# Patient Record
Sex: FEMALE | Race: WHITE | NOT HISPANIC OR LATINO | Employment: OTHER | URBAN - METROPOLITAN AREA
[De-identification: names, ages, dates, MRNs, and addresses within clinical notes are randomized per-mention and may not be internally consistent; named-entity substitution may affect disease eponyms.]

---

## 2017-01-05 ENCOUNTER — HOSPITAL ENCOUNTER (OUTPATIENT)
Dept: RADIOLOGY | Facility: MEDICAL CENTER | Age: 72
End: 2017-01-05
Attending: INTERNAL MEDICINE
Payer: MEDICARE

## 2017-01-05 ENCOUNTER — HOSPITAL ENCOUNTER (OUTPATIENT)
Dept: CARDIOLOGY | Facility: MEDICAL CENTER | Age: 72
End: 2017-01-05
Attending: INTERNAL MEDICINE | Admitting: INTERNAL MEDICINE
Payer: MEDICARE

## 2017-01-05 DIAGNOSIS — R00.2 PALPITATIONS: Chronic | ICD-10-CM

## 2017-01-05 DIAGNOSIS — R55 NEAR SYNCOPE: ICD-10-CM

## 2017-01-05 DIAGNOSIS — I25.10 CORONARY ARTERY DISEASE, NON-OCCLUSIVE: ICD-10-CM

## 2017-01-05 LAB
LV EJECT FRACT  99904: 70
LV EJECT FRACT MOD 2C 99903: 73.16
LV EJECT FRACT MOD 4C 99902: 66.24
LV EJECT FRACT MOD BP 99901: 61.92

## 2017-01-05 PROCEDURE — 93306 TTE W/DOPPLER COMPLETE: CPT

## 2017-01-05 PROCEDURE — A9502 TC99M TETROFOSMIN: HCPCS

## 2017-01-05 PROCEDURE — 93306 TTE W/DOPPLER COMPLETE: CPT | Mod: 26 | Performed by: INTERNAL MEDICINE

## 2017-01-09 ENCOUNTER — TELEPHONE (OUTPATIENT)
Dept: CARDIOLOGY | Facility: MEDICAL CENTER | Age: 72
End: 2017-01-09

## 2017-01-09 NOTE — TELEPHONE ENCOUNTER
----- Message from Grady Oneill M.D. sent at 1/9/2017  7:28 AM PST -----  Regarding: test results  Please tell the patient that both her echo and MPI look very good  Keep appt with EP

## 2017-02-15 ENCOUNTER — OFFICE VISIT (OUTPATIENT)
Dept: CARDIOLOGY | Facility: MEDICAL CENTER | Age: 72
End: 2017-02-15
Payer: MEDICARE

## 2017-02-15 VITALS
SYSTOLIC BLOOD PRESSURE: 132 MMHG | BODY MASS INDEX: 23.41 KG/M2 | HEIGHT: 61 IN | HEART RATE: 66 BPM | OXYGEN SATURATION: 96 % | WEIGHT: 124 LBS | DIASTOLIC BLOOD PRESSURE: 72 MMHG

## 2017-02-15 DIAGNOSIS — I47.10 PAROXYSMAL SUPRAVENTRICULAR TACHYCARDIA: Chronic | ICD-10-CM

## 2017-02-15 DIAGNOSIS — E78.00 HYPERCHOLESTEREMIA: Chronic | ICD-10-CM

## 2017-02-15 DIAGNOSIS — I25.10 CORONARY ARTERY DISEASE, NON-OCCLUSIVE: ICD-10-CM

## 2017-02-15 DIAGNOSIS — I47.19 PAT (PAROXYSMAL ATRIAL TACHYCARDIA): ICD-10-CM

## 2017-02-15 DIAGNOSIS — R00.2 PALPITATIONS: Chronic | ICD-10-CM

## 2017-02-15 PROCEDURE — 1101F PT FALLS ASSESS-DOCD LE1/YR: CPT | Mod: 8P | Performed by: INTERNAL MEDICINE

## 2017-02-15 PROCEDURE — G8420 CALC BMI NORM PARAMETERS: HCPCS | Performed by: INTERNAL MEDICINE

## 2017-02-15 PROCEDURE — G8432 DEP SCR NOT DOC, RNG: HCPCS | Performed by: INTERNAL MEDICINE

## 2017-02-15 PROCEDURE — 3014F SCREEN MAMMO DOC REV: CPT | Performed by: INTERNAL MEDICINE

## 2017-02-15 PROCEDURE — 3017F COLORECTAL CA SCREEN DOC REV: CPT | Mod: 8P | Performed by: INTERNAL MEDICINE

## 2017-02-15 PROCEDURE — G8484 FLU IMMUNIZE NO ADMIN: HCPCS | Performed by: INTERNAL MEDICINE

## 2017-02-15 PROCEDURE — G8598 ASA/ANTIPLAT THER USED: HCPCS | Performed by: INTERNAL MEDICINE

## 2017-02-15 PROCEDURE — 4040F PNEUMOC VAC/ADMIN/RCVD: CPT | Performed by: INTERNAL MEDICINE

## 2017-02-15 PROCEDURE — 1036F TOBACCO NON-USER: CPT | Performed by: INTERNAL MEDICINE

## 2017-02-15 PROCEDURE — 99214 OFFICE O/P EST MOD 30 MIN: CPT | Performed by: INTERNAL MEDICINE

## 2017-02-15 ASSESSMENT — ENCOUNTER SYMPTOMS
LOSS OF CONSCIOUSNESS: 0
MYALGIAS: 0
PND: 0
ORTHOPNEA: 0
DIZZINESS: 0

## 2017-02-15 NOTE — MR AVS SNAPSHOT
"        Shaniqua Solis   2/15/2017 4:00 PM   Office Visit   MRN: [id number]    Department:  [facility]   Dept Phone:  [phone]    Description:  Female : 1945   Provider:  Grady Oneill M.D.           Reason for Visit     Follow-Up           Allergies as of 2/15/2017     No Known Allergies      You were diagnosed with     Palpitations   [785.1.ICD-9-CM]       PAT (paroxysmal atrial tachycardia) (CMS-HCC)   [200004]       Paroxysmal supraventricular tachycardia (CMS-HCC)   [427.0.ICD-9-CM]       Coronary artery disease, non-occlusive   [919303]       Hypercholesteremia   [558081]         Vital Signs     Blood Pressure Pulse Height Weight Body Mass Index Oxygen Saturation    132/72 mmHg 66 1.549 m (5' 0.98\") 56.246 kg (124 lb) 23.44 kg/m2 96%    Smoking Status                   Never Smoker            Basic Information     Date Of Birth Sex Race Ethnicity Preferred Language    1945 Female White Non- English      Your appointments     Apr 10, 2017 12:40 PM   FOLLOW UP with Eugene Vela M.D.   [facility] (--)    [address], [facility] [address]  [facility] [address] [phone]              Problem List              ICD-10-CM Priority Class Noted - Resolved    CAD (coronary artery disease) (Chronic) I25.10 Medium  11/10/2010 - Present    Dizziness (Chronic) R42   2010 - Present    Hypercholesteremia (Chronic) E78.00 Medium  11/10/2010 - Present    Murmur (Chronic) R01.1 Medium  11/10/2010 - Present    Palpitations (Chronic) R00.2 Medium  11/10/2010 - Present    Paroxysmal supraventricular tachycardia (CMS-HCC) (Chronic) I47.1 Medium  11/10/2010 - Present    CHEST PAIN  High  2012 - Present    Cataract, right eye H26.9   2015 - Present    Coronary artery disease, non-occlusive I25.10   2016 - Present    Near syncope R55   2016 - Present    PAT (paroxysmal atrial tachycardia) (CMS-HCC) I47.1   2/15/2017 - Present      Health " Maintenance        Date Due Completion Dates    IMM DTaP/Tdap/Td Vaccine (1 - Tdap) 7/9/1964 ---    PAP SMEAR 7/9/1966 ---    COLONOSCOPY 7/9/1995 ---    IMM ZOSTER VACCINE 7/9/2005 ---    IMM INFLUENZA (1) 9/1/2016 ---    MAMMOGRAM 12/15/2016 12/15/2015, 12/10/2014, 11/25/2013, 11/25/2013, 11/25/2013, 10/13/2011, 8/20/2004    IMM PNEUMOCOCCAL 65+ (ADULT) LOW/MEDIUM RISK SERIES (2 of 2 - PPSV23) 9/6/2017 9/6/2016, 9/6/2016    BONE DENSITY 3/17/2021 3/17/2016            Current Immunizations     13-VALENT PCV PREVNAR 9/6/2016  1:19 PM    Pneumococcal polysaccharide vaccine (PPSV-23) 9/6/2016  1:17 PM      Below and/or attached are the medications your provider expects you to take. Review all of your home medications and newly ordered medications with your provider and/or pharmacist. Follow medication instructions as directed by your provider and/or pharmacist. Please keep your medication list with you and share with your provider. Update the information when medications are discontinued, doses are changed, or new medications (including over-the-counter products) are added; and carry medication information at all times in the event of emergency situations     Allergies:  No Known Allergies          Medications  Valid as of: February 15, 2017 -  4:37 PM    Generic Name Brand Name Tablet Size Instructions for use    Aspirin (Tab) aspirin 81 MG Take 81 mg by mouth every day.        Atorvastatin Calcium (Tab) LIPITOR 40 MG Take 1 Tab by mouth every day.        Calcium Carb-Cholecalciferol (Tab) oyster shell calcium/vitamin D 250-125 MG-UNIT Take 1 Tab by mouth every day.        Conj Estrog-Medroxyprogest Ace (Tab) PREMPRO 0.3-1.5 MG Take 1 Tab by mouth every day.        Metoprolol Succinate (TABLET SR 24 HR) TOPROL XL 25 MG Take 2 Tabs by mouth every day.        Simvastatin   Take  by mouth. UNKNOWN DOSE        .                 Medicines prescribed today were sent to:     [id number] [place] - [address]    [address]  [place] [address]    Phone: [phone] Fax: [phone]    Open 24 Hours?: No      Medication refill instructions:       If your prescription bottle indicates you have medication refills left, it is not necessary to call your provider’s office. Please contact your pharmacy and they will refill your medication.    If your prescription bottle indicates you do not have any refills left, you may request refills at any time through one of the following ways: The online Spacebar system (except Urgent Care), by calling your provider’s office, or by asking your pharmacy to contact your provider’s office with a refill request. Medication refills are processed only during regular business hours and may not be available until the next business day. Your provider may request additional information or to have a follow-up visit with you prior to refilling your medication.   *Please Note: Medication refills are assigned a new Rx number when refilled electronically. Your pharmacy may indicate that no refills were authorized even though a new prescription for the same medication is available at the pharmacy. Please request the medicine by name with the pharmacy before contacting your provider for a refill.        Referral     A referral request has been sent to our patient care coordination department. Please allow 3-5 business days for us to process this request and contact you either by phone or mail. If you do not hear from us by the 5th business day, please call us at [phone].           Spacebar Status: Patient Declined

## 2017-02-16 NOTE — PROGRESS NOTES
Subjective:   Shaniqua Solis is a 67 y.o. female who presents today CAD, palpitations nonsustained VT, PAT and PSVT.    Last seen 12/05/2016 by ERIC Brannon.    Was seen by Dr. Vela for symptomatic palpitations related to what he felt was PAT.  Metoprolol dose was doubled.  The patient also cut back on caffeine.  Nonetheless the patient continues to have daily sometimes frequent palpitations which are bothersome.  No syncope.  Noninvasive evaluation was normal.    Past medical history  Having also supple daily palpitations with some lightheadedness since September.  Holter monitor showed supraventricular tachycardia and one episode of nonsustained ventricular tachycardia.  No angina pectoris.  Has continued on metoprolol and simvastatin and aspirin.    Past Medical History   Diagnosis Date   • CAD (coronary artery disease) 11/10/2010   • Dizziness 2/2/2010   • Hypercholesteremia 11/10/2010   • Murmur 11/10/2010   • Palpitations 11/10/2010   • Paroxysmal supraventricular tachycardia (CMS-HCC) 11/10/2010   • Unspecified cataract    • Heart burn    • High cholesterol      Past Surgical History   Procedure Laterality Date   • Primary c section     • Pr anesth,repair lo abd hernia nos     • [facility] facelift:neck/midface     • Cataract phaco with iol Right 6/22/2015     Procedure: CATARACT PHACO WITH IOL;  Surgeon: David Llanos M.D.;  Location: SURGERY SUR[facility];  Service:      History   Smoking status   • Never Smoker    Smokeless tobacco   • Never Used     No Known Allergies  Outpatient Encounter Prescriptions as of 2/15/2017   Medication Sig Dispense Refill   • SIMVASTATIN PO Take  by mouth. UNKNOWN DOSE     • metoprolol SR (TOPROL XL) 25 MG TABLET SR 24 HR Take 2 Tabs by mouth every day. 180 Tab 2   • atorvastatin (LIPITOR) 40 MG Tab Take 1 Tab by mouth every day. 90 Tab 3   • oyster shell calcium/vitamin D 250-125 MG-UNIT TABS tablet Take 1 Tab by mouth every day.     • aspirin 81 MG tablet Take 81 mg  "by mouth every day.     • estrogen, conjugated,-medroxyprogesterone (PREMPRO) 0.3-1.5 MG per tablet Take 1 Tab by mouth every day.       No facility-administered encounter medications on file as of 2/15/2017.     Review of Systems   Cardiovascular: Negative for orthopnea, leg swelling and PND.   Musculoskeletal: Negative for myalgias.   Neurological: Negative for dizziness and loss of consciousness.        Objective:   /72 mmHg  Pulse 66  Ht 1.549 m (5' 0.98\")  Wt 56.246 kg (124 lb)  BMI 23.44 kg/m2  SpO2 96%    Physical Exam   Constitutional: She is oriented to person, place, and time. She appears well-nourished. No distress.   HENT:   Head: Normocephalic and atraumatic.   Mouth/Throat: Mucous membranes are normal.   Eyes: Conjunctivae and EOM are normal.   Neck: No JVD present. Carotid bruit is not present.   Normal jugular venous pressure.   Cardiovascular: Normal rate, regular rhythm, S1 normal and S2 normal.  Exam reveals no gallop and no friction rub.    Murmur heard.   Systolic murmur is present with a grade of 1/6   Pulses:       Carotid pulses are 2+ on the right side, and 2+ on the left side.       Radial pulses are 2+ on the right side, and 2+ on the left side.        Femoral pulses are 2+ on the right side, and 2+ on the left side.       Posterior tibial pulses are 2+ on the right side, and 2+ on the left side.   No femoral bruits.   Pulmonary/Chest: Effort normal and breath sounds normal. She has no wheezes. She has no rhonchi. She has no rales.   Abdominal: Soft. Bowel sounds are normal. She exhibits no abdominal bruit, no pulsatile midline mass and no mass. There is no hepatosplenomegaly. There is no tenderness.   Musculoskeletal: She exhibits no edema.   Neurological: She is alert and oriented to person, place, and time. She has normal strength. Gait normal.   Skin: Skin is warm and dry. No cyanosis. Nails show no clubbing.   Psychiatric: She has a normal mood and affect. Her behavior is " normal.       Assessment:     1. Palpitations     2. Paroxysmal supraventricular tachycardia (CMS-HCC)     3. Coronary artery disease, non-occlusive     4. Hypercholesteremia       2/17/2012 MPI  1)   Normal exercise Cardiolite stress test with no evidence of  ischemia or infarction.   2)  Normal left ventricular systolic function with a calculated  ejection fraction of 86%.   3)  No chest pain reported, and no ischemic EKG changes seen.   4)  Average exercise tolerance.   5)  No prior nuclear stress test available for comparison.      04/04/2012 Lab: Cholesterol 178. Triglycerides 146. HDL 49. .    11/29/2010 Echo: EF 84%. Mild LVH. 1-2+ AR. 1+ MR. PSP 22-27 mmHg.    01/23/2003 CARDIAC CATHETERIZATION  EF 67%.  50-70% diagonal stenosis.  Treated medically.    11/02/2016 HOLTER MONITOR  Normal sinus rhythm.  Multiple episodes of SVT.  Nonsustained VT.    Medical Decision Making:  Today's Assessment / Status / Plan:     Palpitations. Recurrent.    PAT. Recurrent despite increased metoprolol dose.    Nonsustained ventricular tachycardia.    CAD: Nonobstructive. Asymptomatic. Continue current therapy.    Hypercholesterolemia: On atorvastatin.    Recommendations  Continue current therapy.  Refer back to EP.    Follow-up 6 months.

## 2017-06-22 DIAGNOSIS — Z01.810 PRE-OPERATIVE CARDIOVASCULAR EXAMINATION: ICD-10-CM

## 2017-06-22 LAB — EKG IMPRESSION: NORMAL

## 2017-06-22 PROCEDURE — 93005 ELECTROCARDIOGRAM TRACING: CPT

## 2017-06-22 PROCEDURE — 93010 ELECTROCARDIOGRAM REPORT: CPT | Performed by: INTERNAL MEDICINE

## 2017-07-06 ENCOUNTER — HOSPITAL ENCOUNTER (OUTPATIENT)
Facility: MEDICAL CENTER | Age: 72
End: 2017-07-06
Attending: ORTHOPAEDIC SURGERY | Admitting: ORTHOPAEDIC SURGERY
Payer: MEDICARE

## 2017-07-06 VITALS
HEART RATE: 62 BPM | DIASTOLIC BLOOD PRESSURE: 59 MMHG | RESPIRATION RATE: 16 BRPM | TEMPERATURE: 96.8 F | BODY MASS INDEX: 24.41 KG/M2 | HEIGHT: 60 IN | OXYGEN SATURATION: 90 % | SYSTOLIC BLOOD PRESSURE: 114 MMHG | WEIGHT: 124.34 LBS

## 2017-07-06 PROBLEM — S83.232A COMPLEX TEAR OF MEDIAL MENISCUS OF LEFT KNEE AS CURRENT INJURY: Status: ACTIVE | Noted: 2017-07-06

## 2017-07-06 PROCEDURE — 501654 HCHG TUBING, ARTHREX PATIENT REDEUCE: Performed by: ORTHOPAEDIC SURGERY

## 2017-07-06 PROCEDURE — 160048 HCHG OR STATISTICAL LEVEL 1-5: Performed by: ORTHOPAEDIC SURGERY

## 2017-07-06 PROCEDURE — 160047 HCHG PACU  - EA ADDL 30 MINS PHASE II: Performed by: ORTHOPAEDIC SURGERY

## 2017-07-06 PROCEDURE — 700105 HCHG RX REV CODE 258: Performed by: ORTHOPAEDIC SURGERY

## 2017-07-06 PROCEDURE — 502580 HCHG PACK, KNEE ARTHROSCOPY: Performed by: ORTHOPAEDIC SURGERY

## 2017-07-06 PROCEDURE — 700101 HCHG RX REV CODE 250

## 2017-07-06 PROCEDURE — 160046 HCHG PACU - 1ST 60 MINS PHASE II: Performed by: ORTHOPAEDIC SURGERY

## 2017-07-06 PROCEDURE — 160035 HCHG PACU - 1ST 60 MINS PHASE I: Performed by: ORTHOPAEDIC SURGERY

## 2017-07-06 PROCEDURE — 501838 HCHG SUTURE GENERAL: Performed by: ORTHOPAEDIC SURGERY

## 2017-07-06 PROCEDURE — 700111 HCHG RX REV CODE 636 W/ 250 OVERRIDE (IP)

## 2017-07-06 PROCEDURE — 160029 HCHG SURGERY MINUTES - 1ST 30 MINS LEVEL 4: Performed by: ORTHOPAEDIC SURGERY

## 2017-07-06 PROCEDURE — 160002 HCHG RECOVERY MINUTES (STAT): Performed by: ORTHOPAEDIC SURGERY

## 2017-07-06 PROCEDURE — 160041 HCHG SURGERY MINUTES - EA ADDL 1 MIN LEVEL 4: Performed by: ORTHOPAEDIC SURGERY

## 2017-07-06 PROCEDURE — 160009 HCHG ANES TIME/MIN: Performed by: ORTHOPAEDIC SURGERY

## 2017-07-06 PROCEDURE — 501336 HCHG SHAVER: Performed by: ORTHOPAEDIC SURGERY

## 2017-07-06 PROCEDURE — 160036 HCHG PACU - EA ADDL 30 MINS PHASE I: Performed by: ORTHOPAEDIC SURGERY

## 2017-07-06 PROCEDURE — 160025 RECOVERY II MINUTES (STATS): Performed by: ORTHOPAEDIC SURGERY

## 2017-07-06 PROCEDURE — 501655 HCHG TUBING, ARTHREX PUMP REDEUCE: Performed by: ORTHOPAEDIC SURGERY

## 2017-07-06 RX ORDER — HYDROCODONE BITARTRATE AND ACETAMINOPHEN 5; 325 MG/1; MG/1
1-2 TABLET ORAL EVERY 4 HOURS PRN
Qty: 40 TAB | Refills: 0 | Status: SHIPPED | OUTPATIENT
Start: 2017-07-06 | End: 2017-11-28

## 2017-07-06 RX ORDER — BUPIVACAINE HYDROCHLORIDE AND EPINEPHRINE 5; 5 MG/ML; UG/ML
INJECTION, SOLUTION EPIDURAL; INTRACAUDAL; PERINEURAL
Status: DISCONTINUED | OUTPATIENT
Start: 2017-07-06 | End: 2017-07-06 | Stop reason: HOSPADM

## 2017-07-06 RX ORDER — SODIUM CHLORIDE, SODIUM LACTATE, POTASSIUM CHLORIDE, CALCIUM CHLORIDE 600; 310; 30; 20 MG/100ML; MG/100ML; MG/100ML; MG/100ML
INJECTION, SOLUTION INTRAVENOUS
Status: DISCONTINUED | OUTPATIENT
Start: 2017-07-06 | End: 2017-07-06 | Stop reason: HOSPADM

## 2017-07-06 RX ADMIN — EPHEDRINE SULFATE 10 MG: 50 INJECTION INTRAMUSCULAR; INTRAVENOUS; SUBCUTANEOUS at 12:49

## 2017-07-06 RX ADMIN — SODIUM CHLORIDE, POTASSIUM CHLORIDE, SODIUM LACTATE AND CALCIUM CHLORIDE 1000 ML: 600; 310; 30; 20 INJECTION, SOLUTION INTRAVENOUS at 11:20

## 2017-07-06 ASSESSMENT — PAIN SCALES - GENERAL
PAINLEVEL_OUTOF10: 0
PAINLEVEL_OUTOF10: ASSUMED PAIN PRESENT
PAINLEVEL_OUTOF10: ASSUMED PAIN PRESENT
PAINLEVEL_OUTOF10: 7
PAINLEVEL_OUTOF10: ASSUMED PAIN PRESENT
PAINLEVEL_OUTOF10: 3
PAINLEVEL_OUTOF10: 3
PAINLEVEL_OUTOF10: ASSUMED PAIN PRESENT
PAINLEVEL_OUTOF10: 0

## 2017-07-06 NOTE — IP AVS SNAPSHOT
Home Care Instructions                                                                                                                Name:Shaniqua Solis  Medical Record Number:[id number]  CSN: [id number]    YOB: 1945   Age: 71 y.o.  Sex: female  HT:1.524 m (5') WT: 56.4 kg (124 lb 5.4 oz)          Admit Date: 7/6/2017     Discharge Date:   Today's Date: 7/6/2017  Attending Doctor:  Al Heredia M.D.                  Allergies:  Review of patient's allergies indicates no known allergies.                Discharge Instructions         ACTIVITY: Rest and take it easy for the first 24 hours.  A responsible adult is recommended to remain with you during that time.  It is normal to feel sleepy.  We encourage you to not do anything that requires balance, judgment or coordination.    MILD FLU-LIKE SYMPTOMS ARE NORMAL. YOU MAY EXPERIENCE GENERALIZED MUSCLE ACHES, THROAT IRRITATION, HEADACHE AND/OR SOME NAUSEA.    FOR 24 HOURS DO NOT:  Drive, operate machinery or run household appliances.  Drink beer or alcoholic beverages.   Make important decisions or sign legal documents.    SPECIAL INSTRUCTIONS: Ice 20 minutes on/20 minutes off.  Elevate surgical extremity above heart level. Weight bearing as tolerated to left lower extremity. Follow up appt as scheduled.  Keep dressings clean, on and dry for five days then may apply band-aids.    DIET: To avoid nausea, slowly advance diet as tolerated, avoiding spicy or greasy foods for the first day.  Add more substantial food to your diet according to your physician's instructions. INCREASE FLUIDS AND FIBER TO AVOID CONSTIPATION.    FOLLOW-UP APPOINTMENT:  A follow-up appointment should be arranged with your doctor in office as instructed; call to schedule.    You should CALL YOUR PHYSICIAN if you develop:  Fever greater than 101 degrees F.  Pain not relieved by medication, or persistent nausea or vomiting.  Excessive bleeding (blood soaking through dressing) or  unexpected drainage from the wound.  Extreme redness or swelling around the incision site, drainage of pus or foul smelling drainage.  Inability to urinate or empty your bladder within 8 hours.  Problems with breathing or chest pain.    You should call 911 if you develop problems with breathing or chest pain.  If you are unable to contact your doctor or surgical center, you should go to the nearest emergency room or urgent care center.  Dr Heredia's telephone #: [phone]    If any questions arise, call your doctor.  If your doctor is not available, please feel free to call the Surgical Center at [phone].  The Center is open Monday through Friday from 7AM to 7PM.  You can also call the HEALTH HOTLINE open 24 hours/day, 7 days/week and speak to a nurse at [phone], or toll free at [phone].    A registered nurse may call you a few days after your surgery to see how you are doing after your procedure.    MEDICATIONS: Resume taking daily medication.  Take prescribed pain medication with food.  If no medication is prescribed, you may take non-aspirin pain medication if needed.  PAIN MEDICATION CAN BE VERY CONSTIPATING.  Take a stool softener or laxative such as senokot, pericolace, or milk of magnesia if needed.    Prescription given for Norco.  Last pain medication given at _____.    If your physician has prescribed pain medication that includes Acetaminophen (Tylenol), do not take additional Acetaminophen (Tylenol) while taking the prescribed medication.    Depression / Suicide Risk    As you are discharged from this [facility] facility, it is important to learn how to keep safe from harming yourself.    Recognize the warning signs:  · Abrupt changes in personality, positive or negative- including increase in energy   · Giving away possessions  · Change in eating patterns- significant weight changes-  positive or negative  · Change in sleeping patterns- unable to sleep or sleeping all the  time   · Unwillingness or inability to communicate  · Depression  · Unusual sadness, discouragement and loneliness  · Talk of wanting to die  · Neglect of personal appearance   · Rebelliousness- reckless behavior  · Withdrawal from people/activities they love  · Confusion- inability to concentrate     If you or a loved one observes any of these behaviors or has concerns about self-harm, here's what you can do:  · Talk about it- your feelings and reasons for harming yourself  · Remove any means that you might use to hurt yourself (examples: pills, rope, extension cords, firearm)  · Get professional help from the community (Mental Health, Substance Abuse, psychological counseling)  · Do not be alone:Call your Safe Contact- someone whom you trust who will be there for you.  · Call your local CRISIS HOTLINE 905-3114 or 549-753-7554  · Call your local Children's Mobile Crisis Response Team [place] (215) 090-7385 or www.[employer] [facility] [employer]  · Call the toll free National Suicide Prevention Hotlines   · National Suicide Prevention Lifeline 564-204-XXCQ (9155)  · National Hope Line Network 800-SUICIDE (715-1331)       Medication List      START taking these medications        Instructions    Morning Afternoon Evening Bedtime    hydrocodone-acetaminophen 5-325 MG Tabs per tablet   Commonly known as:  NORCO        Take 1-2 Tabs by mouth every four hours as needed.   Dose:  1-2 Tab                          CONTINUE taking these medications        Instructions    Morning Afternoon Evening Bedtime    aspirin 81 MG tablet        Take 81 mg by mouth every day.   Dose:  81 mg                        metoprolol SR 25 MG Tb24   Commonly known as:  TOPROL XL        Take 2 Tabs by mouth every day.   Dose:  50 mg                        SIMVASTATIN PO        Take 40 mg by mouth every bedtime. UNKNOWN DOSE   Dose:  40 mg                             Where to Get Your Medications      You can get these medications from any pharmacy      Bring a paper prescription for each of these medications    - hydrocodone-acetaminophen 5-325 MG Tabs per tablet            Medication Information     Above and/or attached are the medications your physician expects you to take upon discharge. Review all of your home medications and newly ordered medications with your doctor and/or pharmacist. Follow medication instructions as directed by your doctor and/or pharmacist. Please keep your medication list with you and share with your physician. Update the information when medications are discontinued, doses are changed, or new medications (including over-the-counter products) are added; and carry medication information at all times in the event of emergency situations.        Resources     Quit Smoking / Tobacco Use:    I understand the use of any tobacco products increases my chance of suffering from future heart disease or stroke and could cause other illnesses which may shorten my life. Quitting the use of tobacco products is the single most important thing I can do to improve my health. For further information on smoking / tobacco cessation call a Toll Free Quit Line at 1-558.449.8924 (*National Cancer [place]) or 1-220.862.4885 (American Lung Association) or you can access the web based program at www.lungusa.org.    [place] Tobacco Users Help Line:  (105) 826-1438       Toll Free: 1-155.204.8529  Quit Tobacco Program [facility] (708)167-3344    Crisis Hotline:    [place] Crisis Hotline:  4-229-CRFFWJN or 1-931.250.2407    [place] Crisis Hotline:    1-255.824.6976 or 724-699-7600    Discharge Survey:   Thank you for choosing [facility]. We hope we did everything we could to make your hospital stay a pleasant one. You may be receiving a survey and we would appreciate your time and participation in answering the questions. Your input is very valuable to us in our efforts to improve our service to our patients and their families.             Signatures     My signature on this form indicates that:    1. I acknowledge receipt and understanding of these Home Care Instruction.  2. My questions regarding this information have been answered to my satisfaction.  3. I have formulated a plan with my discharge nurse to obtain my prescribed medications for home.    __________________________________      __________________________________                   Patient Signature                                 Guardian/Responsible Adult Signature      __________________________________                 __________       ________                       Nurse Signature                                               Date                 Time

## 2017-07-06 NOTE — IP AVS SNAPSHOT
7/6/2017    Shaniqua Solis  [address] [place] [address]    Dear Shaniqua:    [facility] wants to ensure your discharge home is safe and you or your loved ones have had all of your questions answered regarding your care after you leave the hospital.    Below is a list of resources and contact information should you have any questions regarding your hospital stay, follow-up instructions, or active medical symptoms.    Questions or Concerns Regarding… Contact   Medical Questions Related to Your Discharge  (7 days a week, 8am-5pm) Contact a Nurse Care Coordinator   [phone]   Medical Questions Not Related to Your Discharge  (24 hours a day / 7 days a week)  Contact the Nurse Health Line   [phone]    Medications or Discharge Instructions Refer to your discharge packet   or contact your [facility] Primary Care Provider   [phone]   Follow-up Appointment(s) Schedule your appointment via rumr: turn off the lights   or contact Scheduling [phone]   Billing Review your statement via rumr: turn off the lights  or contact Billing [phone]   Medical Records Review your records via rumr: turn off the lights   or contact Medical Records [phone]     You may receive a telephone call within two days of discharge. This call is to make certain you understand your discharge instructions and have the opportunity to have any questions answered. You can also easily access your medical information, test results and upcoming appointments via the rumr: turn off the lights free online health management tool. You can learn more and sign up at [employer]/rumr: turn off the lights. For assistance setting up your rumr: turn off the lights account, please call [phone].    Once again, we want to ensure your discharge home is safe and that you have a clear understanding of any next steps in your care. If you have any questions or concerns, please do not hesitate to contact us, we are here for you. Thank you for choosing [facility] for your healthcare needs.    Sincerely,    Your [facility] Healthcare Team

## 2017-07-06 NOTE — DISCHARGE INSTRUCTIONS
ACTIVITY: Rest and take it easy for the first 24 hours.  A responsible adult is recommended to remain with you during that time.  It is normal to feel sleepy.  We encourage you to not do anything that requires balance, judgment or coordination.    MILD FLU-LIKE SYMPTOMS ARE NORMAL. YOU MAY EXPERIENCE GENERALIZED MUSCLE ACHES, THROAT IRRITATION, HEADACHE AND/OR SOME NAUSEA.    FOR 24 HOURS DO NOT:  Drive, operate machinery or run household appliances.  Drink beer or alcoholic beverages.   Make important decisions or sign legal documents.    SPECIAL INSTRUCTIONS: Ice 20 minutes on/20 minutes off.  Elevate surgical extremity above heart level. Weight bearing as tolerated to left lower extremity. Follow up appt as scheduled.  Keep dressings clean, on and dry for five days then may apply band-aids.    DIET: To avoid nausea, slowly advance diet as tolerated, avoiding spicy or greasy foods for the first day.  Add more substantial food to your diet according to your physician's instructions. INCREASE FLUIDS AND FIBER TO AVOID CONSTIPATION.    FOLLOW-UP APPOINTMENT:  A follow-up appointment should be arranged with your doctor in office as instructed; call to schedule.    You should CALL YOUR PHYSICIAN if you develop:  Fever greater than 101 degrees F.  Pain not relieved by medication, or persistent nausea or vomiting.  Excessive bleeding (blood soaking through dressing) or unexpected drainage from the wound.  Extreme redness or swelling around the incision site, drainage of pus or foul smelling drainage.  Inability to urinate or empty your bladder within 8 hours.  Problems with breathing or chest pain.    You should call 911 if you develop problems with breathing or chest pain.  If you are unable to contact your doctor or surgical center, you should go to the nearest emergency room or urgent care center.  Dr Heredia's telephone #: [phone]    If any questions arise, call your doctor.  If your doctor is not available,  please feel free to call the Surgical Center at [phone].  The Center is open Monday through Friday from 7AM to 7PM.  You can also call the HEALTH HOTLINE open 24 hours/day, 7 days/week and speak to a nurse at [phone], or toll free at [phone].    A registered nurse may call you a few days after your surgery to see how you are doing after your procedure.    MEDICATIONS: Resume taking daily medication.  Take prescribed pain medication with food.  If no medication is prescribed, you may take non-aspirin pain medication if needed.  PAIN MEDICATION CAN BE VERY CONSTIPATING.  Take a stool softener or laxative such as senokot, pericolace, or milk of magnesia if needed.    Prescription given for Norco.  Last pain medication given at _____.    If your physician has prescribed pain medication that includes Acetaminophen (Tylenol), do not take additional Acetaminophen (Tylenol) while taking the prescribed medication.    Depression / Suicide Risk    As you are discharged from this [facility] facility, it is important to learn how to keep safe from harming yourself.    Recognize the warning signs:  · Abrupt changes in personality, positive or negative- including increase in energy   · Giving away possessions  · Change in eating patterns- significant weight changes-  positive or negative  · Change in sleeping patterns- unable to sleep or sleeping all the time   · Unwillingness or inability to communicate  · Depression  · Unusual sadness, discouragement and loneliness  · Talk of wanting to die  · Neglect of personal appearance   · Rebelliousness- reckless behavior  · Withdrawal from people/activities they love  · Confusion- inability to concentrate     If you or a loved one observes any of these behaviors or has concerns about self-harm, here's what you can do:  · Talk about it- your feelings and reasons for harming yourself  · Remove any means that you might use to hurt yourself (examples: pills, rope,  extension cords, firearm)  · Get professional help from the community (Mental Health, Substance Abuse, psychological counseling)  · Do not be alone:Call your Safe Contact- someone whom you trust who will be there for you.  · Call your local CRISIS HOTLINE 200-9691 or 975-193-7632  · Call your local Children's Mobile Crisis Response Team [place] (185) 402-6568 or www.[facility]  · Call the toll free National Suicide Prevention Hotlines   · National Suicide Prevention Lifeline 429-537-MIGP (9603)  · National Hope Line Network 800-SUICIDE (121-3505)

## 2017-07-06 NOTE — OR NURSING
"1238 To PACU from OR via gurney, side rails up x 2 for safety, lungs clear bilaterally, scds on patient and machine operational, dressing CDI to L knee with +2 L pedal pulse and pink warm toes. Pt hypotensive, RN placed in trendelenberg with IVF to gravity. Skin pink, warm and dry, +2 peripheral pulses x 4 with cap refill <3 seconds x 4. Pt does not arouse to voice or touch; breathing easy and unlabored with LMA in place.   1245 BP remains hypotensive. BLE elevated with gurney in trendelenbert and IVF remain to gravity.   1250 Ephedrine given IV as ordered after no improvement of BP with positioning and IVF. Will monitor for improvement. No changes with previously charted pulses and cap refill.   1255 BP improving; stable. Pt does not respond to voice or touch. Breathing remains easy and unlabored.   1310 Pt arousable and denies pain or nausea. Denies dizziness. HOB elevated to 15 degrees with BLE remain elevated above heart level. Eyes close quickly without stimulation.  1325 Pt arouses easily to voice. Denies pain or nausea; tolerating sips of water. Denies dizziness. Pt reports baseline BP as \"low but I don't know the numbers\". IVF to TKO and will continue to monitor BP.   1330 BLE lowered and HOB elevated to 30 degrees; will monitor patient and BP. Pt reports \"a little bit of pain\" to L knee but tolerable. Denies nausea.   1345 BP stable; pain rated as tolerable. Denies nausea. Remains awake without stimulation.   1400 Meets criteria for transfer to stage II.   1403 patient up to BR in stage 2  Patient settled in recliner chair post short ambulation from BR - pt dressed with assist by RN. No changes from previous assessment.   1440 D/Reinier to care of family post uneventful stay in PACU 2.    "

## 2017-07-07 NOTE — OP REPORT
DATE OF SERVICE: 7/6/17    PREOPERATIVE DIAGNOSIS: left knee medial meniscus tear.     POSTOPERATIVE DIAGNOSIS:left   knee medial meniscus tear.     PROCEDURE PERFORMED:  1. left knee arthroscopic partial medial meniscectomy.     SURGEON: Al Heredia MD    ASSISTANT: NINO Dykes    ANESTHESIA: LMA    ANESTHESIOLOGIST: Nghia    ANTIBIOTICS: Ancef    COMPLICATIONS: None.         INDICATIONS: The patient presents with left knee pain recalcitrant to conservative treatment. The patient has evidence of a medial meniscus tear. The patient was having a lot of mechanical type symptoms.  After further discussion, the patient elects to undergo a left knee scope, partial medial meniscectomy and repair as indicated.  Risks of surgery were discussed at length. All questions were answered. No guarantees were given.     PROCEDURE IN DETAIL: The patient was properly identified in the preoperative holding area, and the left knee was marked as the correct surgical site. The patient was then taken back to the operative room, and placed supine on the operating room table and underwent general anesthesia. The left lower extremity was sterilely prepped and draped in standard fashion. The limb was exsanguinated and the tourniquet was inflated. A standard anterolateral portal was created. The scope was placed up the into the suprapatellar pouch. The patella showed mild to moderate wear. The trochlear groove showed moderate wear. The medial and lateral gutters were visualized, and no loose bodies were noted. The medial compartment was entered. There was evidence of a medial meniscus tear. An anterior medial portal was created. The probe was used to identify the extent of the tear. This was a complex tear involving the posterior horn, so a combination of small straight basket and a 4.0 Aggressive shaver was used to contour this back to a stable smooth edge. There was evidence of mild chondromalacia to the medial compartment. The ACL  looked good. The lateral compartment was visualized. There were no lateral compartment chondromalacia changes and no lateral meniscus tear.The scope was placed back in the suprapatellar pouch and loose fragments were removed. Excess fluid was drained. The incision was closed with 3-0 nylon. A total of 30ml of marcaine was injected. Soft dressings were applied. The patient was extubated and transferred to the recovery room in stable condition.

## 2017-09-05 ENCOUNTER — APPOINTMENT (OUTPATIENT)
Dept: SOCIAL WORK | Facility: CLINIC | Age: 72
End: 2017-09-05
Payer: MEDICARE

## 2017-09-05 PROCEDURE — G0008 ADMIN INFLUENZA VIRUS VAC: HCPCS | Performed by: REGISTERED NURSE

## 2017-09-05 PROCEDURE — 90662 IIV NO PRSV INCREASED AG IM: CPT | Performed by: REGISTERED NURSE

## 2017-09-15 ENCOUNTER — TELEPHONE (OUTPATIENT)
Dept: CARDIOLOGY | Facility: MEDICAL CENTER | Age: 72
End: 2017-09-15

## 2017-09-15 NOTE — TELEPHONE ENCOUNTER
" Pt explains she has had an increase in palpitations, with occasional dizziness only lasting a few seconds during the palpitations episodes which last a few minutes. She as become very anxious about the episodes and worries she has a \"heart blockage\". Reassured pt that her stress test was normal 1/2017 when she was having the same symptoms and explained symptoms are from the electrical pathways in her heart. Per Dr. Oneill's last ov note from 2/2017, pt should go back to see EP for her symptoms.She agrees to go back to see Dr. Vela. Appt scheduled for this Tuesday 9/19. Asked about caffeine intake and she says she drinks about 5 cups a day with about half decaf half regular coffee. Advised pt to decrease caffeine intake as much as possible will help. Pt appreciated.   "

## 2017-09-15 NOTE — TELEPHONE ENCOUNTER
----- Message from Annette Arnold sent at 9/15/2017  8:44 AM PDT -----  Regarding: heart palpitations several times a day  Contact: [phone]  NICK/lanre    Pt calling to report experiencing heart palpitations every day sometimes several times every day.  Pt states she is very worried.  Please call pt at

## 2017-09-19 ENCOUNTER — OFFICE VISIT (OUTPATIENT)
Dept: CARDIOLOGY | Facility: MEDICAL CENTER | Age: 72
End: 2017-09-19
Payer: MEDICARE

## 2017-09-19 VITALS
OXYGEN SATURATION: 95 % | DIASTOLIC BLOOD PRESSURE: 86 MMHG | SYSTOLIC BLOOD PRESSURE: 142 MMHG | WEIGHT: 127 LBS | BODY MASS INDEX: 24.94 KG/M2 | HEART RATE: 64 BPM | HEIGHT: 60 IN

## 2017-09-19 DIAGNOSIS — I47.19 ATRIAL TACHYCARDIA: ICD-10-CM

## 2017-09-19 DIAGNOSIS — R00.2 PALPITATIONS: Primary | ICD-10-CM

## 2017-09-19 DIAGNOSIS — I25.10 CORONARY ARTERY DISEASE INVOLVING NATIVE CORONARY ARTERY OF NATIVE HEART WITHOUT ANGINA PECTORIS: Chronic | ICD-10-CM

## 2017-09-19 LAB — EKG IMPRESSION: NORMAL

## 2017-09-19 PROCEDURE — 99214 OFFICE O/P EST MOD 30 MIN: CPT | Performed by: INTERNAL MEDICINE

## 2017-09-19 PROCEDURE — 93000 ELECTROCARDIOGRAM COMPLETE: CPT | Performed by: INTERNAL MEDICINE

## 2017-09-19 RX ORDER — METOPROLOL SUCCINATE 25 MG/1
50 TABLET, EXTENDED RELEASE ORAL DAILY
Qty: 180 TAB | Refills: 2 | Status: SHIPPED | OUTPATIENT
Start: 2017-09-19 | End: 2018-06-21 | Stop reason: SDUPTHER

## 2017-09-19 RX ORDER — SIMVASTATIN 40 MG
40 TABLET ORAL
Qty: 30 TAB | Refills: 11 | Status: SHIPPED | OUTPATIENT
Start: 2017-09-19 | End: 2018-09-25 | Stop reason: SDUPTHER

## 2017-09-19 NOTE — PROGRESS NOTES
"Arrhythmia Clinic Note (Established patient)    DOS: 9/19/2017    Chief complaint/Reason for consult: F/u PACs    Interval History:  Patient is a 73 yo F with history of palpitations, found to have nonsustained runs of AT, small burden. She has been on 50 of Toprol. She says her symptoms are getting more frequent, perhaps 3-4 x a day she will have a 4-5 second episode. She is here for follow-up.    ROS (+ highlighted in red):  Constitutional: Fevers/chills/fatigue/weightloss  Respiratory: Shortness of breath/cough  Cardiovascular: Chest pain/palpitations/edema/orthopnea/syncope    Past Medical History:   Diagnosis Date   • CAD (coronary artery disease) 11/10/2010   • Hypercholesteremia 11/10/2010   • Murmur 11/10/2010   • Palpitations 11/10/2010   • Paroxysmal supraventricular tachycardia (CMS-HCC) 11/10/2010   • Dizziness 2/2/2010   • Anesthesia     \"couldn't open eye or talk\"   • Arrhythmia     PSVT   • Heart burn    • High cholesterol    • Indigestion    • Unspecified cataract     IOL OD       No Known Allergies    Current Outpatient Prescriptions   Medication Sig Dispense Refill   • Calcium-Magnesium-Vitamin D (CALCIUM 500 PO) Take  by mouth.     • simvastatin (ZOCOR) 40 MG Tab Take 1 Tab by mouth every bedtime. UNKNOWN DOSE 30 Tab 11   • metoprolol SR (TOPROL XL) 25 MG TABLET SR 24 HR Take 2 Tabs by mouth every day. 180 Tab 2   • aspirin 81 MG tablet Take 81 mg by mouth every day.     • hydrocodone-acetaminophen (NORCO) 5-325 MG Tab per tablet Take 1-2 Tabs by mouth every four hours as needed. 40 Tab 0     No current facility-administered medications for this visit.        Physical Exam:  Vitals:    09/19/17 0742   BP: 142/86   Pulse: 64   SpO2: 95%   Weight: 57.6 kg (127 lb)   Height: 1.524 m (5')     General appearance: NAD, conversant   Eyes: anicteric sclerae, moist conjunctivae; no lid-lag; PERRLA  HENT: Atraumatic; oropharynx clear with moist mucous membranes and no mucosal ulcerations; normal hard and " soft palate  Neck: Trachea midline; FROM, supple, no thyromegaly or lymphadenopathy  Lungs: CTA, with normal respiratory effort and no intercostal retractions  CV: RRR, no MRGs, no JVD  Abdomen: Soft, non-tender; no masses or HSM  Extremities: No peripheral edema or extremity lymphadenopathy  Skin: Normal temperature, turgor and texture; no rash, ulcers or subcutaneous nodules  Psych: Appropriate affect, alert and oriented to person, place and time    Data:  Labs reviewed    Prior echo/stress reviewed:  Normal echo, normal nuc    EKG interpreted by me:  Sinus    Impression/Plan:  1. Palpitations  EKG    metoprolol SR (TOPROL XL) 25 MG TABLET SR 24 HR   2. Coronary artery disease involving native coronary artery of native heart without angina pectoris     3. Atrial tachycardia       -I offered her reassurance that her PACs and runs of non-sustained AT were not dangerous  -I explained options to her including increasing BB or addition of AAD albeit with risk of pro-arrhythmia  -She says she feels reassured and will do a trial of increasing BB  -I encouraged her to cut back on caffeine  -I will refill her simvastatin  -Will RTC in 6 mo    Eugene Vlea MD

## 2017-10-17 ENCOUNTER — HOSPITAL ENCOUNTER (OUTPATIENT)
Dept: LAB | Facility: MEDICAL CENTER | Age: 72
End: 2017-10-17
Attending: FAMILY MEDICINE
Payer: MEDICARE

## 2017-10-17 LAB
ALBUMIN SERPL BCP-MCNC: 3.7 G/DL (ref 3.2–4.9)
ALBUMIN/GLOB SERPL: 1.2 G/DL
ALP SERPL-CCNC: 70 U/L (ref 30–99)
ALT SERPL-CCNC: 13 U/L (ref 2–50)
ANION GAP SERPL CALC-SCNC: 8 MMOL/L (ref 0–11.9)
APPEARANCE UR: ABNORMAL
AST SERPL-CCNC: 14 U/L (ref 12–45)
BACTERIA #/AREA URNS HPF: ABNORMAL /HPF
BASOPHILS # BLD AUTO: 1 % (ref 0–1.8)
BASOPHILS # BLD: 0.07 K/UL (ref 0–0.12)
BILIRUB SERPL-MCNC: 0.4 MG/DL (ref 0.1–1.5)
BILIRUB UR QL STRIP.AUTO: NEGATIVE
BUN SERPL-MCNC: 16 MG/DL (ref 8–22)
CALCIUM SERPL-MCNC: 9.3 MG/DL (ref 8.5–10.5)
CHLORIDE SERPL-SCNC: 106 MMOL/L (ref 96–112)
CHOLEST SERPL-MCNC: 191 MG/DL (ref 100–199)
CO2 SERPL-SCNC: 27 MMOL/L (ref 20–33)
COLOR UR: YELLOW
CREAT SERPL-MCNC: 0.64 MG/DL (ref 0.5–1.4)
CRP SERPL HS-MCNC: 0.16 MG/DL (ref 0–0.75)
EOSINOPHIL # BLD AUTO: 0.17 K/UL (ref 0–0.51)
EOSINOPHIL NFR BLD: 2.4 % (ref 0–6.9)
EPI CELLS #/AREA URNS HPF: ABNORMAL /HPF
ERYTHROCYTE [DISTWIDTH] IN BLOOD BY AUTOMATED COUNT: 46.3 FL (ref 35.9–50)
FSH SERPL-ACNC: 57.5 MIU/ML
GFR SERPL CREATININE-BSD FRML MDRD: >60 ML/MIN/1.73 M 2
GLOBULIN SER CALC-MCNC: 3 G/DL (ref 1.9–3.5)
GLUCOSE SERPL-MCNC: 90 MG/DL (ref 65–99)
GLUCOSE UR STRIP.AUTO-MCNC: NEGATIVE MG/DL
HCT VFR BLD AUTO: 41.3 % (ref 37–47)
HDLC SERPL-MCNC: 47 MG/DL
HGB BLD-MCNC: 13.4 G/DL (ref 12–16)
HYALINE CASTS #/AREA URNS LPF: ABNORMAL /LPF
IMM GRANULOCYTES # BLD AUTO: 0.01 K/UL (ref 0–0.11)
IMM GRANULOCYTES NFR BLD AUTO: 0.1 % (ref 0–0.9)
KETONES UR STRIP.AUTO-MCNC: NEGATIVE MG/DL
LDLC SERPL CALC-MCNC: 110 MG/DL
LEUKOCYTE ESTERASE UR QL STRIP.AUTO: ABNORMAL
LH SERPL-ACNC: 23 IU/L
LYMPHOCYTES # BLD AUTO: 2.52 K/UL (ref 1–4.8)
LYMPHOCYTES NFR BLD: 36.3 % (ref 22–41)
MCH RBC QN AUTO: 30.2 PG (ref 27–33)
MCHC RBC AUTO-ENTMCNC: 32.4 G/DL (ref 33.6–35)
MCV RBC AUTO: 93 FL (ref 81.4–97.8)
MICRO URNS: ABNORMAL
MONOCYTES # BLD AUTO: 0.51 K/UL (ref 0–0.85)
MONOCYTES NFR BLD AUTO: 7.3 % (ref 0–13.4)
NEUTROPHILS # BLD AUTO: 3.66 K/UL (ref 2–7.15)
NEUTROPHILS NFR BLD: 52.9 % (ref 44–72)
NITRITE UR QL STRIP.AUTO: NEGATIVE
NRBC # BLD AUTO: 0 K/UL
NRBC BLD AUTO-RTO: 0 /100 WBC
PH UR STRIP.AUTO: 6 [PH]
PLATELET # BLD AUTO: 295 K/UL (ref 164–446)
PMV BLD AUTO: 10.1 FL (ref 9–12.9)
POTASSIUM SERPL-SCNC: 3.8 MMOL/L (ref 3.6–5.5)
PROT SERPL-MCNC: 6.7 G/DL (ref 6–8.2)
PROT UR QL STRIP: NEGATIVE MG/DL
RBC # BLD AUTO: 4.44 M/UL (ref 4.2–5.4)
RBC # URNS HPF: ABNORMAL /HPF
RBC UR QL AUTO: NEGATIVE
RHEUMATOID FACT SER IA-ACNC: <10 IU/ML (ref 0–14)
SODIUM SERPL-SCNC: 141 MMOL/L (ref 135–145)
SP GR UR STRIP.AUTO: 1.02
T3FREE SERPL-MCNC: 3.56 PG/ML (ref 2.4–4.2)
T4 FREE SERPL-MCNC: 0.93 NG/DL (ref 0.53–1.43)
TRIGL SERPL-MCNC: 170 MG/DL (ref 0–149)
TSH SERPL DL<=0.005 MIU/L-ACNC: 1.42 UIU/ML (ref 0.3–3.7)
URATE SERPL-MCNC: 6.2 MG/DL (ref 1.9–8.2)
UROBILINOGEN UR STRIP.AUTO-MCNC: 0.2 MG/DL
WBC # BLD AUTO: 6.9 K/UL (ref 4.8–10.8)
WBC #/AREA URNS HPF: ABNORMAL /HPF

## 2017-10-17 PROCEDURE — 83002 ASSAY OF GONADOTROPIN (LH): CPT

## 2017-10-17 PROCEDURE — 86140 C-REACTIVE PROTEIN: CPT

## 2017-10-17 PROCEDURE — 84481 FREE ASSAY (FT-3): CPT

## 2017-10-17 PROCEDURE — 81001 URINALYSIS AUTO W/SCOPE: CPT

## 2017-10-17 PROCEDURE — 36415 COLL VENOUS BLD VENIPUNCTURE: CPT

## 2017-10-17 PROCEDURE — 86200 CCP ANTIBODY: CPT

## 2017-10-17 PROCEDURE — 84443 ASSAY THYROID STIM HORMONE: CPT

## 2017-10-17 PROCEDURE — 85025 COMPLETE CBC W/AUTO DIFF WBC: CPT

## 2017-10-17 PROCEDURE — 86225 DNA ANTIBODY NATIVE: CPT

## 2017-10-17 PROCEDURE — 84439 ASSAY OF FREE THYROXINE: CPT

## 2017-10-17 PROCEDURE — 83001 ASSAY OF GONADOTROPIN (FSH): CPT

## 2017-10-17 PROCEDURE — 86038 ANTINUCLEAR ANTIBODIES: CPT

## 2017-10-17 PROCEDURE — 86235 NUCLEAR ANTIGEN ANTIBODY: CPT

## 2017-10-17 PROCEDURE — 87086 URINE CULTURE/COLONY COUNT: CPT

## 2017-10-17 PROCEDURE — 86431 RHEUMATOID FACTOR QUANT: CPT

## 2017-10-17 PROCEDURE — 84550 ASSAY OF BLOOD/URIC ACID: CPT

## 2017-10-17 PROCEDURE — 80061 LIPID PANEL: CPT

## 2017-10-17 PROCEDURE — 80053 COMPREHEN METABOLIC PANEL: CPT

## 2017-10-19 LAB
CCP IGG SERPL-ACNC: 22 UNITS (ref 0–19)
NUCLEAR IGG SER QL IA: NORMAL

## 2017-10-20 LAB
BACTERIA UR CULT: NORMAL
SIGNIFICANT IND 70042: NORMAL
SOURCE SOURCE: NORMAL

## 2017-11-28 ENCOUNTER — HOSPITAL ENCOUNTER (EMERGENCY)
Facility: MEDICAL CENTER | Age: 72
End: 2017-11-28
Attending: EMERGENCY MEDICINE
Payer: MEDICARE

## 2017-11-28 ENCOUNTER — APPOINTMENT (OUTPATIENT)
Dept: RADIOLOGY | Facility: MEDICAL CENTER | Age: 72
End: 2017-11-28
Attending: EMERGENCY MEDICINE
Payer: MEDICARE

## 2017-11-28 VITALS
TEMPERATURE: 98.5 F | WEIGHT: 126.98 LBS | BODY MASS INDEX: 23.98 KG/M2 | OXYGEN SATURATION: 98 % | HEIGHT: 61 IN | SYSTOLIC BLOOD PRESSURE: 139 MMHG | DIASTOLIC BLOOD PRESSURE: 78 MMHG | HEART RATE: 85 BPM | RESPIRATION RATE: 16 BRPM

## 2017-11-28 DIAGNOSIS — K12.2 CELLULITIS OF MOUTH: ICD-10-CM

## 2017-11-28 DIAGNOSIS — R22.0 RIGHT FACIAL SWELLING: ICD-10-CM

## 2017-11-28 LAB
ALBUMIN SERPL BCP-MCNC: 3.8 G/DL (ref 3.2–4.9)
ALBUMIN/GLOB SERPL: 1.2 G/DL
ALP SERPL-CCNC: 70 U/L (ref 30–99)
ALT SERPL-CCNC: 19 U/L (ref 2–50)
ANION GAP SERPL CALC-SCNC: 7 MMOL/L (ref 0–11.9)
AST SERPL-CCNC: 20 U/L (ref 12–45)
BASOPHILS # BLD AUTO: 0.5 % (ref 0–1.8)
BASOPHILS # BLD: 0.05 K/UL (ref 0–0.12)
BILIRUB SERPL-MCNC: 1.6 MG/DL (ref 0.1–1.5)
BUN SERPL-MCNC: 9 MG/DL (ref 8–22)
CALCIUM SERPL-MCNC: 9.1 MG/DL (ref 8.4–10.2)
CHLORIDE SERPL-SCNC: 102 MMOL/L (ref 96–112)
CO2 SERPL-SCNC: 26 MMOL/L (ref 20–33)
CREAT SERPL-MCNC: 0.65 MG/DL (ref 0.5–1.4)
EOSINOPHIL # BLD AUTO: 0.05 K/UL (ref 0–0.51)
EOSINOPHIL NFR BLD: 0.5 % (ref 0–6.9)
ERYTHROCYTE [DISTWIDTH] IN BLOOD BY AUTOMATED COUNT: 42.5 FL (ref 35.9–50)
GFR SERPL CREATININE-BSD FRML MDRD: >60 ML/MIN/1.73 M 2
GLOBULIN SER CALC-MCNC: 3.1 G/DL (ref 1.9–3.5)
GLUCOSE SERPL-MCNC: 131 MG/DL (ref 65–99)
HCT VFR BLD AUTO: 40 % (ref 37–47)
HGB BLD-MCNC: 13.7 G/DL (ref 12–16)
IMM GRANULOCYTES # BLD AUTO: 0.03 K/UL (ref 0–0.11)
IMM GRANULOCYTES NFR BLD AUTO: 0.3 % (ref 0–0.9)
LACTATE BLD-SCNC: 1.06 MMOL/L (ref 0.5–2)
LYMPHOCYTES # BLD AUTO: 1.04 K/UL (ref 1–4.8)
LYMPHOCYTES NFR BLD: 10.5 % (ref 22–41)
MCH RBC QN AUTO: 31.4 PG (ref 27–33)
MCHC RBC AUTO-ENTMCNC: 34.3 G/DL (ref 33.6–35)
MCV RBC AUTO: 91.7 FL (ref 81.4–97.8)
MONOCYTES # BLD AUTO: 0.41 K/UL (ref 0–0.85)
MONOCYTES NFR BLD AUTO: 4.1 % (ref 0–13.4)
NEUTROPHILS # BLD AUTO: 8.3 K/UL (ref 2–7.15)
NEUTROPHILS NFR BLD: 84.1 % (ref 44–72)
NRBC # BLD AUTO: 0 K/UL
NRBC BLD AUTO-RTO: 0 /100 WBC
PLATELET # BLD AUTO: 221 K/UL (ref 164–446)
PMV BLD AUTO: 9.6 FL (ref 9–12.9)
POTASSIUM SERPL-SCNC: 4.1 MMOL/L (ref 3.6–5.5)
PROT SERPL-MCNC: 6.9 G/DL (ref 6–8.2)
RBC # BLD AUTO: 4.36 M/UL (ref 4.2–5.4)
SODIUM SERPL-SCNC: 135 MMOL/L (ref 135–145)
SPECIMEN DRAWN FROM PATIENT: NORMAL
WBC # BLD AUTO: 9.9 K/UL (ref 4.8–10.8)

## 2017-11-28 PROCEDURE — 80053 COMPREHEN METABOLIC PANEL: CPT

## 2017-11-28 PROCEDURE — 85025 COMPLETE CBC W/AUTO DIFF WBC: CPT

## 2017-11-28 PROCEDURE — 700117 HCHG RX CONTRAST REV CODE 255: Performed by: EMERGENCY MEDICINE

## 2017-11-28 PROCEDURE — 99283 EMERGENCY DEPT VISIT LOW MDM: CPT

## 2017-11-28 PROCEDURE — 83605 ASSAY OF LACTIC ACID: CPT

## 2017-11-28 PROCEDURE — 70487 CT MAXILLOFACIAL W/DYE: CPT

## 2017-11-28 RX ORDER — NAPROXEN SODIUM 220 MG
220 TABLET ORAL PRN
Status: SHIPPED | COMMUNITY
End: 2018-11-24 | Stop reason: CLARIF

## 2017-11-28 RX ORDER — CLINDAMYCIN HYDROCHLORIDE 150 MG/1
300-600 CAPSULE ORAL SEE ADMIN INSTRUCTIONS
Status: SHIPPED | COMMUNITY
Start: 2017-11-27 | End: 2018-11-24

## 2017-11-28 RX ORDER — IBUPROFEN 200 MG
600 TABLET ORAL EVERY 8 HOURS PRN
Status: SHIPPED | COMMUNITY
End: 2018-12-28 | Stop reason: CLARIF

## 2017-11-28 RX ADMIN — IOHEXOL 100 ML: 350 INJECTION, SOLUTION INTRAVENOUS at 14:00

## 2017-11-28 ASSESSMENT — PAIN SCALES - GENERAL: PAINLEVEL_OUTOF10: 5

## 2017-11-28 NOTE — ED PROVIDER NOTES
ED Provider Note    CHIEF COMPLAINT  Chief Complaint   Patient presents with   • Oral Swelling   • Tooth Abscess       HPI  Shaniqua Solis is a 72 y.o. female who presents complaining of Right-sided facial swelling and dental pain.    Patient states she developed pain in right upper tooth on Sunday [facility] [place]. She states the pain increased overnight and she saw her dentist yesterday who prescribed clindamycin. She states her facial swelling was significant this morning she came to the ER for evaluation. Patient denies fever, chills, difficulty breathing or swallowing, trauma. Patient has no history of diabetes.      ALLERGIES  No Known Allergies    CURRENT MEDICATIONS  Home Medications     Reviewed by Tony Lyon (Pharmacy Tech) on 11/28/17 at 1259  Med List Status: Complete   Medication Last Dose Status   aspirin 81 MG tablet 11/26/2017 Active   Calcium-Magnesium-Vitamin D (CALCIUM 500 PO) > 2 days Active   clindamycin (CLEOCIN) 150 MG Cap 11/28/2017 Active   ibuprofen (MOTRIN) 200 MG Tab 11/27/2017 Active   metoprolol SR (TOPROL XL) 25 MG TABLET SR 24 HR 11/26/2017 Active   naproxen (ALEVE) 220 MG tablet 11/28/2017 Active   simvastatin (ZOCOR) 40 MG Tab 11/26/2017 Active                PAST MEDICAL HISTORY   has a past medical history of Anesthesia; Arrhythmia; CAD (coronary artery disease) (11/10/2010); Dizziness (2/2/2010); Heart burn; High cholesterol; Hypercholesteremia (11/10/2010); Indigestion; Murmur (11/10/2010); Palpitations (11/10/2010); Paroxysmal supraventricular tachycardia (CMS-HCC) (11/10/2010); and Unspecified cataract.    SURGICAL HISTORY   has a past surgical history that includes primary c section (1977, 1985); inguinal hernia repair (Right, 2014); abdominoplasty (2015); cataract phaco with iol (Right, 6/22/2015); carpal tunnel release (2012); salpingostomy (Bilateral, 1975); knee arthroscopy (Left, 7/6/2017); and medial meniscectomy (Left,  "7/6/2017).    SOCIAL HISTORY  Social History     Social History Main Topics   • Smoking status: Never Smoker   • Smokeless tobacco: Never Used   • Alcohol use Yes      Comment: 2 per week   • Drug use: No   • Sexual activity: Not on file     Lives here in [place]    REVIEW OF SYSTEMS  All other systems are negative  See HPI for further details.       PHYSICAL EXAM  VITAL SIGNS: /74   Pulse 81   Temp 36.9 °C (98.5 °F)   Resp 18   Ht 1.549 m (5' 1\")   Wt 57.6 kg (126 lb 15.8 oz)   SpO2 98%   BMI 23.99 kg/m²     General:  WDWN, nontoxic appearing in NAD; A+Ox3; V/S as above; afebrile  Skin: warm and dry; good color; no rash  HEENT: NC; moderate facial swelling without erythema to the right side; No obvious abscess noted intraorally; no trismus, no drooling, no subungual lesions; EOMs intact; PERRL; no scleral icterus   Neck: FROM; no meningismus, no LAD  Extremities: ENCISO x 4; no e/o trauma; no pedal edema  Neurologic: CNs III-XII grossly intact; speech clear; distal sensation intact; strength 5/5 UE/LEs; gait steady  Psychiatric: Appropriate affect, normal mood    LABS  Results for orders placed or performed during the hospital encounter of 11/28/17   CBC WITH DIFFERENTIAL   Result Value Ref Range    WBC 9.9 4.8 - 10.8 K/uL    RBC 4.36 4.20 - 5.40 M/uL    Hemoglobin 13.7 12.0 - 16.0 g/dL    Hematocrit 40.0 37.0 - 47.0 %    MCV 91.7 81.4 - 97.8 fL    MCH 31.4 27.0 - 33.0 pg    MCHC 34.3 33.6 - 35.0 g/dL    RDW 42.5 35.9 - 50.0 fL    Platelet Count 221 164 - 446 K/uL    MPV 9.6 9.0 - 12.9 fL    Neutrophils-Polys 84.10 (H) 44.00 - 72.00 %    Lymphocytes 10.50 (L) 22.00 - 41.00 %    Monocytes 4.10 0.00 - 13.40 %    Eosinophils 0.50 0.00 - 6.90 %    Basophils 0.50 0.00 - 1.80 %    Immature Granulocytes 0.30 0.00 - 0.90 %    Nucleated RBC 0.00 /100 WBC    Neutrophils (Absolute) 8.30 (H) 2.00 - 7.15 K/uL    Lymphs (Absolute) 1.04 1.00 - 4.80 K/uL    Monos (Absolute) 0.41 0.00 - 0.85 K/uL    Eos (Absolute) 0.05 0.00 " - 0.51 K/uL    Baso (Absolute) 0.05 0.00 - 0.12 K/uL    Immature Granulocytes (abs) 0.03 0.00 - 0.11 K/uL    NRBC (Absolute) 0.00 K/uL   COMP METABOLIC PANEL   Result Value Ref Range    Sodium 135 135 - 145 mmol/L    Potassium 4.1 3.6 - 5.5 mmol/L    Chloride 102 96 - 112 mmol/L    Co2 26 20 - 33 mmol/L    Anion Gap 7.0 0.0 - 11.9    Glucose 131 (H) 65 - 99 mg/dL    Bun 9 8 - 22 mg/dL    Creatinine 0.65 0.50 - 1.40 mg/dL    Calcium 9.1 8.4 - 10.2 mg/dL    AST(SGOT) 20 12 - 45 U/L    ALT(SGPT) 19 2 - 50 U/L    Alkaline Phosphatase 70 30 - 99 U/L    Total Bilirubin 1.6 (H) 0.1 - 1.5 mg/dL    Albumin 3.8 3.2 - 4.9 g/dL    Total Protein 6.9 6.0 - 8.2 g/dL    Globulin 3.1 1.9 - 3.5 g/dL    A-G Ratio 1.2 g/dL   LACTIC ACID   Result Value Ref Range    Lactic Acid 1.06 0.50 - 2.00 mmol/L    Specimen Venous    ESTIMATED GFR   Result Value Ref Range    GFR If African American >60 >60 mL/min/1.73 m 2    GFR If Non African American >60 >60 mL/min/1.73 m 2         IMAGING  CT-MAXILLOFACIAL WITH PLUS RECONS   Final Result      1.  Cellulitis involving the right premaxillary and premandibular regions. No evidence of focal abscess.      2.  No evidence of bony destructive change. There is extensive metallic streak artifact from dental hardware which does obscure the teeth and adjacent osseous structures.          MEDICAL RECORD  I have reviewed the patient's medical record and pertinent results as listed.      COURSE & MEDICAL DECISION MAKING  I have reviewed any medical record information, laboratory studies and radiographic results as noted.    Shaniqua Solis is a 72 y.o. female who presents complaining of Right facial swelling and dental pain.    Patient has been on 12-18 hours of antibiotics. She had increased swelling and was told to come in for evaluation. Patient is afebrile without trismus, drooling, or airway compromise.    CT max face was obtained to evaluate for abscess which is not present. Patient has a normal white  blood cell count. I have advised her to continue with clindamycin at 150 mg every 6 hours and to go to [facility] should her symptoms worsen where there is an oral surgeon on call. Patient declines pain medications.      Pt's blood pressure was noted to be above 120/80 here in the ER.  Pt was informed and advised to follow up as an outpatient for recheck for possible dx/management of hypertension.      FINAL IMPRESSION  1. Right facial swelling    2. Cellulitis of mouth        Electronically signed by: Shanda Grace, 11/28/2017 12:45 PM

## 2017-11-28 NOTE — ED NOTES
Assumed care of pt in lugo bed. Orders noted. Pt in no apparent distress, reading in chair. Awaits ct

## 2017-11-28 NOTE — DISCHARGE INSTRUCTIONS
Continue with clindamycin--take 150 mg every 6 hours  Follow-up with your dentist or primary care provider for recheck tomorrow  Go to [facility] for increased facial swelling, increased pain, difficulty opening her mouth or swallowing, and fever        Cellulitis  Cellulitis is an infection of the skin and the tissue beneath it. The infected area is usually red and tender. Cellulitis occurs most often in the arms and lower legs.   CAUSES   Cellulitis is caused by bacteria that enter the skin through cracks or cuts in the skin. The most common types of bacteria that cause cellulitis are staphylococci and streptococci.  SIGNS AND SYMPTOMS   · Redness and warmth.  · Swelling.  · Tenderness or pain.  · Fever.  DIAGNOSIS   Your health care provider can usually determine what is wrong based on a physical exam. Blood tests may also be done.  TREATMENT   Treatment usually involves taking an antibiotic medicine.  HOME CARE INSTRUCTIONS   · Take your antibiotic medicine as directed by your health care provider. Finish the antibiotic even if you start to feel better.  · Keep the infected arm or leg elevated to reduce swelling.  · Apply a warm cloth to the affected area up to 4 times per day to relieve pain.  · Take medicines only as directed by your health care provider.  · Keep all follow-up visits as directed by your health care provider.  SEEK MEDICAL CARE IF:   · You notice red streaks coming from the infected area.  · Your red area gets larger or turns dark in color.  · Your bone or joint underneath the infected area becomes painful after the skin has healed.  · Your infection returns in the same area or another area.  · You notice a swollen bump in the infected area.  · You develop new symptoms.  · You have a fever.  SEEK IMMEDIATE MEDICAL CARE IF:   · You feel very sleepy.  · You develop vomiting or diarrhea.  · You have a general ill feeling (malaise) with muscle aches and pains.  MAKE SURE YOU:   · Understand  these instructions.  · Will watch your condition.  · Will get help right away if you are not doing well or get worse.     This information is not intended to replace advice given to you by your health care provider. Make sure you discuss any questions you have with your health care provider.     Document Released: 09/27/2006 Document Revised: 01/08/2016 Document Reviewed: 03/04/2013  [employer] Patient Education ©2016 [employer]

## 2017-11-28 NOTE — ED NOTES
Dc instructions reviewed with pt. To f/u with pcp/deniest in am, return to [facility] for worsening s/s and continue with current antibx

## 2017-11-28 NOTE — ED NOTES
Right upper dental abscess - started clindamycin yesterday per dentist. Ibuprofen is helping decrease the pain.   Swelling is worse today. Was told to come into ER if eye is swollen - feeling tension on edge of right eye.

## 2018-04-09 DIAGNOSIS — R00.2 PALPITATIONS: ICD-10-CM

## 2018-04-09 DIAGNOSIS — E78.49 OTHER HYPERLIPIDEMIA: ICD-10-CM

## 2018-04-09 RX ORDER — METOPROLOL SUCCINATE 25 MG/1
50 TABLET, EXTENDED RELEASE ORAL DAILY
Qty: 180 TAB | Refills: 1 | OUTPATIENT
Start: 2018-04-09

## 2018-04-09 RX ORDER — SIMVASTATIN 40 MG
40 TABLET ORAL
Qty: 90 TAB | Refills: 1 | OUTPATIENT
Start: 2018-04-09

## 2018-05-11 ENCOUNTER — HOSPITAL ENCOUNTER (OUTPATIENT)
Dept: LAB | Facility: MEDICAL CENTER | Age: 73
End: 2018-05-11
Attending: NURSE PRACTITIONER
Payer: MEDICARE

## 2018-05-11 ENCOUNTER — HOSPITAL ENCOUNTER (OUTPATIENT)
Dept: LAB | Facility: MEDICAL CENTER | Age: 73
End: 2018-05-11
Attending: INTERNAL MEDICINE
Payer: MEDICARE

## 2018-05-11 LAB
ALBUMIN SERPL BCP-MCNC: 4 G/DL (ref 3.2–4.9)
ALBUMIN/GLOB SERPL: 1.5 G/DL
ALP SERPL-CCNC: 64 U/L (ref 30–99)
ALT SERPL-CCNC: 14 U/L (ref 2–50)
ANION GAP SERPL CALC-SCNC: 6 MMOL/L (ref 0–11.9)
AST SERPL-CCNC: 17 U/L (ref 12–45)
BASOPHILS # BLD AUTO: 1.1 % (ref 0–1.8)
BASOPHILS # BLD: 0.06 K/UL (ref 0–0.12)
BILIRUB SERPL-MCNC: 0.6 MG/DL (ref 0.1–1.5)
BUN SERPL-MCNC: 14 MG/DL (ref 8–22)
CALCIUM SERPL-MCNC: 9.7 MG/DL (ref 8.5–10.5)
CHLORIDE SERPL-SCNC: 105 MMOL/L (ref 96–112)
CHOLEST SERPL-MCNC: 183 MG/DL (ref 100–199)
CO2 SERPL-SCNC: 28 MMOL/L (ref 20–33)
CREAT SERPL-MCNC: 0.63 MG/DL (ref 0.5–1.4)
CRP SERPL HS-MCNC: 0.22 MG/DL (ref 0–0.75)
EOSINOPHIL # BLD AUTO: 0.12 K/UL (ref 0–0.51)
EOSINOPHIL NFR BLD: 2.2 % (ref 0–6.9)
ERYTHROCYTE [DISTWIDTH] IN BLOOD BY AUTOMATED COUNT: 43.8 FL (ref 35.9–50)
ERYTHROCYTE [SEDIMENTATION RATE] IN BLOOD BY WESTERGREN METHOD: 13 MM/HOUR (ref 0–30)
EST. AVERAGE GLUCOSE BLD GHB EST-MCNC: 114 MG/DL
GLOBULIN SER CALC-MCNC: 2.6 G/DL (ref 1.9–3.5)
GLUCOSE SERPL-MCNC: 92 MG/DL (ref 65–99)
HBA1C MFR BLD: 5.6 % (ref 0–5.6)
HCT VFR BLD AUTO: 39.3 % (ref 37–47)
HCV AB SER QL: NEGATIVE
HDLC SERPL-MCNC: 47 MG/DL
HGB BLD-MCNC: 13.4 G/DL (ref 12–16)
IMM GRANULOCYTES # BLD AUTO: 0.01 K/UL (ref 0–0.11)
IMM GRANULOCYTES NFR BLD AUTO: 0.2 % (ref 0–0.9)
LDLC SERPL CALC-MCNC: 109 MG/DL
LYMPHOCYTES # BLD AUTO: 1.64 K/UL (ref 1–4.8)
LYMPHOCYTES NFR BLD: 30.1 % (ref 22–41)
MCH RBC QN AUTO: 31.3 PG (ref 27–33)
MCHC RBC AUTO-ENTMCNC: 34.1 G/DL (ref 33.6–35)
MCV RBC AUTO: 91.8 FL (ref 81.4–97.8)
MONOCYTES # BLD AUTO: 0.38 K/UL (ref 0–0.85)
MONOCYTES NFR BLD AUTO: 7 % (ref 0–13.4)
NEUTROPHILS # BLD AUTO: 3.23 K/UL (ref 2–7.15)
NEUTROPHILS NFR BLD: 59.4 % (ref 44–72)
NRBC # BLD AUTO: 0 K/UL
NRBC BLD-RTO: 0 /100 WBC
PLATELET # BLD AUTO: 263 K/UL (ref 164–446)
PMV BLD AUTO: 10.1 FL (ref 9–12.9)
POTASSIUM SERPL-SCNC: 3.9 MMOL/L (ref 3.6–5.5)
PROT SERPL-MCNC: 6.6 G/DL (ref 6–8.2)
RBC # BLD AUTO: 4.28 M/UL (ref 4.2–5.4)
RHEUMATOID FACT SER IA-ACNC: <10 IU/ML (ref 0–14)
SODIUM SERPL-SCNC: 139 MMOL/L (ref 135–145)
T4 FREE SERPL-MCNC: 0.81 NG/DL (ref 0.53–1.43)
TRIGL SERPL-MCNC: 134 MG/DL (ref 0–149)
TSH SERPL DL<=0.005 MIU/L-ACNC: 0.77 UIU/ML (ref 0.38–5.33)
WBC # BLD AUTO: 5.4 K/UL (ref 4.8–10.8)

## 2018-05-11 PROCEDURE — 85652 RBC SED RATE AUTOMATED: CPT

## 2018-05-11 PROCEDURE — 80061 LIPID PANEL: CPT

## 2018-05-11 PROCEDURE — 86200 CCP ANTIBODY: CPT

## 2018-05-11 PROCEDURE — 80053 COMPREHEN METABOLIC PANEL: CPT

## 2018-05-11 PROCEDURE — 84443 ASSAY THYROID STIM HORMONE: CPT

## 2018-05-11 PROCEDURE — 86140 C-REACTIVE PROTEIN: CPT

## 2018-05-11 PROCEDURE — 36415 COLL VENOUS BLD VENIPUNCTURE: CPT

## 2018-05-11 PROCEDURE — 83036 HEMOGLOBIN GLYCOSYLATED A1C: CPT

## 2018-05-11 PROCEDURE — 86431 RHEUMATOID FACTOR QUANT: CPT

## 2018-05-11 PROCEDURE — 85025 COMPLETE CBC W/AUTO DIFF WBC: CPT

## 2018-05-11 PROCEDURE — 84439 ASSAY OF FREE THYROXINE: CPT

## 2018-05-11 PROCEDURE — 86803 HEPATITIS C AB TEST: CPT

## 2018-05-12 LAB — CCP IGG SERPL-ACNC: 29 UNITS (ref 0–19)

## 2018-05-14 ENCOUNTER — HOSPITAL ENCOUNTER (OUTPATIENT)
Facility: MEDICAL CENTER | Age: 73
End: 2018-05-14
Attending: NURSE PRACTITIONER
Payer: MEDICARE

## 2018-05-14 PROCEDURE — 82274 ASSAY TEST FOR BLOOD FECAL: CPT

## 2018-05-15 LAB — AMBIGUOUS DTTM AMBI4: NORMAL

## 2018-05-16 LAB — HEMOCCULT STL QL IA: NEGATIVE

## 2018-06-15 ENCOUNTER — HOSPITAL ENCOUNTER (OUTPATIENT)
Dept: RADIOLOGY | Facility: MEDICAL CENTER | Age: 73
End: 2018-06-15
Attending: NURSE PRACTITIONER
Payer: MEDICARE

## 2018-06-15 DIAGNOSIS — M85.80 OSTEOPENIA, UNSPECIFIED LOCATION: ICD-10-CM

## 2018-06-15 DIAGNOSIS — N95.1 POSTMENOPAUSAL DISORDER: ICD-10-CM

## 2018-06-15 DIAGNOSIS — Z12.31 VISIT FOR SCREENING MAMMOGRAM: ICD-10-CM

## 2018-06-15 PROCEDURE — 77080 DXA BONE DENSITY AXIAL: CPT

## 2018-06-15 PROCEDURE — 77067 SCR MAMMO BI INCL CAD: CPT

## 2018-06-20 ENCOUNTER — TELEPHONE (OUTPATIENT)
Dept: CARDIOLOGY | Facility: MEDICAL CENTER | Age: 73
End: 2018-06-20

## 2018-06-20 ENCOUNTER — OFFICE VISIT (OUTPATIENT)
Dept: CARDIOLOGY | Facility: MEDICAL CENTER | Age: 73
End: 2018-06-20
Payer: MEDICARE

## 2018-06-20 VITALS
HEIGHT: 60 IN | OXYGEN SATURATION: 92 % | SYSTOLIC BLOOD PRESSURE: 122 MMHG | WEIGHT: 124 LBS | BODY MASS INDEX: 24.35 KG/M2 | HEART RATE: 71 BPM | DIASTOLIC BLOOD PRESSURE: 70 MMHG

## 2018-06-20 DIAGNOSIS — I25.10 CORONARY ARTERY DISEASE INVOLVING NATIVE CORONARY ARTERY OF NATIVE HEART WITHOUT ANGINA PECTORIS: Chronic | ICD-10-CM

## 2018-06-20 DIAGNOSIS — E78.00 HYPERCHOLESTEREMIA: Chronic | ICD-10-CM

## 2018-06-20 DIAGNOSIS — I47.10 PSVT (PAROXYSMAL SUPRAVENTRICULAR TACHYCARDIA): ICD-10-CM

## 2018-06-20 PROCEDURE — 93000 ELECTROCARDIOGRAM COMPLETE: CPT | Performed by: INTERNAL MEDICINE

## 2018-06-20 PROCEDURE — 99214 OFFICE O/P EST MOD 30 MIN: CPT | Performed by: INTERNAL MEDICINE

## 2018-06-20 NOTE — PROGRESS NOTES
"Arrhythmia Clinic Note (Established patient)    DOS: 6/20/2018    Chief complaint/Reason for consult: F/u CAD    Interval History:  Patient is a 71 yo with history of non-obstructive CAD, atrial arrhythmias (no AF), who is here for follow-up. She is doing well. No complaints today. Cut way back on caffeine. Just got back from 2 weeks in [place].    ROS (+ highlighted in red):  Constitutional: Fevers/chills/fatigue/weightloss  Respiratory: Shortness of breath/cough  Cardiovascular: Chest pain/palpitations/edema/orthopnea/syncope    Past Medical History:   Diagnosis Date   • Anesthesia     \"couldn't open eye or talk\"   • Arrhythmia     PSVT   • CAD (coronary artery disease) 11/10/2010   • Dizziness 2/2/2010   • Heart burn    • High cholesterol    • Hypercholesteremia 11/10/2010   • Indigestion    • Murmur 11/10/2010   • Palpitations 11/10/2010   • Paroxysmal supraventricular tachycardia (HCC) 11/10/2010   • Unspecified cataract     IOL OD       No Known Allergies    Current Outpatient Prescriptions   Medication Sig Dispense Refill   • Calcium-Magnesium-Vitamin D (CALCIUM 500 PO) Take 2 Tabs by mouth every day.     • simvastatin (ZOCOR) 40 MG Tab Take 1 Tab by mouth every bedtime. UNKNOWN DOSE 30 Tab 11   • metoprolol SR (TOPROL XL) 25 MG TABLET SR 24 HR Take 2 Tabs by mouth every day. 180 Tab 2   • aspirin 81 MG tablet Take 81 mg by mouth every evening.     • ibuprofen (MOTRIN) 200 MG Tab Take 400 mg by mouth every 6 hours as needed for Mild Pain.     • clindamycin (CLEOCIN) 150 MG Cap Take 300-600 mg by mouth See Admin Instructions. Pt started on 11/27/2017, RX bottle reads take 4 capsule on the first day and 2 capsules every 6 hours until gone.  For her tooth infection     • naproxen (ALEVE) 220 MG tablet Take 220 mg by mouth PRN.       No current facility-administered medications for this visit.        Physical Exam:  Vitals:    06/20/18 1500   BP: 122/70   Pulse: 71   SpO2: 92%   Weight: 56.2 kg (124 lb)   Height: " 1.524 m (5')     General appearance: NAD, conversant   Eyes: anicteric sclerae, moist conjunctivae; no lid-lag; PERRLA  HENT: Atraumatic; oropharynx clear with moist mucous membranes and no mucosal ulcerations; normal hard and soft palate  Neck: Trachea midline; FROM, supple, no thyromegaly or lymphadenopathy  Lungs: CTA, with normal respiratory effort and no intercostal retractions  CV: RRR, no MRGs, no JVD  Abdomen: Soft, non-tender; no masses or HSM  Extremities: No peripheral edema or extremity lymphadenopathy  Skin: Normal temperature, turgor and texture; no rash, ulcers or subcutaneous nodules  Psych: Appropriate affect, alert and oriented to person, place and time    Data:  Labs reviewed\    Prior echo reviewed    EKG interpreted by me:  NSR    Impression/Plan:  1. PSVT (paroxysmal supraventricular tachycardia) (HCC)  EKG   2. Coronary artery disease involving native coronary artery of native heart without angina pectoris     3. Hypercholesteremia       -She is doing well  -On BB for her symptomatic PACs and palpitations  -On zocor and ASA for non-obstructive CAD  -I encouraged her to exercise more regularly  -F/u in 12 mo    Eugene Vela MD

## 2018-06-21 DIAGNOSIS — R00.2 PALPITATIONS: ICD-10-CM

## 2018-06-21 LAB — EKG IMPRESSION: NORMAL

## 2018-06-22 RX ORDER — METOPROLOL SUCCINATE 25 MG/1
50 TABLET, EXTENDED RELEASE ORAL DAILY
Qty: 180 TAB | Refills: 3 | Status: ON HOLD | OUTPATIENT
Start: 2018-06-22 | End: 2018-12-29

## 2018-06-26 ENCOUNTER — HOSPITAL ENCOUNTER (OUTPATIENT)
Dept: RADIOLOGY | Facility: MEDICAL CENTER | Age: 73
End: 2018-06-26
Attending: NURSE PRACTITIONER
Payer: MEDICARE

## 2018-06-26 DIAGNOSIS — M25.561 CHRONIC PAIN OF RIGHT KNEE: ICD-10-CM

## 2018-06-26 DIAGNOSIS — M79.641 RIGHT HAND PAIN: ICD-10-CM

## 2018-06-26 DIAGNOSIS — G89.29 CHRONIC PAIN OF RIGHT KNEE: ICD-10-CM

## 2018-06-26 PROCEDURE — 73562 X-RAY EXAM OF KNEE 3: CPT | Mod: RT

## 2018-06-26 PROCEDURE — 73130 X-RAY EXAM OF HAND: CPT | Mod: RT

## 2018-08-21 ENCOUNTER — APPOINTMENT (OUTPATIENT)
Dept: PHYSICAL THERAPY | Facility: REHABILITATION | Age: 73
End: 2018-08-21
Attending: NURSE PRACTITIONER
Payer: MEDICARE

## 2018-08-22 ENCOUNTER — OUTPATIENT INFUSION SERVICES (OUTPATIENT)
Dept: ONCOLOGY | Facility: MEDICAL CENTER | Age: 73
End: 2018-08-22
Attending: FAMILY MEDICINE
Payer: MEDICARE

## 2018-08-22 VITALS
BODY MASS INDEX: 25.1 KG/M2 | OXYGEN SATURATION: 95 % | TEMPERATURE: 97.9 F | RESPIRATION RATE: 18 BRPM | DIASTOLIC BLOOD PRESSURE: 76 MMHG | HEART RATE: 65 BPM | HEIGHT: 60 IN | SYSTOLIC BLOOD PRESSURE: 154 MMHG | WEIGHT: 127.87 LBS

## 2018-08-22 LAB
CA-I BLD ISE-SCNC: 1.11 MMOL/L (ref 1.1–1.3)
CREAT BLD-MCNC: 0.6 MG/DL (ref 0.5–1.4)

## 2018-08-22 PROCEDURE — 82330 ASSAY OF CALCIUM: CPT

## 2018-08-22 PROCEDURE — 96401 CHEMO ANTI-NEOPL SQ/IM: CPT

## 2018-08-22 PROCEDURE — 82565 ASSAY OF CREATININE: CPT

## 2018-08-22 PROCEDURE — 700111 HCHG RX REV CODE 636 W/ 250 OVERRIDE (IP): Mod: JG | Performed by: FAMILY MEDICINE

## 2018-08-22 RX ADMIN — DENOSUMAB 60 MG: 60 INJECTION SUBCUTANEOUS at 11:58

## 2018-08-22 ASSESSMENT — PAIN SCALES - GENERAL: PAINLEVEL_OUTOF10: 0

## 2018-08-22 NOTE — PROGRESS NOTES
Pt arrived to [facility], ambulatory, for prolia injection. Pt voices no complaints. Pt denies any recent or upcoming dental surgeries. Labs drawn and reviewed, pt within parameters to treat today. Prolia injected into the R-upper arm with no s/sx of adverse reaction. Pt left IS with no s/sx of distress. Follow up appointment confirmed.

## 2018-08-22 NOTE — PROGRESS NOTES
Pharmacy note  Cr = 0.6, CrCl ~ 76 ml/min  Ionized Ca = 1.11  OK for denosumab (Prolia) 60 mg SQ today    Last tx = none      Marisa Shaver, PharmD

## 2018-08-28 ENCOUNTER — APPOINTMENT (OUTPATIENT)
Dept: PHYSICAL THERAPY | Facility: REHABILITATION | Age: 73
End: 2018-08-28
Attending: NURSE PRACTITIONER
Payer: MEDICARE

## 2018-08-30 ENCOUNTER — APPOINTMENT (OUTPATIENT)
Dept: PHYSICAL THERAPY | Facility: REHABILITATION | Age: 73
End: 2018-08-30
Attending: NURSE PRACTITIONER
Payer: MEDICARE

## 2018-09-04 ENCOUNTER — APPOINTMENT (OUTPATIENT)
Dept: PHYSICAL THERAPY | Facility: REHABILITATION | Age: 73
End: 2018-09-04
Attending: NURSE PRACTITIONER
Payer: MEDICARE

## 2018-09-25 DIAGNOSIS — E78.00 HYPERCHOLESTEREMIA: Primary | ICD-10-CM

## 2018-09-25 RX ORDER — SIMVASTATIN 40 MG
TABLET ORAL
Qty: 30 TAB | Refills: 11 | Status: SHIPPED | OUTPATIENT
Start: 2018-09-25 | End: 2019-04-03 | Stop reason: SDUPTHER

## 2018-11-24 ENCOUNTER — APPOINTMENT (OUTPATIENT)
Dept: RADIOLOGY | Facility: MEDICAL CENTER | Age: 73
End: 2018-11-24
Attending: EMERGENCY MEDICINE
Payer: MEDICARE

## 2018-11-24 ENCOUNTER — HOSPITAL ENCOUNTER (EMERGENCY)
Facility: MEDICAL CENTER | Age: 73
End: 2018-11-24
Attending: EMERGENCY MEDICINE
Payer: MEDICARE

## 2018-11-24 VITALS
BODY MASS INDEX: 24.24 KG/M2 | TEMPERATURE: 97.5 F | SYSTOLIC BLOOD PRESSURE: 161 MMHG | WEIGHT: 123.46 LBS | RESPIRATION RATE: 20 BRPM | HEIGHT: 60 IN | HEART RATE: 54 BPM | OXYGEN SATURATION: 100 % | DIASTOLIC BLOOD PRESSURE: 88 MMHG

## 2018-11-24 DIAGNOSIS — N39.0 ACUTE UTI: ICD-10-CM

## 2018-11-24 LAB
ALBUMIN SERPL BCP-MCNC: 3.7 G/DL (ref 3.2–4.9)
ALBUMIN/GLOB SERPL: 1.3 G/DL
ALP SERPL-CCNC: 46 U/L (ref 30–99)
ALT SERPL-CCNC: 20 U/L (ref 2–50)
ANION GAP SERPL CALC-SCNC: 6 MMOL/L (ref 0–11.9)
APPEARANCE UR: CLEAR
AST SERPL-CCNC: 20 U/L (ref 12–45)
BACTERIA #/AREA URNS HPF: ABNORMAL /HPF
BASOPHILS # BLD AUTO: 0.8 % (ref 0–1.8)
BASOPHILS # BLD: 0.05 K/UL (ref 0–0.12)
BILIRUB SERPL-MCNC: 0.7 MG/DL (ref 0.1–1.5)
BILIRUB UR QL STRIP.AUTO: ABNORMAL
BUN SERPL-MCNC: 17 MG/DL (ref 8–22)
CALCIUM SERPL-MCNC: 9.4 MG/DL (ref 8.4–10.2)
CHLORIDE SERPL-SCNC: 105 MMOL/L (ref 96–112)
CO2 SERPL-SCNC: 27 MMOL/L (ref 20–33)
COLOR UR: ABNORMAL
CREAT SERPL-MCNC: 0.74 MG/DL (ref 0.5–1.4)
EOSINOPHIL # BLD AUTO: 0.15 K/UL (ref 0–0.51)
EOSINOPHIL NFR BLD: 2.5 % (ref 0–6.9)
EPI CELLS #/AREA URNS HPF: ABNORMAL /HPF
ERYTHROCYTE [DISTWIDTH] IN BLOOD BY AUTOMATED COUNT: 43.9 FL (ref 35.9–50)
GLOBULIN SER CALC-MCNC: 2.9 G/DL (ref 1.9–3.5)
GLUCOSE SERPL-MCNC: 102 MG/DL (ref 65–99)
GLUCOSE UR STRIP.AUTO-MCNC: NEGATIVE MG/DL
HCT VFR BLD AUTO: 40.2 % (ref 37–47)
HGB BLD-MCNC: 13.3 G/DL (ref 12–16)
IMM GRANULOCYTES # BLD AUTO: 0.01 K/UL (ref 0–0.11)
IMM GRANULOCYTES NFR BLD AUTO: 0.2 % (ref 0–0.9)
KETONES UR STRIP.AUTO-MCNC: ABNORMAL MG/DL
LEUKOCYTE ESTERASE UR QL STRIP.AUTO: ABNORMAL
LIPASE SERPL-CCNC: 31 U/L (ref 7–58)
LYMPHOCYTES # BLD AUTO: 1.61 K/UL (ref 1–4.8)
LYMPHOCYTES NFR BLD: 26.6 % (ref 22–41)
MCH RBC QN AUTO: 30.7 PG (ref 27–33)
MCHC RBC AUTO-ENTMCNC: 33.1 G/DL (ref 33.6–35)
MCV RBC AUTO: 92.8 FL (ref 81.4–97.8)
MICRO URNS: ABNORMAL
MONOCYTES # BLD AUTO: 0.51 K/UL (ref 0–0.85)
MONOCYTES NFR BLD AUTO: 8.4 % (ref 0–13.4)
MUCOUS THREADS #/AREA URNS HPF: ABNORMAL /HPF
NEUTROPHILS # BLD AUTO: 3.73 K/UL (ref 2–7.15)
NEUTROPHILS NFR BLD: 61.5 % (ref 44–72)
NITRITE UR QL STRIP.AUTO: NEGATIVE
NRBC # BLD AUTO: 0 K/UL
NRBC BLD-RTO: 0 /100 WBC
PH UR STRIP.AUTO: 5.5 [PH]
PLATELET # BLD AUTO: 224 K/UL (ref 164–446)
PMV BLD AUTO: 9.9 FL (ref 9–12.9)
POTASSIUM SERPL-SCNC: 4.1 MMOL/L (ref 3.6–5.5)
PROT SERPL-MCNC: 6.6 G/DL (ref 6–8.2)
PROT UR QL STRIP: NEGATIVE MG/DL
RBC # BLD AUTO: 4.33 M/UL (ref 4.2–5.4)
RBC # URNS HPF: ABNORMAL /HPF
RBC UR QL AUTO: ABNORMAL
SODIUM SERPL-SCNC: 138 MMOL/L (ref 135–145)
SP GR UR REFRACTOMETRY: 1.03
WBC # BLD AUTO: 6.1 K/UL (ref 4.8–10.8)
WBC #/AREA URNS HPF: ABNORMAL /HPF

## 2018-11-24 PROCEDURE — 87086 URINE CULTURE/COLONY COUNT: CPT

## 2018-11-24 PROCEDURE — 700117 HCHG RX CONTRAST REV CODE 255: Performed by: EMERGENCY MEDICINE

## 2018-11-24 PROCEDURE — 81001 URINALYSIS AUTO W/SCOPE: CPT

## 2018-11-24 PROCEDURE — 80053 COMPREHEN METABOLIC PANEL: CPT

## 2018-11-24 PROCEDURE — 74177 CT ABD & PELVIS W/CONTRAST: CPT

## 2018-11-24 PROCEDURE — 99284 EMERGENCY DEPT VISIT MOD MDM: CPT

## 2018-11-24 PROCEDURE — 87077 CULTURE AEROBIC IDENTIFY: CPT

## 2018-11-24 PROCEDURE — 36415 COLL VENOUS BLD VENIPUNCTURE: CPT

## 2018-11-24 PROCEDURE — 85025 COMPLETE CBC W/AUTO DIFF WBC: CPT

## 2018-11-24 PROCEDURE — 83690 ASSAY OF LIPASE: CPT

## 2018-11-24 RX ORDER — PHENAZOPYRIDINE HYDROCHLORIDE 200 MG/1
200 TABLET, FILM COATED ORAL 3 TIMES DAILY PRN
Qty: 6 TAB | Refills: 0 | Status: SHIPPED | OUTPATIENT
Start: 2018-11-24 | End: 2018-12-28 | Stop reason: CLARIF

## 2018-11-24 RX ORDER — CEPHALEXIN 500 MG/1
500 CAPSULE ORAL 4 TIMES DAILY
Qty: 12 CAP | Refills: 0 | Status: SHIPPED | OUTPATIENT
Start: 2018-11-24 | End: 2018-11-27

## 2018-11-24 RX ORDER — PANTOPRAZOLE SODIUM 40 MG/1
40 TABLET, DELAYED RELEASE ORAL DAILY
Status: SHIPPED | COMMUNITY
End: 2022-10-27

## 2018-11-24 RX ADMIN — IOHEXOL 100 ML: 350 INJECTION, SOLUTION INTRAVENOUS at 11:50

## 2018-11-24 ASSESSMENT — PAIN SCALES - GENERAL: PAINLEVEL_OUTOF10: 4

## 2018-11-24 ASSESSMENT — PAIN DESCRIPTION - DESCRIPTORS: DESCRIPTORS: ACHING

## 2018-11-24 NOTE — ED NOTES
Iv placed , labs drawn and taken to lab. poc update given to pt, results pending at this time  Urine collected  And taken to lab

## 2018-11-24 NOTE — ED TRIAGE NOTES
Pt C/O acute onset of left flank pain since this AM without N/V.  She also states feeling lightheaded for the past 2 days.  Denies any similar past occurrences.

## 2018-11-24 NOTE — ED PROVIDER NOTES
"ED Provider Note    CHIEF COMPLAINT  Chief Complaint   Patient presents with   • Flank Pain       [facility]  Shaniqua Solis is a 73 y.o. female who presents for evaluation of abdominal pain.  The patient points to her left lower quadrant as her site of pain.  She denies any flank pain associated with this episode.  She has had no nausea vomiting or diarrhea.  She denies fevers or chills.  She said no urinary symptoms.  When she awoke this morning she felt absolutely fine.  She states that after being awake for a while she had acute onset of left lower quadrant abdominal pain.  She states the pain seems to be quite a bit better at this point.  She does not believe she ever had any tenderness with this.  She is status post C-sections but is uncertain if she had had her appendix out or not.  She is never had pain like this before.  She thinks that she may have a lump in the left lower quadrant region.    REVIEW OF SYSTEMS  See HPI for further details. All other systems negative.    PAST MEDICAL HISTORY  Past Medical History:   Diagnosis Date   • Anesthesia     \"couldn't open eye or talk\"   • Arrhythmia     PSVT   • CAD (coronary artery disease) 11/10/2010   • Dizziness 2010   • Heart burn    • High cholesterol    • Hypercholesteremia 11/10/2010   • Indigestion    • Murmur 11/10/2010   • Palpitations 11/10/2010   • Paroxysmal supraventricular tachycardia (HCC) 11/10/2010   • Unspecified cataract     IOL OD       FAMILY HISTORY  Family History   Problem Relation Age of Onset   • Cancer Mother          at age 60; breast cancer   • Heart Attack Father          at age 83 of heart attack.   • Heart Attack Brother         heart attack at age 41 and bypass surgery   • Heart Attack Brother         bypass surgery at age 62   • Cancer Maternal Aunt        SOCIAL HISTORY  Social History     Social History   • Marital status:      Spouse name: N/A   • Number of children: N/A   • Years of education: N/A "     Occupational History   •  Other     Social History Main Topics   • Smoking status: Never Smoker   • Smokeless tobacco: Never Used   • Alcohol use Yes      Comment: Occasionally   • Drug use: No   • Sexual activity: Not on file     Other Topics Concern   • Not on file     Social History Narrative   • No narrative on file       SURGICAL HISTORY  Past Surgical History:   Procedure Laterality Date   • KNEE ARTHROSCOPY Left 7/6/2017    Procedure: KNEE ARTHROSCOPY;  Surgeon: Al Heredia M.D.;  Location: SURGERY [facility];  Service:    • MEDIAL MENISCECTOMY Left 7/6/2017    Procedure: MEDIAL MENISCECTOMY - PARTIAL, MUIR;  Surgeon: Al Heredia M.D.;  Location: SURGERY [facility];  Service:    • CATARACT PHACO WITH IOL Right 6/22/2015    Procedure: CATARACT PHACO WITH IOL;  Surgeon: David Llanos M.D.;  Location: SURGERY [facility];  Service:    • ABDOMINOPLASTY  2015    and face lift   • INGUINAL HERNIA REPAIR Right 2014   • CARPAL TUNNEL RELEASE  2012   • SALPINGOSTOMY Bilateral 1975    repair of tubes   • PRIMARY C SECTION  1977, 1985       CURRENT MEDICATIONS  Home Medications    **Home medications have not yet been reviewed for this encounter**         ALLERGIES  No Known Allergies    PHYSICAL EXAM  VITAL SIGNS: BP (!) 161/88   Pulse 66   Temp 36.4 °C (97.5 °F) (Temporal)   Resp 20   Ht 1.524 m (5')   Wt 56 kg (123 lb 7.3 oz)   SpO2 99%   BMI 24.11 kg/m²   Constitutional: Well developed, Well nourished, No acute distress, Non-toxic appearance.   HENT: Normocephalic, Atraumatic.  Eyes:  EOMI, Conjunctiva normal, No discharge.   Cardiovascular: Normal heart rate, Normal rhythm, No murmurs, No rubs, No gallops.   Thorax & Lungs: Clear to auscultation without wheezes, rales, or rhonchi.    Abdomen: Soft, nontender, no masses.  No evidence of hernia.  Skin: Warm, Dry.  Musculoskeletal: Good range of motion in all major joints.  Neurologic: Awake and alert.  No focal  deficits.    RADIOLOGY/PROCEDURES  CT-ABDOMEN-PELVIS WITH   Final Result      1.  There is no acute inflammatory process within the abdomen or pelvis.   2.  There is colonic diverticulosis.   3.  There is a hiatal hernia.            COURSE & MEDICAL DECISION MAKING  Pertinent Labs & Imaging studies reviewed. (See chart for details)  This is a 73-year-old here for evaluation of left lower quadrant abdominal pain.  She is afebrile.  Her physical exam is completely benign.  Laboratories are obtained to include chemistries which are normal to include liver function studies and lipase.  CBC shows normal white count and differential.  Urinalysis is positive for leukocyte esterase as well as blood.  Microscopic shows  WBCs 10-20 RBCs and few bacteria.  CT scan of the abdomen and pelvis shows no acute inflammatory process.  There is no evidence of renal stones or ureteral stones.  She has diverticulosis without evidence of diverticulitis and a hiatal hernia.  I discussed the results of all the studies with the patient.  At this point she is asymptomatic.  I explained to her that the only abnormality I found was her urine and I will treat her for urinary tract infection.  She will be placed on Keflex and Pyridium.  Her urine will be cultured.  I will have her follow-up with Dr. Lacey.  She should return to the emergency department for any fevers, uncontrolled pain, vomiting, or if she is not well.  She was concerned about the possibility of ovarian cancer based on her left lower quadrant abdominal pain.  I explained to her that I have not proved that she does not have ovarian cancer at this point but I see nothing to suggest that on the CT scan.  If she is concerned about that she should follow-up with her primary care provider.  She is given a discharge instruction sheet on urinary tract infections.    FINAL IMPRESSION  1.  Left lower quadrant abdominal pain  2.  Diverticulosis without evidence of diverticulitis  3.   Acute UTI         Electronically signed by: Bryan Rm, 11/24/2018 10:44 AM

## 2018-11-24 NOTE — ED NOTES
D/c pt home, 2 rx given . Pt aware of f/u instructions , aware to return for any changes or concerns. No further questions upon d/c home from ed

## 2018-11-26 LAB
BACTERIA UR CULT: ABNORMAL
BACTERIA UR CULT: ABNORMAL
SIGNIFICANT IND 70042: ABNORMAL
SITE SITE: ABNORMAL
SOURCE SOURCE: ABNORMAL

## 2018-11-27 NOTE — ED NOTES
ED Positive Culture Follow-up/Notification Note:    Date: 11/26/18     Patient seen in the ED on 11/24/2018 for    1. Acute UTI       Discharge Medication List as of 11/24/2018 12:46 PM      START taking these medications    Details   cephALEXin (KEFLEX) 500 MG Cap Take 1 Cap by mouth 4 times a day for 3 days., Disp-12 Cap, R-0, Print Rx Paper      phenazopyridine (PYRIDIUM) 200 MG Tab Take 1 Tab by mouth 3 times a day as needed., Disp-6 Tab, R-0, Print Rx Paper             Allergies: Patient has no known allergies.     Vitals:    11/24/18 1001 11/24/18 1003 11/24/18 1246   BP:  (!) 161/88    Pulse:  66 (!) 54   Resp:  20    Temp:  36.4 °C (97.5 °F)    TempSrc:  Temporal    SpO2:  99% 100%   Weight: 56 kg (123 lb 7.3 oz)     Height: 1.524 m (5')         Final cultures:   Results     Procedure Component Value Units Date/Time    URINE CULTURE(NEW) [[id number]]  (Abnormal) Collected:  11/24/18 1105    Order Status:  Completed Specimen:  Urine Updated:  11/26/18 0937     Significant Indicator POS (POS)     Source UR     Site --     Urine Culture -- (A)      Streptococcus agalactiae (Group B)  10-50,000 cfu/mL   (A)    URINALYSIS CULTURE, IF INDICATED [[id number]]  (Abnormal) Collected:  11/24/18 1105    Order Status:  Completed Specimen:  Urine Updated:  11/24/18 1144     Micro Urine Req Microscopic     Color Dark Yellow     Character Clear     Ph 5.5     Glucose Negative mg/dL      Ketones Trace (A) mg/dL      Protein Negative mg/dL      Bilirubin Small (A)     Nitrite Negative     Leukocyte Esterase Trace (A)     Occult Blood Trace (A)    URINALYSIS,CULTURE IF INDICATED [[id number]] Collected:  11/24/18 0000    Order Status:  Canceled Specimen:  Urine           Plan:   Appropriate antibiotic therapy prescribed. No changes required based upon culture result.      Mary Lou Ruiz

## 2018-12-28 ENCOUNTER — HOSPITAL ENCOUNTER (OUTPATIENT)
Facility: MEDICAL CENTER | Age: 73
End: 2018-12-29
Attending: EMERGENCY MEDICINE | Admitting: INTERNAL MEDICINE
Payer: MEDICARE

## 2018-12-28 ENCOUNTER — APPOINTMENT (OUTPATIENT)
Dept: CARDIOLOGY | Facility: MEDICAL CENTER | Age: 73
End: 2018-12-28
Attending: INTERNAL MEDICINE
Payer: MEDICARE

## 2018-12-28 ENCOUNTER — APPOINTMENT (OUTPATIENT)
Dept: RADIOLOGY | Facility: MEDICAL CENTER | Age: 73
End: 2018-12-28
Attending: EMERGENCY MEDICINE
Payer: MEDICARE

## 2018-12-28 DIAGNOSIS — R00.2 PALPITATIONS: ICD-10-CM

## 2018-12-28 DIAGNOSIS — R94.31 ABNORMAL EKG: ICD-10-CM

## 2018-12-28 DIAGNOSIS — R55 NEAR SYNCOPE: ICD-10-CM

## 2018-12-28 PROBLEM — K21.9 GERD (GASTROESOPHAGEAL REFLUX DISEASE): Status: ACTIVE | Noted: 2018-12-28

## 2018-12-28 PROBLEM — E87.6 HYPOKALEMIA: Status: ACTIVE | Noted: 2018-12-28

## 2018-12-28 LAB
ALBUMIN SERPL BCP-MCNC: 3.8 G/DL (ref 3.2–4.9)
ALBUMIN/GLOB SERPL: 1.4 G/DL
ALP SERPL-CCNC: 48 U/L (ref 30–99)
ALT SERPL-CCNC: 19 U/L (ref 2–50)
ANION GAP SERPL CALC-SCNC: 5 MMOL/L (ref 0–11.9)
APTT PPP: 29 SEC (ref 24.7–36)
AST SERPL-CCNC: 19 U/L (ref 12–45)
BASOPHILS # BLD AUTO: 1.2 % (ref 0–1.8)
BASOPHILS # BLD: 0.07 K/UL (ref 0–0.12)
BILIRUB SERPL-MCNC: 0.6 MG/DL (ref 0.1–1.5)
BUN SERPL-MCNC: 18 MG/DL (ref 8–22)
CALCIUM SERPL-MCNC: 9 MG/DL (ref 8.4–10.2)
CHLORIDE SERPL-SCNC: 105 MMOL/L (ref 96–112)
CO2 SERPL-SCNC: 27 MMOL/L (ref 20–33)
CORTIS SERPL-MCNC: 7.8 UG/DL (ref 0–23)
CREAT SERPL-MCNC: 0.65 MG/DL (ref 0.5–1.4)
EKG IMPRESSION: NORMAL
EOSINOPHIL # BLD AUTO: 0.17 K/UL (ref 0–0.51)
EOSINOPHIL NFR BLD: 2.8 % (ref 0–6.9)
ERYTHROCYTE [DISTWIDTH] IN BLOOD BY AUTOMATED COUNT: 43.2 FL (ref 35.9–50)
GLOBULIN SER CALC-MCNC: 2.7 G/DL (ref 1.9–3.5)
GLUCOSE SERPL-MCNC: 113 MG/DL (ref 65–99)
HCT VFR BLD AUTO: 40.6 % (ref 37–47)
HGB BLD-MCNC: 13.5 G/DL (ref 12–16)
IMM GRANULOCYTES # BLD AUTO: 0.01 K/UL (ref 0–0.11)
IMM GRANULOCYTES NFR BLD AUTO: 0.2 % (ref 0–0.9)
INR PPP: 1.01 (ref 0.87–1.13)
LV EJECT FRACT  99904: 70
LV EJECT FRACT MOD 4C 99902: 69.1
LYMPHOCYTES # BLD AUTO: 1.54 K/UL (ref 1–4.8)
LYMPHOCYTES NFR BLD: 25.5 % (ref 22–41)
MAGNESIUM SERPL-MCNC: 2 MG/DL (ref 1.5–2.5)
MCH RBC QN AUTO: 30.4 PG (ref 27–33)
MCHC RBC AUTO-ENTMCNC: 33.3 G/DL (ref 33.6–35)
MCV RBC AUTO: 91.4 FL (ref 81.4–97.8)
MONOCYTES # BLD AUTO: 0.49 K/UL (ref 0–0.85)
MONOCYTES NFR BLD AUTO: 8.1 % (ref 0–13.4)
NEUTROPHILS # BLD AUTO: 3.77 K/UL (ref 2–7.15)
NEUTROPHILS NFR BLD: 62.2 % (ref 44–72)
NRBC # BLD AUTO: 0 K/UL
NRBC BLD-RTO: 0 /100 WBC
PHOSPHATE SERPL-MCNC: 3 MG/DL (ref 2.5–4.5)
PLATELET # BLD AUTO: 242 K/UL (ref 164–446)
PMV BLD AUTO: 9.8 FL (ref 9–12.9)
POTASSIUM SERPL-SCNC: 3.5 MMOL/L (ref 3.6–5.5)
PROT SERPL-MCNC: 6.5 G/DL (ref 6–8.2)
PROTHROMBIN TIME: 13.2 SEC (ref 12–14.6)
RBC # BLD AUTO: 4.44 M/UL (ref 4.2–5.4)
SODIUM SERPL-SCNC: 137 MMOL/L (ref 135–145)
TROPONIN I SERPL-MCNC: <0.02 NG/ML (ref 0–0.04)
TSH SERPL DL<=0.005 MIU/L-ACNC: 0.97 UIU/ML (ref 0.38–5.33)
WBC # BLD AUTO: 6.1 K/UL (ref 4.8–10.8)

## 2018-12-28 PROCEDURE — 82533 TOTAL CORTISOL: CPT

## 2018-12-28 PROCEDURE — 80053 COMPREHEN METABOLIC PANEL: CPT

## 2018-12-28 PROCEDURE — 93005 ELECTROCARDIOGRAM TRACING: CPT

## 2018-12-28 PROCEDURE — 93005 ELECTROCARDIOGRAM TRACING: CPT | Performed by: EMERGENCY MEDICINE

## 2018-12-28 PROCEDURE — 85025 COMPLETE CBC W/AUTO DIFF WBC: CPT

## 2018-12-28 PROCEDURE — 700111 HCHG RX REV CODE 636 W/ 250 OVERRIDE (IP): Performed by: INTERNAL MEDICINE

## 2018-12-28 PROCEDURE — 93306 TTE W/DOPPLER COMPLETE: CPT

## 2018-12-28 PROCEDURE — 84100 ASSAY OF PHOSPHORUS: CPT

## 2018-12-28 PROCEDURE — 84443 ASSAY THYROID STIM HORMONE: CPT

## 2018-12-28 PROCEDURE — 700101 HCHG RX REV CODE 250: Performed by: INTERNAL MEDICINE

## 2018-12-28 PROCEDURE — 93306 TTE W/DOPPLER COMPLETE: CPT | Mod: 26 | Performed by: INTERNAL MEDICINE

## 2018-12-28 PROCEDURE — 94760 N-INVAS EAR/PLS OXIMETRY 1: CPT

## 2018-12-28 PROCEDURE — G0378 HOSPITAL OBSERVATION PER HR: HCPCS

## 2018-12-28 PROCEDURE — 99285 EMERGENCY DEPT VISIT HI MDM: CPT

## 2018-12-28 PROCEDURE — 36415 COLL VENOUS BLD VENIPUNCTURE: CPT

## 2018-12-28 PROCEDURE — 96372 THER/PROPH/DIAG INJ SC/IM: CPT

## 2018-12-28 PROCEDURE — 83735 ASSAY OF MAGNESIUM: CPT

## 2018-12-28 PROCEDURE — 85730 THROMBOPLASTIN TIME PARTIAL: CPT

## 2018-12-28 PROCEDURE — 99220 PR INITIAL OBSERVATION CARE,LEVL III: CPT | Performed by: INTERNAL MEDICINE

## 2018-12-28 PROCEDURE — 700102 HCHG RX REV CODE 250 W/ 637 OVERRIDE(OP): Performed by: INTERNAL MEDICINE

## 2018-12-28 PROCEDURE — 84484 ASSAY OF TROPONIN QUANT: CPT

## 2018-12-28 PROCEDURE — 71045 X-RAY EXAM CHEST 1 VIEW: CPT

## 2018-12-28 PROCEDURE — A9270 NON-COVERED ITEM OR SERVICE: HCPCS | Performed by: INTERNAL MEDICINE

## 2018-12-28 PROCEDURE — 85610 PROTHROMBIN TIME: CPT

## 2018-12-28 RX ORDER — POLYETHYLENE GLYCOL 3350 17 G/17G
1 POWDER, FOR SOLUTION ORAL
Status: DISCONTINUED | OUTPATIENT
Start: 2018-12-28 | End: 2018-12-29 | Stop reason: HOSPADM

## 2018-12-28 RX ORDER — ASPIRIN 81 MG/1
81 TABLET, CHEWABLE ORAL EVERY EVENING
Status: DISCONTINUED | OUTPATIENT
Start: 2018-12-28 | End: 2018-12-29 | Stop reason: HOSPADM

## 2018-12-28 RX ORDER — ENALAPRILAT 1.25 MG/ML
1.25 INJECTION INTRAVENOUS EVERY 6 HOURS PRN
Status: DISCONTINUED | OUTPATIENT
Start: 2018-12-28 | End: 2018-12-29 | Stop reason: HOSPADM

## 2018-12-28 RX ORDER — ACETAMINOPHEN 325 MG/1
650 TABLET ORAL EVERY 6 HOURS PRN
Status: DISCONTINUED | OUTPATIENT
Start: 2018-12-28 | End: 2018-12-29 | Stop reason: HOSPADM

## 2018-12-28 RX ORDER — METOPROLOL SUCCINATE 25 MG/1
50 TABLET, EXTENDED RELEASE ORAL EVERY EVENING
Status: DISCONTINUED | OUTPATIENT
Start: 2018-12-28 | End: 2018-12-29 | Stop reason: HOSPADM

## 2018-12-28 RX ORDER — OMEPRAZOLE 20 MG/1
20 CAPSULE, DELAYED RELEASE ORAL DAILY
Status: DISCONTINUED | OUTPATIENT
Start: 2018-12-29 | End: 2018-12-28

## 2018-12-28 RX ORDER — POTASSIUM CHLORIDE 20 MEQ/1
40 TABLET, EXTENDED RELEASE ORAL 2 TIMES DAILY
Status: COMPLETED | OUTPATIENT
Start: 2018-12-28 | End: 2018-12-29

## 2018-12-28 RX ORDER — BISACODYL 10 MG
10 SUPPOSITORY, RECTAL RECTAL
Status: DISCONTINUED | OUTPATIENT
Start: 2018-12-28 | End: 2018-12-29 | Stop reason: HOSPADM

## 2018-12-28 RX ORDER — AMOXICILLIN 250 MG
2 CAPSULE ORAL 2 TIMES DAILY
Status: DISCONTINUED | OUTPATIENT
Start: 2018-12-28 | End: 2018-12-29 | Stop reason: HOSPADM

## 2018-12-28 RX ORDER — PANTOPRAZOLE SODIUM 40 MG/1
40 TABLET, DELAYED RELEASE ORAL DAILY
Status: DISCONTINUED | OUTPATIENT
Start: 2018-12-28 | End: 2018-12-28

## 2018-12-28 RX ORDER — ONDANSETRON 4 MG/1
4 TABLET, ORALLY DISINTEGRATING ORAL EVERY 4 HOURS PRN
Status: DISCONTINUED | OUTPATIENT
Start: 2018-12-28 | End: 2018-12-29 | Stop reason: HOSPADM

## 2018-12-28 RX ORDER — SIMVASTATIN 20 MG
40 TABLET ORAL EVERY EVENING
Status: DISCONTINUED | OUTPATIENT
Start: 2018-12-28 | End: 2018-12-29 | Stop reason: HOSPADM

## 2018-12-28 RX ORDER — OMEPRAZOLE 20 MG/1
20 CAPSULE, DELAYED RELEASE ORAL DAILY
Status: DISCONTINUED | OUTPATIENT
Start: 2018-12-28 | End: 2018-12-29 | Stop reason: HOSPADM

## 2018-12-28 RX ORDER — SODIUM CHLORIDE AND POTASSIUM CHLORIDE 150; 900 MG/100ML; MG/100ML
INJECTION, SOLUTION INTRAVENOUS CONTINUOUS
Status: DISCONTINUED | OUTPATIENT
Start: 2018-12-28 | End: 2018-12-29 | Stop reason: HOSPADM

## 2018-12-28 RX ORDER — ONDANSETRON 2 MG/ML
4 INJECTION INTRAMUSCULAR; INTRAVENOUS EVERY 4 HOURS PRN
Status: DISCONTINUED | OUTPATIENT
Start: 2018-12-28 | End: 2018-12-29 | Stop reason: HOSPADM

## 2018-12-28 RX ADMIN — POTASSIUM CHLORIDE AND SODIUM CHLORIDE: 900; 150 INJECTION, SOLUTION INTRAVENOUS at 13:59

## 2018-12-28 RX ADMIN — POTASSIUM CHLORIDE 40 MEQ: 1500 TABLET, EXTENDED RELEASE ORAL at 18:26

## 2018-12-28 RX ADMIN — OMEPRAZOLE 20 MG: 20 CAPSULE, DELAYED RELEASE ORAL at 15:02

## 2018-12-28 RX ADMIN — ENOXAPARIN SODIUM 40 MG: 100 INJECTION SUBCUTANEOUS at 15:07

## 2018-12-28 RX ADMIN — METOPROLOL SUCCINATE 50 MG: 25 TABLET, EXTENDED RELEASE ORAL at 18:25

## 2018-12-28 RX ADMIN — SIMVASTATIN 40 MG: 20 TABLET, FILM COATED ORAL at 18:24

## 2018-12-28 ASSESSMENT — COGNITIVE AND FUNCTIONAL STATUS - GENERAL
SUGGESTED CMS G CODE MODIFIER MOBILITY: CH
DAILY ACTIVITIY SCORE: 24
MOBILITY SCORE: 24
SUGGESTED CMS G CODE MODIFIER DAILY ACTIVITY: CH

## 2018-12-28 ASSESSMENT — PATIENT HEALTH QUESTIONNAIRE - PHQ9
SUM OF ALL RESPONSES TO PHQ9 QUESTIONS 1 AND 2: 0
2. FEELING DOWN, DEPRESSED, IRRITABLE, OR HOPELESS: NOT AT ALL
1. LITTLE INTEREST OR PLEASURE IN DOING THINGS: NOT AT ALL

## 2018-12-28 ASSESSMENT — ENCOUNTER SYMPTOMS
SPUTUM PRODUCTION: 0
FALLS: 0
COUGH: 0
NAUSEA: 0
ABDOMINAL PAIN: 0
HEADACHES: 0
DEPRESSION: 0
CHILLS: 0
PALPITATIONS: 1
TINGLING: 0
FEVER: 0
MYALGIAS: 0
DIARRHEA: 0
WEAKNESS: 0
SHORTNESS OF BREATH: 0
LOSS OF CONSCIOUSNESS: 0
STRIDOR: 0
DIZZINESS: 1
CONSTIPATION: 0
VOMITING: 0

## 2018-12-28 ASSESSMENT — LIFESTYLE VARIABLES
ON A TYPICAL DAY WHEN YOU DRINK ALCOHOL HOW MANY DRINKS DO YOU HAVE: 1
HAVE YOU EVER FELT YOU SHOULD CUT DOWN ON YOUR DRINKING: NO
CONSUMPTION TOTAL: NEGATIVE
TOTAL SCORE: 0
EVER HAD A DRINK FIRST THING IN THE MORNING TO STEADY YOUR NERVES TO GET RID OF A HANGOVER: NO
ALCOHOL_USE: YES
HOW MANY TIMES IN THE PAST YEAR HAVE YOU HAD 5 OR MORE DRINKS IN A DAY: 0
EVER_SMOKED: NEVER
TOTAL SCORE: 0
EVER FELT BAD OR GUILTY ABOUT YOUR DRINKING: NO
HAVE PEOPLE ANNOYED YOU BY CRITICIZING YOUR DRINKING: NO
AVERAGE NUMBER OF DAYS PER WEEK YOU HAVE A DRINK CONTAINING ALCOHOL: 1
TOTAL SCORE: 0

## 2018-12-28 ASSESSMENT — PAIN SCALES - GENERAL
PAINLEVEL_OUTOF10: 0

## 2018-12-28 ASSESSMENT — PAIN SCALES - WONG BAKER: WONGBAKER_NUMERICALRESPONSE: DOESN'T HURT AT ALL

## 2018-12-28 NOTE — ED PROVIDER NOTES
"ED Provider Note    CHIEF COMPLAINT  Chief Complaint   Patient presents with   • Dizziness   • Palpitations       [facility]  Shaniqua Solis is a 73 y.o. female who ambulates to the emergency department through triage with a family member referred from primary care for abnormal EKG.  Patient describes approximately 10 days of recurrent palpitations.  Patient states symptoms started 10 days ago after attending a 49 or football game, when she returned to the hotel she had palpitations, racing heart, lasting for approximately 3 hours despite deep breathing exercises.  Patient states the palpitations have recurred intermittently since that time.  Patient also describes some dizziness, lightheaded, never spinning, lasting only seconds but some requiring her to hold onto something so as not to fall.  No true syncope.  No chest pain or discomfort.  No shortness of breath.  No extremity edema or swelling.  No recent illness, fever, cough or congestion.  No headache, visual changes, slurred speech, focal weakness or paresthesias.  No history for similar symptoms previously.    Patient states she is followed by [facility] for \"arrhythmia\" for which she takes metoprolol.  Normal stress testing 1-2 years ago.    REVIEW OF SYSTEMS  See HPI for further details. All other systems are negative.     PAST MEDICAL HISTORY   has a past medical history of Anesthesia; Arrhythmia; CAD (coronary artery disease) (11/10/2010); Dizziness (2/2/2010); Heart burn; High cholesterol; Hypercholesteremia (11/10/2010); Indigestion; Murmur (11/10/2010); Palpitations (11/10/2010); Paroxysmal supraventricular tachycardia (HCC) (11/10/2010); and Unspecified cataract.    SOCIAL HISTORY  Social History     Social History Main Topics   • Smoking status: Never Smoker   • Smokeless tobacco: Never Used   • Alcohol use Yes      Comment: Occasionally   • Drug use: No   • Sexual activity: Not on file       SURGICAL HISTORY   has a past surgical history " "that includes primary c section (1977, 1985); inguinal hernia repair (Right, 2014); abdominoplasty (2015); cataract phaco with iol (Right, 6/22/2015); carpal tunnel release (2012); salpingostomy (Bilateral, 1975); knee arthroscopy (Left, 7/6/2017); and medial meniscectomy (Left, 7/6/2017).    CURRENT MEDICATIONS  Home Medications     Reviewed by Tony Vernon (Pharmacy Tech) on 12/28/18 at 0934  Med List Status: Complete   Medication Last Dose Status   aspirin 81 MG tablet 12/27/2018 Active   Calcium-Magnesium-Vitamin D (CALCIUM 500 PO) 12/26/2018 Active   metoprolol SR (TOPROL XL) 25 MG TABLET SR 24 HR 12/26/2018 Active   pantoprazole (PROTONIX) 40 MG Tablet Delayed Response 12/27/2018 Active   simvastatin (ZOCOR) 40 MG Tab 12/26/2018 Active                ALLERGIES  No Known Allergies    PHYSICAL EXAM  VITAL SIGNS: BP (!) 169/84   Pulse 61   Temp 36.4 °C (97.6 °F) (Temporal)   Resp 18   Ht 1.549 m (5' 1\")   Wt 55.2 kg (121 lb 11.1 oz)   SpO2 96%   BMI 22.99 kg/m²   Pulse ox interpretation: I interpret this pulse ox as normal.  Constitutional: Alert in no apparent distress.  HENT: Normocephalic, atraumatic. Bilateral external ears normal, Nose normal. Moist mucous membranes.    Eyes: Pupils are equal and reactive, Conjunctiva normal.   Neck: Normal range of motion, Supple.  No JVD.  Lymphatic: No lymphadenopathy noted.   Cardiovascular: Regular rate and rhythm with frequent ectopy, no murmurs. Distal pulses intact.  No peripheral edema.  Thorax & Lungs: Normal breath sounds.  No wheezing/rales/ronchi. No increased work of breathing, clipped speech or retractions.   Abdomen: Soft, non-distended, non-tender to palpation. No palpable or pulsatile masses.   Skin: Warm, Dry, No erythema, No rash.   Musculoskeletal: Good range of motion in all major joints.   Neurologic: Alert , no gross focal deficit noted.  Psychiatric: Affect normal, Judgment normal, Mood normal.       DIAGNOSTIC STUDIES / " PROCEDURES    LABS  Results for orders placed or performed during the hospital encounter of 12/28/18   CBC w/ Differential   Result Value Ref Range    WBC 6.1 4.8 - 10.8 K/uL    RBC 4.44 4.20 - 5.40 M/uL    Hemoglobin 13.5 12.0 - 16.0 g/dL    Hematocrit 40.6 37.0 - 47.0 %    MCV 91.4 81.4 - 97.8 fL    MCH 30.4 27.0 - 33.0 pg    MCHC 33.3 (L) 33.6 - 35.0 g/dL    RDW 43.2 35.9 - 50.0 fL    Platelet Count 242 164 - 446 K/uL    MPV 9.8 9.0 - 12.9 fL    Neutrophils-Polys 62.20 44.00 - 72.00 %    Lymphocytes 25.50 22.00 - 41.00 %    Monocytes 8.10 0.00 - 13.40 %    Eosinophils 2.80 0.00 - 6.90 %    Basophils 1.20 0.00 - 1.80 %    Immature Granulocytes 0.20 0.00 - 0.90 %    Nucleated RBC 0.00 /100 WBC    Neutrophils (Absolute) 3.77 2.00 - 7.15 K/uL    Lymphs (Absolute) 1.54 1.00 - 4.80 K/uL    Monos (Absolute) 0.49 0.00 - 0.85 K/uL    Eos (Absolute) 0.17 0.00 - 0.51 K/uL    Baso (Absolute) 0.07 0.00 - 0.12 K/uL    Immature Granulocytes (abs) 0.01 0.00 - 0.11 K/uL    NRBC (Absolute) 0.00 K/uL   Complete Metabolic Panel (CMP)   Result Value Ref Range    Sodium 137 135 - 145 mmol/L    Potassium 3.5 (L) 3.6 - 5.5 mmol/L    Chloride 105 96 - 112 mmol/L    Co2 27 20 - 33 mmol/L    Anion Gap 5.0 0.0 - 11.9    Glucose 113 (H) 65 - 99 mg/dL    Bun 18 8 - 22 mg/dL    Creatinine 0.65 0.50 - 1.40 mg/dL    Calcium 9.0 8.4 - 10.2 mg/dL    AST(SGOT) 19 12 - 45 U/L    ALT(SGPT) 19 2 - 50 U/L    Alkaline Phosphatase 48 30 - 99 U/L    Total Bilirubin 0.6 0.1 - 1.5 mg/dL    Albumin 3.8 3.2 - 4.9 g/dL    Total Protein 6.5 6.0 - 8.2 g/dL    Globulin 2.7 1.9 - 3.5 g/dL    A-G Ratio 1.4 g/dL   Troponin STAT   Result Value Ref Range    Troponin I <0.02 0.00 - 0.04 ng/mL   Magnesium   Result Value Ref Range    Magnesium 2.0 1.5 - 2.5 mg/dL   Phosphorus   Result Value Ref Range    Phosphorus 3.0 2.5 - 4.5 mg/dL   APTT   Result Value Ref Range    APTT 29.0 24.7 - 36.0 sec   PT/INR   Result Value Ref Range    PT 13.2 12.0 - 14.6 sec    INR 1.01 0.87  - 1.13   TSH (for screening thyroid dysfunction)   Result Value Ref Range    TSH 0.970 0.380 - 5.330 uIU/mL   ESTIMATED GFR   Result Value Ref Range    GFR If African American >60 >60 mL/min/1.73 m 2    GFR If Non African American >60 >60 mL/min/1.73 m 2   EKG   Result Value Ref Range    Report       [facility] Emergency Dept.    Test Date:  2018  Pt Name:    NADEGE SMITH               Department: [facility]  MRN:        [id number]                      [facility]  Gender:     Female                       Technician: [id number]  :        1945                   Requested By:ER TRIAGE PROTOCOL  Order #:    [id number]                    Reading MD: SUARJ STACY DO    Measurements  Intervals                                Axis  Rate:       67                           P:          42  ND:         152                          QRS:        -30  QRSD:       80                           T:          18  QT:         392  QTc:        414    Interpretive Statements  SINUS RHYTHM  No ST elevations.  T-wave inversions Lead III  Inferioir Q waves  Normal intervals.  LEFT AXIS DEVIATION  Compared to ECG 2018 15:03:30  New T-wave inversions and inferior Q waves.    Electronically Signed On 2018 9:32:08 PST by SURAJ STACY DO         RADIOLOGY  DX-CHEST-PORTABLE (1 VIEW)   Final Result      No acute cardiopulmonary findings.          COURSE & MEDICAL DECISION MAKING  Nursing notes and vital signs were reviewed. (See chart for details)  The patients records were reviewed, history was obtained from the patient;     EKG review with new inferior Q waves and T wave inversions in lead III.  No ST elevation or STEMI at this time.  Rate controlled with frequent ectopy/PVC.    ED evaluation for palpitations and near syncope is unrevealing, although patient does have EKG changes from previous, most notably with isolated T wave inversions in lead III and inferior Q waves.  Continuous  telemetry at this facility without arrhythmia, although patient does have frequent ectopy.  No evidence for STEMI or ongoing ischemia.  Hemodynamically stable, well controlled blood pressure.  Never tachycardic or hypoxic.  No clinical evidence for DVT, doubt PE.  Labs are unremarkable including normal electrolytes, troponin and TSH.  No clinical or radiographic evidence for pneumonia, pleural effusion, pneumothorax or thoracic aortic dissection.  Patient is asymptomatic in the emergency department, but given recurrent symptoms of recent onset and EKG changes she will be admitted for further cardiac evaluation.  Patient and her daughter aware the findings and agreeable to the disposition and plan.    11:31 AM Dr. Holloway is aware the patient agreeable to admission.    FINAL IMPRESSION  (R55) Near syncope  (R00.2) Palpitations  (R94.31) Abnormal EKG      Electronically signed by: Nancy Luna, 12/28/2018 10:07 AM      This dictation was created using voice recognition software. The accuracy of the dictation is limited to the abilities of the software. I expect there may be some errors of grammar and possibly content. The nursing notes were reviewed and certain aspects of this information were incorporated into this note.

## 2018-12-28 NOTE — ED NOTES
Orthostatic vital signs  1039 Supine: /84: HR 64   1045 Sitting: /92; HR 65  1050 Standing: /84; HR 65

## 2018-12-28 NOTE — ASSESSMENT & PLAN NOTE
-None currently, monitor on telemetry  -Patient did have some T wave versions as well as inferior Q waves  -Patient is not having chest pain or cardiac equivalent  -She did have a negative stress test in January 2017, no repeat needed at this time  -Await echocardiogram

## 2018-12-28 NOTE — ED NOTES
Med Rec completed per patient  Allergies reviewed  No ORAL antibiotics in last 30 days     from office gave patient an  mg at 0830

## 2018-12-28 NOTE — PROGRESS NOTES
Assessment and admission completed. IV fluids hung. POC discussed. Pt hooked up to tele monitor. Pt A&Ox4, denies and pain. Pt currently laying in bed. No other concerns at this time.

## 2018-12-28 NOTE — ED NOTES
"Pt amb to rm#1 with daughter; c/o intermittent dizziness and intermittent \"palpitations' x10d. Pt seen at pcp this am, and instr to go to er  "

## 2018-12-28 NOTE — H&P
Hospital Medicine History & Physical Note    Date of Service  12/28/2018    Primary Care Physician  Zach Villalobos M.D.    Consultants  None    Code Status  Full    Chief Complaint  Near syncope, palpitations    History of Presenting Illness  73 y.o. female who presented 12/28/2018 with near syncope and palpitations.  Patient has a history of an arrhythmia which she cannot classify further, is on metoprolol for this.  Has had fast heartbeat as well in the past.  She has seen cardiology about this, they instructed her to take an extra metoprolol whenever this happens.  She was doing that for a while but states it was not working.  She states she can feel her heart beating rapidly, this lasts for approximately 2-3 hours.  She states the first time it happened was 10 days ago, now it is happening most days.  She also complains of some lightheadedness, this is also chronic, it used to last approximately once a month, lasting for seconds.  Over the last several days she has been having several episodes a day.  Prior and now they last just seconds however there is severe enough that she thinks she may pass out and has to hold onto something or else she would fall.  At the same time she becomes flushed.  Patient has not fully passed out, no trauma.  She denies any chest pain, no abdominal pain, no nausea or vomiting, no cardiac equivalent.  She went to see her primary care today, noted a concerning EKG and patient was transferred here.  Upon arrival she was noted to have some nonspecific T wave changes.  Initially patient did not feel that near syncope and the palpitations were related however with further discussions she thinks they may be at the same time however I am still not sure about this.    Review of Systems  Review of Systems   Constitutional: Negative for chills, fever and malaise/fatigue.   HENT: Negative for congestion.    Respiratory: Negative for cough, sputum production, shortness of breath and  stridor.    Cardiovascular: Positive for palpitations. Negative for chest pain and leg swelling.   Gastrointestinal: Negative for abdominal pain, constipation, diarrhea, nausea and vomiting.   Genitourinary: Negative for dysuria and urgency.   Musculoskeletal: Negative for falls and myalgias.   Neurological: Positive for dizziness. Negative for tingling, loss of consciousness, weakness and headaches.   Psychiatric/Behavioral: Negative for depression and suicidal ideas.   All other systems reviewed and are negative.      Past Medical History   has a past medical history of Anesthesia; Arrhythmia; CAD (coronary artery disease) (11/10/2010); Dizziness (2/2/2010); Heart burn; High cholesterol; Hypercholesteremia (11/10/2010); Indigestion; Murmur (11/10/2010); Palpitations (11/10/2010); Paroxysmal supraventricular tachycardia (HCC) (11/10/2010); and Unspecified cataract.    Surgical History   has a past surgical history that includes primary c section (1977, 1985); inguinal hernia repair (Right, 2014); abdominoplasty (2015); cataract phaco with iol (Right, 6/22/2015); carpal tunnel release (2012); salpingostomy (Bilateral, 1975); knee arthroscopy (Left, 7/6/2017); and medial meniscectomy (Left, 7/6/2017).     Family History  family history includes Cancer in her maternal aunt and mother; Heart Attack in her brother, brother, and father.     Social History   reports that she has never smoked. She has never used smokeless tobacco. She reports that she drinks alcohol. She reports that she does not use drugs.    Allergies  No Known Allergies    Medications  Prior to Admission Medications   Prescriptions Last Dose Informant Patient Reported? Taking?   Calcium-Magnesium-Vitamin D (CALCIUM 500 PO) 12/26/2018 at AM Patient Yes No   Sig: Take 2 Tabs by mouth every day.   aspirin 81 MG tablet 12/27/2018 at PM Patient Yes No   Sig: Take 81 mg by mouth every evening.   metoprolol SR (TOPROL XL) 25 MG TABLET SR 24 HR 12/26/2018 at PM  Patient No No   Sig: Take 2 Tabs by mouth every day.   pantoprazole (PROTONIX) 40 MG Tablet Delayed Response 12/27/2018 at AM Patient Yes No   Sig: Take 40 mg by mouth every day.   simvastatin (ZOCOR) 40 MG Tab 12/26/2018 at PM Patient No No   Sig: TAKE ONE TABLET BY MOUTH AT BEDTIME      Facility-Administered Medications: None       Physical Exam  Temp:  [36.4 °C (97.6 °F)] 36.4 °C (97.6 °F)  Pulse:  [60-73] 61  Resp:  [11-20] 18  BP: (145-169)/(84-96) 169/84    Physical Exam   Constitutional: She is oriented to person, place, and time. She appears well-developed. She is cooperative. No distress.   HENT:   Head: Normocephalic and atraumatic. Not macrocephalic and not microcephalic. Head is without raccoon's eyes and without Mireles's sign.   Right Ear: External ear normal.   Left Ear: External ear normal.   Mouth/Throat: Oropharynx is clear and moist. No oropharyngeal exudate.   Eyes: Conjunctivae are normal. Right eye exhibits no discharge. Left eye exhibits no discharge. No scleral icterus.   Neck: Neck supple. No tracheal deviation present.   Cardiovascular: Normal rate, regular rhythm and intact distal pulses.  Exam reveals no gallop, no distant heart sounds and no friction rub.    Murmur heard.  Pulmonary/Chest: Effort normal. No accessory muscle usage or stridor. No tachypnea and no bradypnea. No respiratory distress. She has no decreased breath sounds. She has no wheezes. She has no rhonchi. She has no rales. She exhibits no tenderness.   Abdominal: Soft. Bowel sounds are normal. She exhibits no distension. There is no hepatosplenomegaly, splenomegaly or hepatomegaly. There is no tenderness. There is no rebound and no guarding.   Musculoskeletal: Normal range of motion. She exhibits no edema or tenderness.   Lymphadenopathy:     She has no cervical adenopathy.   Neurological: She is alert and oriented to person, place, and time. No cranial nerve deficit. She displays no seizure activity.   Skin: Skin is warm,  dry and intact. No rash noted. She is not diaphoretic. No erythema. No pallor.   Psychiatric: She has a normal mood and affect. Her speech is normal and behavior is normal. Judgment and thought content normal. Cognition and memory are normal.   Nursing note and vitals reviewed.      Laboratory:  Recent Labs      12/28/18   0930   WBC  6.1   RBC  4.44   HEMOGLOBIN  13.5   HEMATOCRIT  40.6   MCV  91.4   MCH  30.4   MCHC  33.3*   RDW  43.2   PLATELETCT  242   MPV  9.8     Recent Labs      12/28/18   0930   SODIUM  137   POTASSIUM  3.5*   CHLORIDE  105   CO2  27   GLUCOSE  113*   BUN  18   CREATININE  0.65   CALCIUM  9.0     Recent Labs      12/28/18   0930   ALTSGPT  19   ASTSGOT  19   ALKPHOSPHAT  48   TBILIRUBIN  0.6   GLUCOSE  113*     Recent Labs      12/28/18   0930   APTT  29.0   INR  1.01             Recent Labs      12/28/18   0930   TROPONINI  <0.02       Urinalysis:    No results found     Imaging:  DX-CHEST-PORTABLE (1 VIEW)   Final Result      No acute cardiopulmonary findings.      EC-ECHOCARDIOGRAM COMPLETE W/O CONT    (Results Pending)         Assessment/Plan:  I anticipate this patient is appropriate for observation status at this time.    Near syncope- (present on admission)   Assessment & Plan    -Unsure if this is associated with her palpitations  -Regardless treat this as a syncopal episode due to the severity  -Monitor patient on telemetry  -Obtain echocardiogram  -If no palpitations or arrhythmia during the hospital stay, she may benefit from a Holter monitor     Palpitations- (present on admission)   Assessment & Plan    -None currently, monitor on telemetry  -Patient did have some T wave versions as well as inferior Q waves  -Patient is not having chest pain or cardiac equivalent  -She did have a negative stress test in January 2017, no repeat needed at this time  -Await echocardiogram     GERD (gastroesophageal reflux disease)   Assessment & Plan    -Continue home PPI     Hypokalemia    Assessment & Plan    -Place IV potassium and with IV fluids  -Repeat BMP in the morning         VTE prophylaxis: Lovenox

## 2018-12-28 NOTE — ASSESSMENT & PLAN NOTE
-Unsure if this is associated with her palpitations  -Regardless treat this as a syncopal episode due to the severity  -Monitor patient on telemetry  -Obtain echocardiogram  -If no palpitations or arrhythmia during the hospital stay, she may benefit from a Holter monitor

## 2018-12-29 VITALS
OXYGEN SATURATION: 99 % | WEIGHT: 125.66 LBS | DIASTOLIC BLOOD PRESSURE: 69 MMHG | SYSTOLIC BLOOD PRESSURE: 139 MMHG | BODY MASS INDEX: 23.73 KG/M2 | RESPIRATION RATE: 18 BRPM | HEIGHT: 61 IN | HEART RATE: 64 BPM | TEMPERATURE: 97.1 F

## 2018-12-29 PROBLEM — E87.6 HYPOKALEMIA: Status: RESOLVED | Noted: 2018-12-28 | Resolved: 2018-12-29

## 2018-12-29 LAB
ANION GAP SERPL CALC-SCNC: 2 MMOL/L (ref 0–11.9)
BUN SERPL-MCNC: 13 MG/DL (ref 8–22)
CALCIUM SERPL-MCNC: 8.4 MG/DL (ref 8.4–10.2)
CHLORIDE SERPL-SCNC: 114 MMOL/L (ref 96–112)
CO2 SERPL-SCNC: 23 MMOL/L (ref 20–33)
CREAT SERPL-MCNC: 0.54 MG/DL (ref 0.5–1.4)
ERYTHROCYTE [DISTWIDTH] IN BLOOD BY AUTOMATED COUNT: 44.9 FL (ref 35.9–50)
GLUCOSE SERPL-MCNC: 123 MG/DL (ref 65–99)
HCT VFR BLD AUTO: 35.2 % (ref 37–47)
HGB BLD-MCNC: 11.7 G/DL (ref 12–16)
MCH RBC QN AUTO: 31.1 PG (ref 27–33)
MCHC RBC AUTO-ENTMCNC: 33.2 G/DL (ref 33.6–35)
MCV RBC AUTO: 93.6 FL (ref 81.4–97.8)
PLATELET # BLD AUTO: 197 K/UL (ref 164–446)
PMV BLD AUTO: 9.8 FL (ref 9–12.9)
POTASSIUM SERPL-SCNC: 4.3 MMOL/L (ref 3.6–5.5)
RBC # BLD AUTO: 3.76 M/UL (ref 4.2–5.4)
SODIUM SERPL-SCNC: 139 MMOL/L (ref 135–145)
WBC # BLD AUTO: 5.3 K/UL (ref 4.8–10.8)

## 2018-12-29 PROCEDURE — A9270 NON-COVERED ITEM OR SERVICE: HCPCS | Performed by: INTERNAL MEDICINE

## 2018-12-29 PROCEDURE — 700102 HCHG RX REV CODE 250 W/ 637 OVERRIDE(OP): Performed by: INTERNAL MEDICINE

## 2018-12-29 PROCEDURE — 85027 COMPLETE CBC AUTOMATED: CPT

## 2018-12-29 PROCEDURE — 700101 HCHG RX REV CODE 250: Performed by: INTERNAL MEDICINE

## 2018-12-29 PROCEDURE — 99217 PR OBSERVATION CARE DISCHARGE: CPT | Performed by: HOSPITALIST

## 2018-12-29 PROCEDURE — G0378 HOSPITAL OBSERVATION PER HR: HCPCS

## 2018-12-29 PROCEDURE — 80048 BASIC METABOLIC PNL TOTAL CA: CPT

## 2018-12-29 PROCEDURE — A9270 NON-COVERED ITEM OR SERVICE: HCPCS | Performed by: HOSPITALIST

## 2018-12-29 PROCEDURE — 99214 OFFICE O/P EST MOD 30 MIN: CPT | Performed by: INTERNAL MEDICINE

## 2018-12-29 PROCEDURE — 700102 HCHG RX REV CODE 250 W/ 637 OVERRIDE(OP): Performed by: HOSPITALIST

## 2018-12-29 RX ORDER — METOPROLOL SUCCINATE 25 MG/1
25 TABLET, EXTENDED RELEASE ORAL 2 TIMES DAILY
Qty: 60 TAB | Refills: 0
Start: 2018-12-29 | End: 2019-01-28

## 2018-12-29 RX ORDER — FLECAINIDE ACETATE 50 MG/1
50 TABLET ORAL 2 TIMES DAILY
Qty: 60 TAB | Refills: 0 | Status: SHIPPED | OUTPATIENT
Start: 2018-12-29 | End: 2019-01-28 | Stop reason: SDUPTHER

## 2018-12-29 RX ORDER — FLECAINIDE ACETATE 50 MG/1
50 TABLET ORAL TWICE DAILY
Status: DISCONTINUED | OUTPATIENT
Start: 2018-12-29 | End: 2018-12-29 | Stop reason: HOSPADM

## 2018-12-29 RX ADMIN — OMEPRAZOLE 20 MG: 20 CAPSULE, DELAYED RELEASE ORAL at 06:31

## 2018-12-29 RX ADMIN — POTASSIUM CHLORIDE 40 MEQ: 1500 TABLET, EXTENDED RELEASE ORAL at 06:31

## 2018-12-29 RX ADMIN — POTASSIUM CHLORIDE AND SODIUM CHLORIDE: 900; 150 INJECTION, SOLUTION INTRAVENOUS at 03:10

## 2018-12-29 RX ADMIN — FLECAINIDE ACETATE 50 MG: 50 TABLET ORAL at 09:43

## 2018-12-29 NOTE — PROGRESS NOTES
· 2 RN skin check complete.   · Skin check with Asmita   · Devices in place tele.  · Skin assessed under devices intact.  · Confirmed pressure ulcers found on none.  · New potential pressure ulcers noted on none. Wound consult placed and wound reported.  · The following interventions in place patient turns self*

## 2018-12-29 NOTE — PROGRESS NOTES
Telemetry Shift Summary    Rhythm SR/SB; 6 episodes of PSVT throughout shift lasting for or less than 10 seconds each  HR Range 50s-70s; up to 160s during PSVT runs  Ectopy occasional PAC  Measurements 0.12/0.08/0.36        Normal Values  Rhythm SR  HR Range    Measurements 0.12-0.20 / 0.06-0.10  / 0.30-0.52

## 2018-12-29 NOTE — CARE PLAN
Problem: Communication  Goal: The ability to communicate needs accurately and effectively will improve  Outcome: PROGRESSING AS EXPECTED  Pt educated on the use of the call light, pt uses appropriately. Pt educated on medications, test, and updated on POC.     Problem: Safety  Goal: Will remain free from falls  Outcome: PROGRESSING AS EXPECTED  Pt is a low risk for falls, yellow fall risk arm band in place. Call light and belongings in reach, bed in locked and low position, siderails up x2

## 2018-12-29 NOTE — PROGRESS NOTES
Received bedside report from Asmita ESCUDERO RN. Assumed care of pt who is resting in bed with eyes closed, RR even/unlabored. Fall protocol in place. CLIP. Will continue to monitor.    Neurovascularly intact without any focal sensory/motor deficits. Cranial nerves: II-XII intact, grossly non-focal.  Psychiatric:  Alert and oriented, thought content appropriate, normal insight  Capillary Refill: Brisk,< 3 seconds   Peripheral Pulses: +2 palpable, equal bilaterally       Labs:     Recent Labs      08/19/18   1526   WBC  6.4   HGB  12.6   HCT  36.8   PLT  199     Recent Labs      08/19/18   1526   NA  144   K  4.4   CL  107   CO2  25   BUN  31*   CREATININE  1.6*   CALCIUM  9.5     Recent Labs      08/19/18   1526   AST  19   ALT  12   BILITOT  0.3   ALKPHOS  81     No results for input(s): INR in the last 72 hours. Recent Labs      08/19/18   1526   TROPONINI  <0.01       Urinalysis:      Lab Results   Component Value Date    NITRU POSITIVE 08/19/2018    WBCUA 10-20 08/19/2018    BACTERIA 4+ 08/19/2018    RBCUA 0-2 08/19/2018    BLOODU Negative 08/19/2018    SPECGRAV 1.015 08/19/2018    GLUCOSEU Negative 08/19/2018       Radiology:     CXR: I have reviewed the CXR with the following interpretation: NAD  EKG:  I have reviewed the EKG with the following interpretation: 1st deg AV block, no afib at present    XR CHEST STANDARD (2 VW)   Final Result   No acute findings. CT Head WO Contrast   Final Result   No acute intracranial abnormality. ASSESSMENT:    Active Hospital Problems    Diagnosis     TIA (transient ischemic attack) [G45.9], L sided face and arm numbness, resolved prior to arrival in ED       UTI (urinary tract infection) [N39.0]       Dizziness [R42], chronic process       Hypertension [I10]       Atrial fibrillation (HCC) [I48.91]      PLAN:    Admit to Tele  AM labs, incl FLP    Obtain Carotid Salo, MRI brain w/o contrast, TTE     mg chewable x 1, then ASA EC 81 mg daily in am  Cont home Lipitor pending repeat FLP  Permissive HTN, prn Labetalol, holding home Metoprolol and Lisinopril    Await urine cx.   Empiric IV Rocephin    Cont home Eliquis, as suspicion for moderate to large CVA low, thus risk hemorrhagic conversion low    DVT Prophylaxis: Eliquis  Diet:   Reg  Code Status: Full    PT/OT Eval Status: Ordered    Dispo - Home in 2 days    Discussed with pt, RN, son at bedside       Holley Canales MD    Thank you Verna Gomez MD for the opportunity to be involved in this patient's care. If you have any questions or concerns please feel free to contact me at [phone].

## 2018-12-29 NOTE — PROGRESS NOTES
Tele Note    Rhythm sr, psvt 7.6 seconds rate 170's   Rate 60's  IN 0.12  QRS 0.08  QT 0.40  Ectopy none

## 2018-12-29 NOTE — PROGRESS NOTES
Spoke with Dr. Holloway about patient going into psvt for 7.6 seconds with rate of 170. Continuing with plan of care.

## 2018-12-29 NOTE — PROGRESS NOTES
Assessment completed, IV fluids infusing per mar. Patient denies any pain, has slight ear ache but does not wish for any intervention at this time. Comfortable in bed, wishes to ambulate in lguo shortly. Encouraged to mobilize with standby assist. Will CTM.

## 2018-12-29 NOTE — PROGRESS NOTES
"Family and patient become quite anxious about going home. \"I have family at my house, Im done I want to leave now. Get the doctor.\" Spoke with Dr Ford, cardiology will be by to see patient today. Educated patient and family about cardio visit. Educated on psvt and new medication again. Verbalized understanding.   "

## 2018-12-29 NOTE — PROGRESS NOTES
Education given on SVT and new medications from [facility]. Patient and family spent time going over information. Medicated per MAR. Pt anxious about going home due to family being in town. Discussed plan of care with patient and family.

## 2018-12-29 NOTE — CARE PLAN
Problem: Infection  Goal: Will remain free from infection  Outcome: PROGRESSING AS EXPECTED    Intervention: Implement standard precautions and perform hand washing before and after patient contact  Standard precautions and hand hygiene practices implemented before and after patient room entry. Gloves worn during times of patient contact.        Problem: Knowledge Deficit  Goal: Knowledge of disease process/condition, treatment plan, diagnostic tests, and medications will improve  Outcome: PROGRESSING AS EXPECTED    Intervention: Assess knowledge level of disease process/condition, treatment plan, diagnostic tests, and medications  Patient's knowledge of plan of care, diagnostics, and medications regularly assessed. RN answers patient questions appropriately, education provided. Patient verbalizes better understanding of their condition.

## 2018-12-29 NOTE — CARE PLAN
"Problem: Safety  Goal: Will remain free from falls    Intervention: Implement fall precautions   12/29/18 1114   OTHER   Environmental Precautions Treaded Slipper Socks on Patient;Report Given to Other Health Care Providers Regarding Fall Risk;Mobility Assessed & Appropriate Sign Placed;Personal Belongings, Wastebasket, Call Bell etc. in Easy Reach;Bed in Low Position;Communication Sign for Patients & Families;Transferred to Stronger Side   IV Pole on Same Side of Bed as Bathroom Yes   Bedrails Bedrails Closest to Bathroom Down   Chair/Bed Strip Alarm Patient Educated Regarding Fall Risk and Need for Bed Alarm, Understands and Continues to Refuse     Pt aaox4, up self at this time with steady gait. Discussed walking with patient. \"I will go for a walk later.\" goal to walk 20 feet today.       Problem: Knowledge Deficit  Goal: Knowledge of disease process/condition, treatment plan, diagnostic tests, and medications will improve    Intervention: Explain information regarding disease process/condition, treatment plan, diagnostic tests, and medications and document in education  Krames handouts printed about psvt and new medication. Educated patient on information. Patient and family verbalized understanding.         "

## 2018-12-29 NOTE — PROGRESS NOTES
Alerted of 10 second run of PSVT @ 2332 by Monitor Kenny Pike, patient rounded on, asleep and seemingly asymptomatic. Will continue to monitor.

## 2018-12-29 NOTE — CONSULTS
"Cardiology Consult Note    DOS: 12/29/2018    Consulting physician: Tabby Ford    Chief complaint/Reason for consult: SVT    HPI:  Patient is a 74 yo WF. Well known to me. She has a history of non-obstructive CAD but no evidence of structural heart disease, scar, or ischemia. She has been seen for PSVT most of which has been nonsustained atrial tachycardia. I had her on BB for this in the office which for the most part was working. She said recently a week and half ago she had a 3 hour episode. Extra BB did not help. Yesterday she started having intermittent dizziness and lightheaded spells only lasting seconds. She was admitted to telemetry for observation and on observation again with frequent PACs overnight and nonsustained atrial tachycardia.     ROS (+ highlighted in red):  Constitutional: Fevers/chills/fatigue/weightloss  HEENT: Blurry vision/eye pain/sore throat/hearing loss  Respiratory: Shortness of breath/cough  Cardiovascular: Chest pain/palpitations/edema/orthopnea/syncope  GI: Nausea/vomitting/diarrhea  MSK: Arthralgias/myagias/muscle weakness  Skin: Rash/sores  Neurological: Numbness/tremors/vertigo  Endocrine: Excessive thirst/polyuria/cold intolerance/heat intolerance  Psych: Depression/anxiety    Past Medical History:   Diagnosis Date   • Anesthesia     \"couldn't open eye or talk\"   • Arrhythmia     PSVT   • CAD (coronary artery disease) 11/10/2010   • Dizziness 2/2/2010   • Heart burn    • High cholesterol    • Hypercholesteremia 11/10/2010   • Indigestion    • Murmur 11/10/2010   • Palpitations 11/10/2010   • Paroxysmal supraventricular tachycardia (HCC) 11/10/2010   • Unspecified cataract     IOL OD       Past Surgical History:   Procedure Laterality Date   • KNEE ARTHROSCOPY Left 7/6/2017    Procedure: KNEE ARTHROSCOPY;  Surgeon: Al Heredia M.D.;  Location: SURGERY [facility];  Service:    • MEDIAL MENISCECTOMY Left 7/6/2017    Procedure: MEDIAL MENISCECTOMY - PARTIAL, MUIR;  " Surgeon: Al Heredia M.D.;  Location: SURGERY [facility];  Service:    • CATARACT PHACO WITH IOL Right 2015    Procedure: CATARACT PHACO WITH IOL;  Surgeon: David Llanos M.D.;  Location: SURGERY [facility];  Service:    • ABDOMINOPLASTY  2015    and face lift   • INGUINAL HERNIA REPAIR Right 2014   • CARPAL TUNNEL RELEASE  2012   • SALPINGOSTOMY Bilateral 1975    repair of tubes   • PRIMARY C SECTION  ,        Social History     Social History   • Marital status:      Spouse name: N/A   • Number of children: N/A   • Years of education: N/A     Occupational History   •  Other     Social History Main Topics   • Smoking status: Never Smoker   • Smokeless tobacco: Never Used   • Alcohol use Yes      Comment: Occasionally   • Drug use: No   • Sexual activity: Not on file     Other Topics Concern   • Not on file     Social History Narrative   • No narrative on file       Family History   Problem Relation Age of Onset   • Cancer Mother          at age 60; breast cancer   • Heart Attack Father          at age 83 of heart attack.   • Heart Attack Brother         heart attack at age 41 and bypass surgery   • Heart Attack Brother         bypass surgery at age 62   • Cancer Maternal Aunt        No Known Allergies    Current Facility-Administered Medications   Medication Dose Route Frequency Provider Last Rate Last Dose   • flecainide (TAMBOCOR) tablet 50 mg  50 mg Oral TWICE DAILY Tabby Ford M.D.   50 mg at 18 0943   • aspirin (ASA) chewable tab 81 mg  81 mg Oral Q EVENING AJ Logan.O.       • metoprolol SR (TOPROL XL) tablet 50 mg  50 mg Oral Q EVENING Grady Holloway D.O.   50 mg at 18 1825   • simvastatin (ZOCOR) tablet 40 mg  40 mg Oral Q EVENING Grady Holloway D.O.   40 mg at 18 1824   • senna-docusate (PERICOLACE or SENOKOT S) 8.6-50 MG per tablet 2 Tab  2 Tab Oral BID AJ Logan.O.        And   • polyethylene  glycol/lytes (MIRALAX) PACKET 1 Packet  1 Packet Oral QDAY PRN Grady Holloway D.O.        And   • magnesium hydroxide (MILK OF MAGNESIA) suspension 30 mL  30 mL Oral QDAY PRN Grady Holloway D.O.        And   • bisacodyl (DULCOLAX) suppository 10 mg  10 mg Rectal QDAY PRN Grady Holloway D.O.       • 0.9 % NaCl with KCl 20 mEq infusion   Intravenous Continuous Grady Holloway D.O. 75 mL/hr at 12/29/18 0310     • enoxaparin (LOVENOX) inj 40 mg  40 mg Subcutaneous DAILY RAINE LoganO.   40 mg at 12/28/18 1507   • acetaminophen (TYLENOL) tablet 650 mg  650 mg Oral Q6HRS PRN Grady Holloway D.O.       • enalaprilat (VASOTEC) injection 1.25 mg  1.25 mg Intravenous Q6HRS PRN Grady Holloway D.O.       • ondansetron (ZOFRAN) syringe/vial injection 4 mg  4 mg Intravenous Q4HRS PRN Grady Holloway D.O.       • ondansetron (ZOFRAN ODT) dispertab 4 mg  4 mg Oral Q4HRS PRN RAINE LoganO.       • omeprazole (PRILOSEC) capsule 20 mg  20 mg Oral DAILY RAINE LoganO.   20 mg at 12/29/18 0631       Physical Exam:  Vitals:    12/29/18 0825 12/29/18 0826 12/29/18 0827 12/29/18 1150   BP: 138/68 137/74 130/72 139/69   Pulse: 69 67 72 64   Resp: 18 18 18 18   Temp: 36.6 °C (97.8 °F)   36.2 °C (97.1 °F)   TempSrc: Oral   Oral   SpO2: 96% 94% 94% 99%   Weight:       Height:         General appearance: NAD, conversant   Eyes: anicteric sclerae, moist conjunctivae; no lid-lag; PERRLA  HENT: Atraumatic; oropharynx clear with moist mucous membranes and no mucosal ulcerations; normal hard and soft palate  Neck: Trachea midline; FROM, supple, no thyromegaly or lymphadenopathy  Lungs: CTA, with normal respiratory effort and no intercostal retractions  CV: RRR, no MRGs, no JVD   Abdomen: Soft, non-tender; no masses or HSM  Extremities: No peripheral edema or extremity lymphadenopathy  Skin: Normal temperature, turgor and texture; no rash, ulcers or subcutaneous nodules  Psych: Appropriate affect, alert and oriented to  person, place and time    Data:  Labs reviewed    Prior echo/stress results reviewed:  Normal LV function  No ischemia on prior nuclear study    CXR interpreted by me:  WNL    EKG interpreted by me:   NSR     Impression/Plan:  1)Palpitations  2)Lightheadedness  3)SVT    -I have reviewed the telemetry strips  -SVT episodes are nonsustained and begin with PAC with warm up period before speeding up and terminating spontaneously  -I think most consistent with atrial tachycardia  -I have again reassured her that these are benign  -For her symptoms, given no evidence of structural heart disease and no obstructive CAD, I think IC agent may be safe for her  -I recommended flecainide 50 BID in addition to her BB  -She is going to [place] soon, I will have her seen in clinic after she comes back to see how she is doing    Eugene Vela MD

## 2018-12-29 NOTE — PROGRESS NOTES
Bedside report given to Asmita SANTO. POC discussed. Pt resting comfortably in bed. Safety precautions in place.

## 2018-12-29 NOTE — DISCHARGE INSTRUCTIONS
Discharge Instructions    Discharged to home by car with relative. Discharged via wheelchair, hospital escort: Yes.  Special equipment needed: Not Applicable    Be sure to schedule a follow-up appointment with your primary care doctor or any specialists as instructed.     Discharge Plan:   Dx:  SVT, now on flecainide   Condition:  Stable   Diet:  Regular; limit caffeine intake   Activity:  As tolerated   Meds:  Continue home meds ADD flecainide 50mg bid, split toprol 25 bid instead of 50 at night   F/U with Dr. Vela as needed, he will schedule   Return to ER if any dizziness, sob, chest pain, intractable palpitations  Diet Plan: Discussed  Activity Level: Discussed  Confirmed Follow up Appointment: Appointment Scheduled  Confirmed Symptoms Management: Discussed  Medication Reconciliation Updated: Yes  Influenza Vaccine Indication: Not indicated: Previously immunized this influenza season and > 8 years of age    I understand that a diet low in cholesterol, fat, and sodium is recommended for good health. Unless I have been given specific instructions below for another diet, I accept this instruction as my diet prescription.   Other diet: None    Special Instructions: None    · Is patient discharged on Warfarin / Coumadin?   No     Depression / Suicide Risk    As you are discharged from this [facility] facility, it is important to learn how to keep safe from harming yourself.    Recognize the warning signs:  · Abrupt changes in personality, positive or negative- including increase in energy   · Giving away possessions  · Change in eating patterns- significant weight changes-  positive or negative  · Change in sleeping patterns- unable to sleep or sleeping all the time   · Unwillingness or inability to communicate  · Depression  · Unusual sadness, discouragement and loneliness  · Talk of wanting to die  · Neglect of personal appearance   · Rebelliousness- reckless behavior  · Withdrawal from people/activities they  love  · Confusion- inability to concentrate     If you or a loved one observes any of these behaviors or has concerns about self-harm, here's what you can do:  · Talk about it- your feelings and reasons for harming yourself  · Remove any means that you might use to hurt yourself (examples: pills, rope, extension cords, firearm)  · Get professional help from the community (Mental Health, Substance Abuse, psychological counseling)  · Do not be alone:Call your Safe Contact- someone whom you trust who will be there for you.  · Call your local CRISIS HOTLINE 626-0737 or 952-699-3239  · Call your local Children's Mobile Crisis Response Team [place] (315) 319-0211 or www.[employer]  · Call the toll free National Suicide Prevention Hotlines   · National Suicide Prevention Lifeline 785-553-PWSL (9050)  · [employer] 800-SUICIDE (989-9916)      Supraventricular Tachycardia, Adult  Supraventricular tachycardia (SVT) is a type of abnormal heart rhythm. It causes the heart to beat very quickly and then return to a normal speed.  A normal heart rate is  beats per minute. During an episode of SVT, your heart rate may be higher than 150 beats per minute. Episodes of SVT can be frightening, but they are usually not dangerous. However, if episodes happens often or last for long periods of time, they may lead to heart failure.  What are the causes?  Usually, a normal heartbeat starts when an area called the sinoatrial node releases an electrical signal. In SVT, other areas of the heart send out electrical signals that interfere with the signal from the sinoatrial node.  It is not known why some people get SVT and others do not.  What increases the risk?  This condition is more likely to develop in:  · People who are 12?30 years old.  · Women.  Factors that may increase your chances of an attack include:  · Stress.  · Tiredness.  · Smoking.  · Stimulant drugs, such as cocaine and  methamphetamine.  · Alcohol.  · Caffeine.  · Pregnancy.  · Anxiety.  What are the signs or symptoms?  Symptoms of this condition include:  · A pounding heart.  · A feeling that the heart is skipping beats (palpitations).  · Weakness.  · Shortness of breath.  · Tightness or pain in your chest.  · Light-headedness.  · Anxiety.  · Dizziness.  · Sweating.  · Nausea.  · Fainting.  · Fatigue or tiredness.  A mild episode may not cause symptoms.  How is this diagnosed?  This condition may be diagnosed based on:  · Your symptoms.  · A physical exam. If you are have an episode of SVT during the exam, the health care provider may be able to diagnose SVT by listening to your heart and feeling your pulse.  · Tests. These may include:  ¨ An electrocardiogram (ECG). This test is done to check for problems with electrical activity in the heart.  ¨ A Holter monitor or event monitor test. This test involves wearing a portable device that monitors your heart rate over time.  ¨ An echocardiogram. This test involves taking an image of your heart using sound waves. It is done to rule out other causes of a fast heart rate.  ¨ Blood tests.  How is this treated?  This condition may be treated with:  · Vagal nerve stimulation. The treatment involves stimulating your vagus nerve, which slows down the heart. It is often the first and only treatment that is needed for this condition. It is a good idea to try the several ways of doing vagal stimulation to find which one works best for you. Ways to do this treatment include:  ¨ Holding your breath and pushing, as though you are having a bowel movement.  ¨ Massaging an area on one side of your neck, below your jaw. Do not try this yourself. Only a health care provider should do this. If done the wrong way, it can lead to a stroke.  ¨ Bending forward with your head between your legs.  ¨ Coughing while bending forward with your head between your legs.  ¨ Closing your eyes and massaging your  eyeballs. A health care provider should guide you through this method before you try it on your own.  · Medicines that prevent attacks.  · Medicine to stop an attack. The medicine is given through an IV tube at the hospital.  · A small electric shock (cardioversion) that stops an attack. Before you get the shock, you will get medicine to make you fall asleep.  · Radiofrequency ablation. In this procedure, a small, thin tube (catheter) is used to send radiofrequency energy to the area of tissue that is causing the rapid heartbeats. The energy kills the cells and helps your heart keep a normal rhythm. You may have this treatment if you have symptoms of SVT often.  If you do not have symptoms, you may not need treatment.  Follow these instructions at home:  Stress  · Avoid stressful situations when possible.  · Find healthy ways of managing stress that work for you. Some healthy ways to manage stress include:  ¨ Taking part in relaxing activities, such as yoga, meditation, or being out in nature.  ¨ Listening to relaxing music.  ¨ Practicing relaxation techniques, such as deep breathing.  ¨ Leading a healthy lifestyle. This involves getting plenty of sleep, exercising, and eating a balanced diet.  ¨ Attending counseling or talk therapy with a mental health professional.  Sleep  · Try to get at least 7 hours of sleep each night.  Tobacco and nicotine  · Do not use any products that contain nicotine or tobacco, such as cigarettes and e-cigarettes. If you need help quitting, ask your health care provider.  Alcohol  · If alcohol triggers episodes of SVT, do not drink alcohol.  · If alcohol does not seem to trigger episodes, limit alcohol intake to no more than 1 drink a day for nonpregnant women and 2 drinks a day for men. One drink equals 12 oz of beer, 5 oz of wine, or 1½ oz of hard liquor.  Caffeine  · If caffeine triggers episodes of SVT, do not eat, drink, or use anything with caffeine in it.  · If caffeine does not  seem to trigger episodes, consume caffeine in moderation.  Stimulant drugs  · Do not use stimulant drugs. If you need help quitting, talk with your health care provider.  General instructions  · Maintain a healthy weight.  · Exercise regularly. Ask your health care provider to suggest some good activities for you. Aim for one or a combination of the following:  ¨ 150 minutes per week of moderate exercise, such as walking or yoga.  ¨ 75 minutes per week of vigorous exercise, such as running or swimming.  · Perform vagus nerve stimulation as directed by your health care provider.  · Take over-the-counter and prescription medicines only as told by your health care provider.  Contact a health care provider if:  · You have episodes of SVT more often than before.  · Episodes of SVT last longer than before.  · Vagus nerve stimulation is no longer helping.  · You have new symptoms.  Get help right away if:  · You have chest pain.  · Your symptoms get worse.  · You have trouble breathing.  · You have an episode of SVT that lasts longer than 20 minutes.  · You faint.  These symptoms may represent a serious problem that is an emergency. Do not wait to see if the symptoms will go away. Get medical help right away. Call your local emergency services (911 in the [place] Do not drive yourself to the hospital.   This information is not intended to replace advice given to you by your health care provider. Make sure you discuss any questions you have with your health care provider.  Document Released: 12/18/2006 Document Revised: 08/24/2017 Document Reviewed: 08/24/2017  [employer] Interactive Patient Education © 2017 [employer]    Flecainide tablets  What is this medicine?  FLECAINIDE (FLEK a nide) is an antiarrhythmic drug. This medicine is used to prevent irregular heart rhythm. It can also slow down fast heartbeats called tachycardia.  This medicine may be used for other purposes; ask your health care provider or pharmacist if  you have questions.  COMMON BRAND NAME(S): Maicol  What should I tell my health care provider before I take this medicine?  They need to know if you have any of these conditions:  -abnormal levels of potassium in the blood  -heart disease including heart rhythm and heart rate problems  -kidney or liver disease  -recent heart attack  -an unusual or allergic reaction to flecainide, local anesthetics, other medicines, foods, dyes, or preservatives  -pregnant or trying to get pregnant  -breast-feeding  How should I use this medicine?  Take this medicine by mouth with a glass of water. Follow the directions on the prescription label. You can take this medicine with or without food. Take your doses at regular intervals. Do not take your medicine more often than directed. Do not stop taking this medicine suddenly. This may cause serious, heart-related side effects. If your doctor wants you to stop the medicine, the dose may be slowly lowered over time to avoid any side effects.  Talk to your pediatrician regarding the use of this medicine in children. While this drug may be prescribed for children as young as 1 year of age for selected conditions, precautions do apply.  Overdosage: If you think you have taken too much of this medicine contact a poison control center or emergency room at once.  NOTE: This medicine is only for you. Do not share this medicine with others.  What if I miss a dose?  If you miss a dose, take it as soon as you can. If it is almost time for your next dose, take only that dose. Do not take double or extra doses.  What may interact with this medicine?  Do not take this medicine with any of the following medications:  -amoxapine  -arsenic trioxide  -certain antibiotics like clarithromycin, erythromycin, gatifloxacin, gemifloxacin, levofloxacin, moxifloxacin, sparfloxacin, or troleandomycin  -certain antidepressants called tricyclic antidepressants like amitriptyline, imipramine, or  nortriptyline  -certain medicines to control heart rhythm like disopyramide, dofetilide, encainide, moricizine, procainamide, propafenone, and quinidine  -cisapride  -cyclobenzaprine  -delavirdine  -droperidol  -haloperidol  -hawthorn  -imatinib  -levomethadyl  -maprotiline  -medicines for malaria like chloroquine and halofantrine  -pentamidine  -phenothiazines like chlorpromazine, mesoridazine, prochlorperazine, thioridazine  -pimozide  -quinine  -ranolazine  -ritonavir  -sertindole  -ziprasidone  This medicine may also interact with the following medications:  -cimetidine  -medicines for angina or high blood pressure  -medicines to control heart rhythm like amiodarone and digoxin  This list may not describe all possible interactions. Give your health care provider a list of all the medicines, herbs, non-prescription drugs, or dietary supplements you use. Also tell them if you smoke, drink alcohol, or use illegal drugs. Some items may interact with your medicine.  What should I watch for while using this medicine?  Visit your doctor or health care professional for regular checks on your progress. Because your condition and the use of this medicine carries some risk, it is a good idea to carry an identification card, necklace or bracelet with details of your condition, medications and doctor or health care professional.  Check your blood pressure and pulse rate regularly. Ask your health care professional what your blood pressure and pulse rate should be, and when you should contact him or her. Your doctor or health care professional also may schedule regular blood tests and electrocardiograms to check your progress.  You may get drowsy or dizzy. Do not drive, use machinery, or do anything that needs mental alertness until you know how this medicine affects you. Do not stand or sit up quickly, especially if you are an older patient. This reduces the risk of dizzy or fainting spells. Alcohol can make you more dizzy,  increase flushing and rapid heartbeats. Avoid alcoholic drinks.  What side effects may I notice from receiving this medicine?  Side effects that you should report to your doctor or health care professional as soon as possible:  -chest pain, continued irregular heartbeats  -difficulty breathing  -swelling of the legs or feet  -trembling, shaking  -unusually weak or tired  Side effects that usually do not require medical attention (report to your doctor or health care professional if they continue or are bothersome):  -blurred vision  -constipation  -headache  -nausea, vomiting  -stomach pain  This list may not describe all possible side effects. Call your doctor for medical advice about side effects. You may report side effects to FDA at 9-211-USC-7177.  Where should I keep my medicine?  Keep out of the reach of children.  Store at room temperature between 15 and 30 degrees C (59 and 86 degrees F). Protect from light. Keep container tightly closed. Throw away any unused medicine after the expiration date.  NOTE: This sheet is a summary. It may not cover all possible information. If you have questions about this medicine, talk to your doctor, pharmacist, or health care provider.  © 2018 Elsevier/Gold Standard (2009-04-22 16:46:09)    Infection Control in the Home  If you have an infection or you are taking care of someone who has an infection, it is important to know how to keep the infection from spreading. Follow these guidelines to help stop the spread of infection, and talk to your health care provider.  HOW ARE INFECTIONS SPREAD?  In order for an infection to spread, the following must be present:  · A germ. This may be a virus, bacteria, fungus, or parasite.  · A place for the germ to live. This may be:  ¨ On or in a person, animal, plant, or food.  ¨ In soil or water.  ¨ On surfaces, such as a door handle.  · A susceptible host. This is a person or animal who does not have resistance (immunity) to the  germ.  · A way for the germ to enter the host. This may occur by:  ¨ Direct contact. This may happen by making contact--such as shaking hands or hugging--with an infected person or animal. Some germs can also travel through the air and spread to you if an infected person coughs or sneezes on you or near you.  ¨ Indirect contact. This is when the germ enters the host through contact with an infected object. Examples include eating contaminated food, drinking contaminated water, or touching a contaminated surface with your hands and then touching your face, nose, or mouth soon after that.  HOW CAN I HELP TO PREVENT INFECTION FROM SPREADING?  There are several things that you can do to help prevent infection from spreading.  Hand Washing  It is very important to wash your hands correctly, following these steps:  2. Wet your hands with clean, running water.  3. Apply soap to your hands. Liquid soap is better than bar soap.  4. Rub your hands together quickly to create lather.  5. Keep rubbing your hands together for at least 20 seconds. Thoroughly scrub all parts of your hands, including under your fingernails and between your fingers.  6. Rinse your hands with clean, running water until all of the soap is gone.  7. Dry your hands with an air dryer or a clean paper or cloth towel, or let your hands air-dry. Do not use your clothing or a soiled towel to dry your hands.  8. If you are in a public restroom, use your towel to turn off the water faucet and to open the bathroom door.  Make sure to wash your hands:  · Before:  ¨ Visiting a baby or anyone with a weakened or lowered defense (immune) system.  ¨ Putting in and taking out any contact lenses.  · After:  ¨ Working or playing outside.  ¨ Touching an animal or its toys or leash.  ¨ Handling livestock.  ¨ Using the bathroom or helping a child or adult to use the bathroom.  ¨ Using household  or toxic chemicals.  ¨ Touching or taking out the garbage.  ¨ Touching  anything dirty around your home.  ¨ Handling soiled clothes or rags.  ¨ Taking care of a sick child. This includes touching used tissues, toys, and clothes.  ¨ Sneezing, coughing, or blowing your nose.  ¨ Using public transportation.  ¨ Shaking hands.  ¨ Using a phone, including your mobile phone.  ¨ Touching money.  · Before and after:  ¨ Preparing food.  ¨ Preparing a bottle for a baby.  ¨ Feeding a baby or a young child.  ¨ Eating.  ¨ Visiting or taking care of someone who is sick.  ¨ Changing a diaper.  ¨ Changing a bandage (dressing) or taking care of an injury or wound.  ¨ Giving or taking medicine.  Taking Care of Your Home  · Make sure that you have enough cleaning supplies at all times. These include:  ¨ Disinfectants.  ¨ Reusable cleaning cloths. Wash these after each use.  ¨ Paper towels.  ¨ Utility gloves. Replace your gloves if they are cracked or torn or if they start to peel.  · Use bleach safely. Never mix it with other cleaning products, especially those that contain ammonia. This mixture can create a dangerous gas that may be deadly.  · Take care of your cleaning supplies. Toilet brushes, mops, and sponges can breed germs. Soak them in bleach and water for 5 minutes after each use.  · Do not pour used mop water down the sink. Pour it down the toilet instead.  · Maintain proper ventilation in your home.  · If you have a pet, ensure that your pet stays clean. Do not let people with weak immune systems touch bird droppings, fish tank water, or a litter box.  ¨ If you have a cat, be sure to change the litter every day.  · In the bathroom, make sure you:  ¨ Provide liquid soap.  ¨ Change towels and washcloths frequently. Avoid sharing towels and washcloths.  ¨ Change toothbrushes often and store them separately in a clean, dry place.  ¨ Disinfect the toilet.  ¨ Clean the tub, shower, and sink with standard cleaning products.    ¨ Mop the floor with a standard .  ¨ Do not share personal items,  such as razors, toothbrushes, drinking glasses, deodorant, christopher, brushes, towels, and washcloths.    · In the kitchen, make sure you:  ¨ Store food carefully.  ¨ Refrigerate leftovers promptly in covered containers.  ¨ Throw out stale or spoiled food.  ¨ Clean the inside of your refrigerator each week.  ¨ Keep your refrigerator set at 40°F (4°C) or less, and set your freezer at 0°F (-18°C) or less.  ¨ Thaw foods in the refrigerator or microwave, not at room temperature.  ¨ Serve foods at the proper temperature. Do not eat raw meat. Make sure it is cooked to the appropriate temperature. Cook eggs until they are firm.  ¨ Wash fruits and vegetables under running water.  ¨ Use separate cutting boards, plates, and utensils for raw foods and cooked foods.  ¨ Keep work surfaces clean.  ¨ Use a clean spoon each time you sample food while cooking.  ¨ Wash your dishes in hot, soapy water. Air-dry your dishes or use a .  ¨ Do not share forks, cups, or spoons during meals.  · Wear gloves if laundry is visibly soiled.  · Change linens each week or whenever they are soiled.  · Do not shake soiled linens. Doing that may send germs into the air. Put dressings, sanitary or incontinence pads, diapers, and gloves in plastic garbage bags for disposal.     This information is not intended to replace advice given to you by your health care provider. Make sure you discuss any questions you have with your health care provider.     Document Released: 09/26/2009 Document Revised: 01/08/2016 Document Reviewed: 08/20/2015  Elsevier Interactive Patient Education ©2016 [employer]

## 2018-12-29 NOTE — PROGRESS NOTES
Received report from day shift RN Summer/Mala. Plan of care discussed at bedside. Patient resting comfortably in bed at this time with no complaints. Safety precautions and hourly rounding in place.

## 2018-12-29 NOTE — PROGRESS NOTES
Pt discharged to home. Discharge instructions provided to pt. Pt verbalizes understanding. Pt states all questions have been answered. Signed copy in chart. Prescriptions sent to [facility]. Pt states that all personal belongings are in possession. Pt off unit via walking, escorted by CNA.

## 2018-12-30 NOTE — DISCHARGE SUMMARY
Discharge Summary    CHIEF COMPLAINT ON ADMISSION  Chief Complaint   Patient presents with   • Dizziness   • Palpitations       Reason for Admission  Sent by MD, Dizziness     Admission Date  12/28/2018    CODE STATUS  Prior    HPI & HOSPITAL COURSE  This is a 73 y.o. female here with a history of non-obstructive CAD but no evidence of structural heart disease, scar, or ischemia. She has been followed by Dr. Vela for PSVT, most of which has been nonsustained atrial tachycardia. He had her on toprol for this as an outpatient which for the most part he said was working, but a week and half ago she had a 3 hour episode and taking extra toprol did not help. The day prior to admission, she started having intermittent dizziness and lightheaded spells lasting seconds at a time. She was admitted to telemetry for observation and multiple episodes of nonsustained SVT were seen.      Dr. Vela was consulted and came to see the patient, assuring her that  The nonsustained SVT was benign, but he did also add flecainide to her regimen, and told her to split up the doses of metoprolol in the day.  He will follow up with her after her trip to [facility].       Therefore, she is discharged in good and stable condition to home with close outpatient follow-up.      Discharge Date  12/29/2018    FOLLOW UP ITEMS POST DISCHARGE  Return to ER if any dizziness, sob, chest pain, intractable palpitations    DISCHARGE DIAGNOSES  Active Problems:    Palpitations (Chronic) POA: Yes    GERD (gastroesophageal reflux disease) POA: Unknown  Resolved Problems:    Near syncope POA: Yes    Hypokalemia POA: Unknown      FOLLOW UP  Future Appointments  Date Time Provider Department Center   2/13/2019 11:00 AM Grady Oneill M.D. [facility] None   3/18/2019 11:30 [facility]     Zach Villalobos M.D.  [address] [place] [address] [id number]  [address]  [facility] [address]  [phone]      Call Monday to schedule a hospital follow-up  appoitnment.      MEDICATIONS ON DISCHARGE     Medication List      START taking these medications      Instructions   flecainide 50 MG tablet  Commonly known as:  TAMBOCOR   Take 1 Tab by mouth 2 Times a Day for 30 days.  Dose:  50 mg        CHANGE how you take these medications      Instructions   metoprolol SR 25 MG Tb24  What changed:  · how much to take  · when to take this  Commonly known as:  TOPROL XL   Take 1 Tab by mouth 2 Times a Day for 30 days.  Dose:  25 mg        CONTINUE taking these medications      Instructions   aspirin 81 MG tablet   Take 81 mg by mouth every evening.  Dose:  81 mg     CALCIUM 500 PO   Take 2 Tabs by mouth every day.  Dose:  2 Tab     pantoprazole 40 MG Tbec  Commonly known as:  PROTONIX   Take 40 mg by mouth every day.  Dose:  40 mg     simvastatin 40 MG Tabs  Commonly known as:  ZOCOR   TAKE ONE TABLET BY MOUTH AT BEDTIME            Allergies  No Known Allergies    DIET  No orders of the defined types were placed in this encounter.      ACTIVITY  As tolerated.  Weight bearing as tolerated    CONSULTATIONS  Cardiology Dr. Vela    PROCEDURES  None    LABORATORY  Lab Results   Component Value Date    SODIUM 139 12/29/2018    POTASSIUM 4.3 12/29/2018    CHLORIDE 114 (H) 12/29/2018    CO2 23 12/29/2018    GLUCOSE 123 (H) 12/29/2018    BUN 13 12/29/2018    CREATININE 0.54 12/29/2018        Lab Results   Component Value Date    WBC 5.3 12/29/2018    HEMOGLOBIN 11.7 (L) 12/29/2018    HEMATOCRIT 35.2 (L) 12/29/2018    PLATELETCT 197 12/29/2018

## 2019-01-28 DIAGNOSIS — I47.10 PSVT (PAROXYSMAL SUPRAVENTRICULAR TACHYCARDIA): ICD-10-CM

## 2019-01-29 RX ORDER — FLECAINIDE ACETATE 50 MG/1
50 TABLET ORAL 2 TIMES DAILY
Qty: 120 TAB | Refills: 0 | Status: SHIPPED | OUTPATIENT
Start: 2019-01-29 | End: 2019-02-28

## 2019-02-13 ENCOUNTER — OFFICE VISIT (OUTPATIENT)
Dept: CARDIOLOGY | Facility: MEDICAL CENTER | Age: 74
End: 2019-02-13
Payer: MEDICARE

## 2019-02-13 VITALS
OXYGEN SATURATION: 94 % | BODY MASS INDEX: 22.73 KG/M2 | HEIGHT: 61 IN | DIASTOLIC BLOOD PRESSURE: 72 MMHG | SYSTOLIC BLOOD PRESSURE: 120 MMHG | HEART RATE: 58 BPM | WEIGHT: 120.4 LBS

## 2019-02-13 DIAGNOSIS — E78.00 HYPERCHOLESTEREMIA: Chronic | ICD-10-CM

## 2019-02-13 DIAGNOSIS — I47.19 PAT (PAROXYSMAL ATRIAL TACHYCARDIA): ICD-10-CM

## 2019-02-13 DIAGNOSIS — Z79.899 LONG TERM CURRENT USE OF ANTIARRHYTHMIC MEDICAL THERAPY: ICD-10-CM

## 2019-02-13 DIAGNOSIS — R00.2 PALPITATIONS: Chronic | ICD-10-CM

## 2019-02-13 DIAGNOSIS — I25.10 CORONARY ARTERY DISEASE, NON-OCCLUSIVE: ICD-10-CM

## 2019-02-13 LAB — EKG IMPRESSION: NORMAL

## 2019-02-13 PROCEDURE — 99214 OFFICE O/P EST MOD 30 MIN: CPT | Performed by: INTERNAL MEDICINE

## 2019-02-13 PROCEDURE — 93000 ELECTROCARDIOGRAM COMPLETE: CPT | Performed by: INTERNAL MEDICINE

## 2019-02-13 RX ORDER — PHENAZOPYRIDINE HYDROCHLORIDE 100 MG/1
TABLET, FILM COATED ORAL
COMMUNITY
Start: 2018-11-29 | End: 2019-02-13

## 2019-02-13 RX ORDER — METOPROLOL SUCCINATE 25 MG/1
25 TABLET, EXTENDED RELEASE ORAL 2 TIMES DAILY
COMMUNITY
End: 2019-02-13 | Stop reason: SDUPTHER

## 2019-02-13 RX ORDER — DOXYCYCLINE HYCLATE 100 MG
TABLET ORAL
COMMUNITY
Start: 2019-01-17 | End: 2019-02-13

## 2019-02-13 RX ORDER — METOPROLOL SUCCINATE 25 MG/1
25 TABLET, EXTENDED RELEASE ORAL 2 TIMES DAILY
Qty: 180 TAB | Refills: 3 | Status: SHIPPED | OUTPATIENT
Start: 2019-02-13 | End: 2020-11-20 | Stop reason: SDUPTHER

## 2019-02-13 ASSESSMENT — ENCOUNTER SYMPTOMS
MYALGIAS: 0
DIZZINESS: 0
PALPITATIONS: 0
COUGH: 0
SHORTNESS OF BREATH: 0
LOSS OF CONSCIOUSNESS: 0

## 2019-02-13 NOTE — PROGRESS NOTES
"Chief Complaint   Patient presents with   • Palpitations   • Dizziness       Subjective:   Shaniqua Solis is a 73 y.o. female who presents today for follow-up evaluation of CAD, palpitations, PAT, CAD, mild aortic regurgitation with nonsustained VT and hyperlipidemia.     Last seen on 12/28/2018 at [facility] by Dr. Eugene Vela for recurrent PAT.  Flecainide was added to metoprolol..    Since 12/2018 hospital discharge the patient has had no cardiac symptoms.  Feels flecainide is like a \"miracle\".  In January she took a trip to [facility] and developed respiratory congestion ultimately returning to be diagnosed with pneumonia started on antibiotics.  Planning a one-month trip to the couple's in [place].    Past medical history   Was seen by Dr. Vela for symptomatic palpitations related to what he felt was PAT.  Metoprolol dose was doubled.  The patient also cut back on caffeine.  Nonetheless the patient continues to have daily sometimes frequent palpitations which are bothersome.  No syncope.  Noninvasive evaluation was normal.     Past medical history  Having also supple daily palpitations with some lightheadedness since September.  Holter monitor showed supraventricular tachycardia and one episode of nonsustained ventricular tachycardia.  No angina pectoris.  Has continued on metoprolol and simvastatin and aspirin.    Past Medical History:   Diagnosis Date   • Anesthesia     \"couldn't open eye or talk\"   • Arrhythmia     PSVT   • CAD (coronary artery disease) 11/10/2010   • Dizziness 2/2/2010   • Heart burn    • High cholesterol    • Hypercholesteremia 11/10/2010   • Indigestion    • Murmur 11/10/2010   • Palpitations 11/10/2010   • Paroxysmal supraventricular tachycardia (HCC) 11/10/2010   • Unspecified cataract     IOL OD     Past Surgical History:   Procedure Laterality Date   • KNEE ARTHROSCOPY Left 7/6/2017    Procedure: KNEE ARTHROSCOPY;  Surgeon: Al Heredia M.D.;  Location: SURGERY [facility]" ORS;  Service:    • MEDIAL MENISCECTOMY Left 2017    Procedure: MEDIAL MENISCECTOMY - PARTIAL, MUIR;  Surgeon: Al Heredia M.D.;  Location: SURGERY [facility] ORS;  Service:    • CATARACT PHACO WITH IOL Right 2015    Procedure: CATARACT PHACO WITH IOL;  Surgeon: David Llanos M.D.;  Location: SURGERY [facility] ORS;  Service:    • ABDOMINOPLASTY      and face lift   • INGUINAL HERNIA REPAIR Right    • CARPAL TUNNEL RELEASE     • SALPINGOSTOMY Bilateral 1975    repair of tubes   • PRIMARY C SECTION  ,      Family History   Problem Relation Age of Onset   • Cancer Mother          at age 60; breast cancer   • Heart Attack Father          at age 83 of heart attack.   • Heart Attack Brother         heart attack at age 41 and bypass surgery   • Heart Attack Brother         bypass surgery at age 62   • Cancer Maternal Aunt      Social History     Social History   • Marital status:      Spouse name: N/A   • Number of children: N/A   • Years of education: N/A     Occupational History   •  Other     Social History Main Topics   • Smoking status: Never Smoker   • Smokeless tobacco: Never Used   • Alcohol use Yes      Comment: Occasionally   • Drug use: No   • Sexual activity: Not on file     Other Topics Concern   • Not on file     Social History Narrative   • No narrative on file     No Known Allergies  Outpatient Encounter Prescriptions as of 2019   Medication Sig Dispense Refill   • metoprolol SR (TOPROL XL) 25 MG TABLET SR 24 HR Take 1 Tab by mouth 2 Times a Day. 180 Tab 3   • flecainide (TAMBOCOR) 50 MG tablet Take 1 Tab by mouth 2 Times a Day for 30 days. 120 Tab 0   • pantoprazole (PROTONIX) 40 MG Tablet Delayed Response Take 40 mg by mouth every day.     • simvastatin (ZOCOR) 40 MG Tab TAKE ONE TABLET BY MOUTH AT BEDTIME 30 Tab 11   • Calcium-Magnesium-Vitamin D (CALCIUM 500 PO) Take 2 Tabs by mouth every day.     • aspirin 81 MG tablet Take  "81 mg by mouth every evening.     • [DISCONTINUED] doxycycline (VIBRAMYCIN) 100 MG Tab      • [DISCONTINUED] phenazopyridine (PYRIDIUM) 100 MG Tab      • [DISCONTINUED] metoprolol SR (TOPROL XL) 25 MG TABLET SR 24 HR Take 25 mg by mouth 2 Times a Day.       No facility-administered encounter medications on file as of 2/13/2019.      Review of Systems   Respiratory: Negative for cough and shortness of breath.    Cardiovascular: Negative for chest pain and palpitations.   Musculoskeletal: Negative for myalgias.   Neurological: Negative for dizziness and loss of consciousness.        Objective:   /72 (BP Location: Left arm, Patient Position: Sitting, BP Cuff Size: Adult)   Pulse (!) 58   Ht 1.549 m (5' 1\")   Wt 54.6 kg (120 lb 6.4 oz)   SpO2 94%   BMI 22.75 kg/m²     Physical Exam   Constitutional: She is oriented to person, place, and time. She appears well-developed and well-nourished. No distress.   Neck: No JVD present.   Cardiovascular: Normal rate, regular rhythm and intact distal pulses.  Exam reveals no gallop and no friction rub.    Murmur heard.  Pulmonary/Chest: Effort normal and breath sounds normal. No respiratory distress. She has no wheezes. She has no rales.   Abdominal: Soft. She exhibits no distension and no mass. There is no tenderness.   Musculoskeletal: She exhibits no edema.   Neurological: She is alert and oriented to person, place, and time.   Skin: Skin is warm and dry.   Psychiatric: She has a normal mood and affect. Her behavior is normal.     2/17/2012 MPI  1)   Normal exercise Cardiolite stress test with no evidence of  ischemia or infarction.   2)  Normal left ventricular systolic function with a calculated  ejection fraction of 86%.   3)  No chest pain reported, and no ischemic EKG changes seen.   4)  Average exercise tolerance.   5)  No prior nuclear stress test available for comparison.       04/04/2012 Lab: Cholesterol 178. Triglycerides 146. HDL 49. .   "   ECHOCARDIOGRAM 11/29/2010  EF 84%. Mild LVH. 1-2+ AR. 1+ MR. PSP 22-27 mmHg.     ECHOCARDIOGRAM 01/05/2017  Normal left ventricular systolic function.  Moderate concentric left ventricular hypertrophy.  Left ventricular ejection fraction is visually estimated to be 70%.  Aortic sclerosis without stenosis.  Mild aortic insufficiency.  Right heart pressures are normal.  No prior study is available for comparison.    ECHOCARDIOGRAM 12/28/2018  Mild concentric left ventricular hypertrophy.  Left ventricular ejection fraction is visually estimated to be 70%.  Normal regional wall motion.  Grade II diastolic dysfunction.  Mild aortic insufficiency.  Mild mitral regurgitation..  Estimated right ventricular systolic pressure  is 40  mmHg.    MPI 01/05/2017  Normal myocardial perfusion with no ischemia.   Normal left ventricular wall motion.  LV ejection fraction = 73%.   ECG INTERPRETATION   Negative stress ECG for ischemia.    01/23/2003 CARDIAC CATHETERIZATION  EF 67%.  50-70% diagonal stenosis.  Treated medically.     11/02/2016 HOLTER MONITOR  Normal sinus rhythm.  Multiple episodes of SVT.  Nonsustained VT.        Assessment:     1. Palpitations  EKG   2. PAT (paroxysmal atrial tachycardia) ([facility])  EKG   3. Long term current use of antiarrhythmic medical therapy     4. Coronary artery disease, non-occlusive     5. Hypercholesteremia         Medical Decision Making:  Today's Assessment / Status / Plan:   Palpitations. Recurrent.     PAT. Recurrent, now stable on flecainaide and metoprolol.     CAD: Diagonal stenosis, asymptomatic. Continue current therapy.    Aortic regurgitation.  Mild.     Hypercholesterolemia: On atorvastatin.    Nonsustained ventricular tachycardia.     Recommendations and Discussion  1. The patient's cardiac condition is stable.  2. Continue current cardiac therapy.  3. Follow up with Dr Vela in 1-2 months.  4. Follow up in 6 months.

## 2019-03-18 ENCOUNTER — OUTPATIENT INFUSION SERVICES (OUTPATIENT)
Dept: ONCOLOGY | Facility: MEDICAL CENTER | Age: 74
End: 2019-03-18
Attending: FAMILY MEDICINE
Payer: MEDICARE

## 2019-03-18 VITALS
HEIGHT: 59 IN | TEMPERATURE: 97.3 F | BODY MASS INDEX: 24.58 KG/M2 | OXYGEN SATURATION: 99 % | DIASTOLIC BLOOD PRESSURE: 72 MMHG | HEART RATE: 57 BPM | WEIGHT: 121.91 LBS | SYSTOLIC BLOOD PRESSURE: 136 MMHG | RESPIRATION RATE: 18 BRPM

## 2019-03-18 LAB
CA-I BLD ISE-SCNC: 1.23 MMOL/L (ref 1.1–1.3)
CREAT BLD-MCNC: 0.7 MG/DL (ref 0.5–1.4)

## 2019-03-18 PROCEDURE — 82330 ASSAY OF CALCIUM: CPT

## 2019-03-18 PROCEDURE — 96401 CHEMO ANTI-NEOPL SQ/IM: CPT

## 2019-03-18 PROCEDURE — 82565 ASSAY OF CREATININE: CPT

## 2019-03-18 PROCEDURE — 700111 HCHG RX REV CODE 636 W/ 250 OVERRIDE (IP): Mod: JG | Performed by: FAMILY MEDICINE

## 2019-03-18 RX ORDER — FLECAINIDE ACETATE 100 MG/1
100 TABLET ORAL 2 TIMES DAILY
COMMUNITY
End: 2019-03-26 | Stop reason: SDUPTHER

## 2019-03-18 RX ADMIN — DENOSUMAB 60 MG: 60 INJECTION SUBCUTANEOUS at 11:54

## 2019-03-18 NOTE — PROGRESS NOTES
Patient arrived for Prolia injection; reports no recent or upcoming dental procedures.  Istat Ca/Cr lab drawn from R AC with 23G butterfly.  Pressure dressing applied.  Results within parameters, Prolia injected to back R arm. Tolerated well.  Pt to f/u with MD prior to next appt time.  Discharged home in great spirits under no apparent distress.

## 2019-03-18 NOTE — PROGRESS NOTES
Pharmacy note  Cr = 0.7 mg/dL, CrCl ~ 62 ml/min   Ionized Ca = 1.23  Last Prolia 08/22/18    OK for denosumab (Prolia) 60 mg SQ today    Christiano Donis, PharmD, BCOP

## 2019-03-26 DIAGNOSIS — I47.19 PAT (PAROXYSMAL ATRIAL TACHYCARDIA): Primary | ICD-10-CM

## 2019-03-26 RX ORDER — FLECAINIDE ACETATE 50 MG/1
50 TABLET ORAL 2 TIMES DAILY
Qty: 180 TAB | Refills: 3 | Status: SHIPPED | OUTPATIENT
Start: 2019-03-26 | End: 2020-03-18

## 2019-04-03 DIAGNOSIS — E78.00 HYPERCHOLESTEREMIA: ICD-10-CM

## 2019-04-03 RX ORDER — SIMVASTATIN 40 MG
TABLET ORAL
Qty: 100 TAB | Refills: 3 | Status: SHIPPED | OUTPATIENT
Start: 2019-04-03 | End: 2020-06-26 | Stop reason: SDUPTHER

## 2019-04-22 ENCOUNTER — HOSPITAL ENCOUNTER (OUTPATIENT)
Dept: RADIOLOGY | Facility: MEDICAL CENTER | Age: 74
End: 2019-04-22
Attending: PAIN MEDICINE
Payer: MEDICARE

## 2019-04-22 DIAGNOSIS — M54.2 NECK PAIN: ICD-10-CM

## 2019-06-05 ENCOUNTER — HOSPITAL ENCOUNTER (OUTPATIENT)
Facility: MEDICAL CENTER | Age: 74
End: 2019-06-05
Attending: PHYSICIAN ASSISTANT
Payer: MEDICARE

## 2019-06-05 PROCEDURE — 87046 STOOL CULTR AEROBIC BACT EA: CPT

## 2019-06-05 PROCEDURE — 87899 AGENT NOS ASSAY W/OPTIC: CPT

## 2019-06-05 PROCEDURE — 87045 FECES CULTURE AEROBIC BACT: CPT

## 2019-06-06 LAB
E COLI SXT1+2 STL IA: NORMAL
SIGNIFICANT IND 70042: NORMAL
SITE SITE: NORMAL
SOURCE SOURCE: NORMAL

## 2019-06-08 LAB
BACTERIA STL CULT: NORMAL
E COLI SXT1+2 STL IA: NORMAL
SIGNIFICANT IND 70042: NORMAL
SITE SITE: NORMAL
SOURCE SOURCE: NORMAL

## 2019-06-11 ENCOUNTER — OFFICE VISIT (OUTPATIENT)
Dept: CARDIOLOGY | Facility: MEDICAL CENTER | Age: 74
End: 2019-06-11
Payer: MEDICARE

## 2019-06-11 VITALS
WEIGHT: 123 LBS | HEIGHT: 59 IN | BODY MASS INDEX: 24.8 KG/M2 | HEART RATE: 52 BPM | DIASTOLIC BLOOD PRESSURE: 66 MMHG | SYSTOLIC BLOOD PRESSURE: 138 MMHG | OXYGEN SATURATION: 96 %

## 2019-06-11 DIAGNOSIS — Z51.81 ENCOUNTER FOR MONITORING FLECAINIDE THERAPY: ICD-10-CM

## 2019-06-11 DIAGNOSIS — R00.1 SINUS BRADYCARDIA: ICD-10-CM

## 2019-06-11 DIAGNOSIS — I25.10 CORONARY ARTERY DISEASE, NON-OCCLUSIVE: ICD-10-CM

## 2019-06-11 DIAGNOSIS — I47.10 PSVT (PAROXYSMAL SUPRAVENTRICULAR TACHYCARDIA): Primary | ICD-10-CM

## 2019-06-11 DIAGNOSIS — Z79.899 ENCOUNTER FOR MONITORING FLECAINIDE THERAPY: ICD-10-CM

## 2019-06-11 PROCEDURE — 99214 OFFICE O/P EST MOD 30 MIN: CPT | Performed by: INTERNAL MEDICINE

## 2019-06-11 PROCEDURE — 93000 ELECTROCARDIOGRAM COMPLETE: CPT | Performed by: INTERNAL MEDICINE

## 2019-06-11 NOTE — PROGRESS NOTES
"Arrhythmia Clinic Note (Established patient)    DOS: 6/11/2019    Chief complaint/Reason for consult: F/u SVT    Interval History:  Patient is a 72 yo. History of non-obstructive CAD. Otherwise normal function. Frequent PACs and AT that is symptomatic. We started flecainide for. Doing well. No further recurrence. She is happy with how the medication is working. Just returned from a couple of weeks in [facility]. She has a time-share there. Does complain of some exertional dyspnea since doubling BB and starting flec.    ROS (+ highlighted in red):  Constitutional: Fevers/chills/fatigue/weightloss  HEENT: Blurry vision/eye pain/sore throat/hearing loss  Respiratory: Shortness of breath/cough  Cardiovascular: Chest pain/palpitations/edema/orthopnea/syncope  GI: Nausea/vomitting/diarrhea  MSK: Arthralgias/myagias/muscle weakness  Skin: Rash/sores  Neurological: Numbness/tremors/vertigo  Endocrine: Excessive thirst/polyuria/cold intolerance/heat intolerance  Psych: Depression/anxiety    Past Medical History:   Diagnosis Date   • Anesthesia     \"couldn't open eye or talk\"   • Arrhythmia     PSVT   • CAD (coronary artery disease) 11/10/2010   • Dizziness 2/2/2010   • Heart burn    • High cholesterol    • Hypercholesteremia 11/10/2010   • Indigestion    • Murmur 11/10/2010   • Palpitations 11/10/2010   • Paroxysmal supraventricular tachycardia (HCC) 11/10/2010   • Unspecified cataract     IOL OD       Past Surgical History:   Procedure Laterality Date   • KNEE ARTHROSCOPY Left 7/6/2017    Procedure: KNEE ARTHROSCOPY;  Surgeon: Al Heredia M.D.;  Location: [facility];  Service:    • MEDIAL MENISCECTOMY Left 7/6/2017    Procedure: MEDIAL MENISCECTOMY - PARTIAL, MUIR;  Surgeon: Al Heredia M.D.;  Location: [facility];  Service:    • CATARACT PHACO WITH IOL Right 6/22/2015    Procedure: CATARACT PHACO WITH IOL;  Surgeon: David Llanos M.D.;  Location: [facility] ORS;  " "Service:    • ABDOMINOPLASTY  2015    and face lift   • INGUINAL HERNIA REPAIR Right 2014   • CARPAL TUNNEL RELEASE  2012   • SALPINGOSTOMY Bilateral 1975    repair of tubes   • PRIMARY C SECTION  ,        Social History     Social History   • Marital status:      Spouse name: N/A   • Number of children: N/A   • Years of education: N/A     Occupational History   •  Other     Social History Main Topics   • Smoking status: Never Smoker   • Smokeless tobacco: Never Used   • Alcohol use Yes      Comment: Occasionally   • Drug use: No   • Sexual activity: Not on file     Other Topics Concern   • Not on file     Social History Narrative   • No narrative on file       Family History   Problem Relation Age of Onset   • Cancer Mother          at age 60; breast cancer   • Heart Attack Father          at age 83 of heart attack.   • Heart Attack Brother         heart attack at age 41 and bypass surgery   • Heart Attack Brother         bypass surgery at age 62   • Cancer Maternal Aunt        No Known Allergies    Current Outpatient Prescriptions   Medication Sig Dispense Refill   • simvastatin (ZOCOR) 40 MG Tab TAKE ONE TABLET BY MOUTH AT BEDTIME 100 Tab 3   • flecainide (TAMBOCOR) 50 MG tablet Take 1 Tab by mouth 2 times a day. 180 Tab 3   • metoprolol SR (TOPROL XL) 25 MG TABLET SR 24 HR Take 1 Tab by mouth 2 Times a Day. 180 Tab 3   • pantoprazole (PROTONIX) 40 MG Tablet Delayed Response Take 40 mg by mouth every day.     • Calcium-Magnesium-Vitamin D (CALCIUM 500 PO) Take 2 Tabs by mouth every day.     • aspirin 81 MG tablet Take 81 mg by mouth every evening.       No current facility-administered medications for this visit.        Physical Exam:  Vitals:    19 0820   BP: 138/66   BP Location: Left arm   Patient Position: Sitting   BP Cuff Size: Adult   Pulse: (!) 52   SpO2: 96%   Weight: 55.8 kg (123 lb)   Height: 1.505 m (4' 11.25\")     General appearance: NAD, conversant   Eyes: " anicteric sclerae, moist conjunctivae; no lid-lag; PERRLA  HENT: Atraumatic; oropharynx clear with moist mucous membranes and no mucosal ulcerations; normal hard and soft palate  Neck: Trachea midline; FROM, supple, no thyromegaly or lymphadenopathy  Lungs: CTA, with normal respiratory effort and no intercostal retractions  CV: RRR, no MRGs, no JVD  Abdomen: Soft, non-tender; no masses or HSM  Extremities: No peripheral edema or extremity lymphadenopathy  Skin: Normal temperature, turgor and texture; no rash, ulcers or subcutaneous nodules  Psych: Appropriate affect, alert and oriented to person, place and time    Data:  Labs reviewed    Prior echo/stress reviewed:  Normal echo  No ischemia on stress    EKG interpreted by me:  Sinus bobby, QRS looks okay    Impression/Plan:  1. PSVT (paroxysmal supraventricular tachycardia) (HCC)  EKG   2. Encounter for monitoring flecainide therapy     3. Coronary artery disease, non-occlusive     4. Sinus bradycardia       -I will lower her Toprol back down to 25 a day  -This might help her exertional tolerance as I think this may be related to some sick sinus she has due to the drugs  -Continue flecainide 50 BID  -Otherwise doing well  -F/u in 6 mo    Eugene Vela MD

## 2019-06-12 LAB — EKG IMPRESSION: NORMAL

## 2019-08-13 ENCOUNTER — HOSPITAL ENCOUNTER (OUTPATIENT)
Dept: LAB | Facility: MEDICAL CENTER | Age: 74
End: 2019-08-13
Attending: FAMILY MEDICINE
Payer: MEDICARE

## 2019-08-13 LAB
ALBUMIN SERPL BCP-MCNC: 3.9 G/DL (ref 3.2–4.9)
ALBUMIN/GLOB SERPL: 1.3 G/DL
ALP SERPL-CCNC: 46 U/L (ref 30–99)
ALT SERPL-CCNC: 14 U/L (ref 2–50)
ANION GAP SERPL CALC-SCNC: 7 MMOL/L (ref 0–11.9)
AST SERPL-CCNC: 16 U/L (ref 12–45)
BASOPHILS # BLD AUTO: 1.6 % (ref 0–1.8)
BASOPHILS # BLD: 0.09 K/UL (ref 0–0.12)
BILIRUB SERPL-MCNC: 0.7 MG/DL (ref 0.1–1.5)
BUN SERPL-MCNC: 16 MG/DL (ref 8–22)
C DIFF DNA SPEC QL NAA+PROBE: NEGATIVE
C DIFF TOX GENS STL QL NAA+PROBE: NEGATIVE
CALCIUM SERPL-MCNC: 9.6 MG/DL (ref 8.5–10.5)
CHLORIDE SERPL-SCNC: 107 MMOL/L (ref 96–112)
CO2 SERPL-SCNC: 28 MMOL/L (ref 20–33)
CREAT SERPL-MCNC: 0.75 MG/DL (ref 0.5–1.4)
EOSINOPHIL # BLD AUTO: 0.17 K/UL (ref 0–0.51)
EOSINOPHIL NFR BLD: 3.1 % (ref 0–6.9)
ERYTHROCYTE [DISTWIDTH] IN BLOOD BY AUTOMATED COUNT: 46.7 FL (ref 35.9–50)
GLOBULIN SER CALC-MCNC: 3 G/DL (ref 1.9–3.5)
GLUCOSE SERPL-MCNC: 114 MG/DL (ref 65–99)
HCT VFR BLD AUTO: 41.7 % (ref 37–47)
HGB BLD-MCNC: 13.2 G/DL (ref 12–16)
IMM GRANULOCYTES # BLD AUTO: 0.02 K/UL (ref 0–0.11)
IMM GRANULOCYTES NFR BLD AUTO: 0.4 % (ref 0–0.9)
LIPASE SERPL-CCNC: 19 U/L (ref 11–82)
LYMPHOCYTES # BLD AUTO: 1.71 K/UL (ref 1–4.8)
LYMPHOCYTES NFR BLD: 30.9 % (ref 22–41)
MCH RBC QN AUTO: 30.6 PG (ref 27–33)
MCHC RBC AUTO-ENTMCNC: 31.7 G/DL (ref 33.6–35)
MCV RBC AUTO: 96.8 FL (ref 81.4–97.8)
MONOCYTES # BLD AUTO: 0.54 K/UL (ref 0–0.85)
MONOCYTES NFR BLD AUTO: 9.8 % (ref 0–13.4)
NEUTROPHILS # BLD AUTO: 3 K/UL (ref 2–7.15)
NEUTROPHILS NFR BLD: 54.2 % (ref 44–72)
NRBC # BLD AUTO: 0 K/UL
NRBC BLD-RTO: 0 /100 WBC
PLATELET # BLD AUTO: 261 K/UL (ref 164–446)
PMV BLD AUTO: 10.3 FL (ref 9–12.9)
POTASSIUM SERPL-SCNC: 4.3 MMOL/L (ref 3.6–5.5)
PROT SERPL-MCNC: 6.9 G/DL (ref 6–8.2)
RBC # BLD AUTO: 4.31 M/UL (ref 4.2–5.4)
SODIUM SERPL-SCNC: 142 MMOL/L (ref 135–145)
WBC # BLD AUTO: 5.5 K/UL (ref 4.8–10.8)

## 2019-08-13 PROCEDURE — 83690 ASSAY OF LIPASE: CPT

## 2019-08-13 PROCEDURE — 80053 COMPREHEN METABOLIC PANEL: CPT

## 2019-08-13 PROCEDURE — 85025 COMPLETE CBC W/AUTO DIFF WBC: CPT

## 2019-08-13 PROCEDURE — 36415 COLL VENOUS BLD VENIPUNCTURE: CPT

## 2019-08-13 PROCEDURE — 87493 C DIFF AMPLIFIED PROBE: CPT

## 2019-12-17 ENCOUNTER — OFFICE VISIT (OUTPATIENT)
Dept: CARDIOLOGY | Facility: MEDICAL CENTER | Age: 74
End: 2019-12-17
Payer: MEDICARE

## 2019-12-17 VITALS
OXYGEN SATURATION: 96 % | SYSTOLIC BLOOD PRESSURE: 126 MMHG | HEIGHT: 61 IN | WEIGHT: 129 LBS | DIASTOLIC BLOOD PRESSURE: 72 MMHG | BODY MASS INDEX: 24.35 KG/M2 | HEART RATE: 58 BPM

## 2019-12-17 DIAGNOSIS — Z51.81 ENCOUNTER FOR MONITORING FLECAINIDE THERAPY: ICD-10-CM

## 2019-12-17 DIAGNOSIS — I47.10 PSVT (PAROXYSMAL SUPRAVENTRICULAR TACHYCARDIA): Primary | ICD-10-CM

## 2019-12-17 DIAGNOSIS — I25.10 CORONARY ARTERY DISEASE INVOLVING NATIVE CORONARY ARTERY OF NATIVE HEART WITHOUT ANGINA PECTORIS: Chronic | ICD-10-CM

## 2019-12-17 DIAGNOSIS — E78.00 HYPERCHOLESTEREMIA: Chronic | ICD-10-CM

## 2019-12-17 DIAGNOSIS — Z79.899 ENCOUNTER FOR MONITORING FLECAINIDE THERAPY: ICD-10-CM

## 2019-12-17 PROCEDURE — 99214 OFFICE O/P EST MOD 30 MIN: CPT | Performed by: INTERNAL MEDICINE

## 2019-12-17 PROCEDURE — 93000 ELECTROCARDIOGRAM COMPLETE: CPT | Performed by: INTERNAL MEDICINE

## 2019-12-17 NOTE — PROGRESS NOTES
"Arrhythmia Clinic Note (Established patient)    DOS: 12/17/2019    Chief complaint/Reason for consult: AT    Interval History:  Patient is a 75 yo F. History of symptomatic AT. On flecainide. Here for follow-up. Recently back from [place]. Weather was not great there. Otherwise doing well. No recurrent palpitations. Likes the flecainide, she says making a big difference for her.    ROS (+ highlighted in red):  General--Negative for fatigue, weight loss or weight gain  Cardiovascular--Negative for CP, orthopnea, PND    Past Medical History:   Diagnosis Date   • Anesthesia     \"couldn't open eye or talk\"   • Arrhythmia     PSVT   • CAD (coronary artery disease) 11/10/2010   • Dizziness 2/2/2010   • Heart burn    • High cholesterol    • Hypercholesteremia 11/10/2010   • Indigestion    • Murmur 11/10/2010   • Palpitations 11/10/2010   • Paroxysmal supraventricular tachycardia (HCC) 11/10/2010   • Unspecified cataract     IOL OD       Past Surgical History:   Procedure Laterality Date   • KNEE ARTHROSCOPY Left 7/6/2017    Procedure: KNEE ARTHROSCOPY;  Surgeon: Al Heredia M.D.;  Location: [facility];  Service:    • MEDIAL MENISCECTOMY Left 7/6/2017    Procedure: MEDIAL MENISCECTOMY - PARTIAL, MUIR;  Surgeon: Al Heredia M.D.;  Location: [facility];  Service:    • CATARACT PHACO WITH IOL Right 6/22/2015    Procedure: CATARACT PHACO WITH IOL;  Surgeon: David Llanos M.D.;  Location: [facility];  Service:    • ABDOMINOPLASTY  2015    and face lift   • INGUINAL HERNIA REPAIR Right 2014   • CARPAL TUNNEL RELEASE  2012   • SALPINGOSTOMY Bilateral 1975    repair of tubes   • PRIMARY C SECTION  1977, 1985       Social History     Socioeconomic History   • Marital status:      Spouse name: Not on file   • Number of children: Not on file   • Years of education: Not on file   • Highest education level: Not on file   Occupational History   • Occupation: School " teacher     Employer: OTHER   Social Needs   • Financial resource strain: Not on file   • Food insecurity:     Worry: Not on file     Inability: Not on file   • Transportation needs:     Medical: Not on file     Non-medical: Not on file   Tobacco Use   • Smoking status: Never Smoker   • Smokeless tobacco: Never Used   Substance and Sexual Activity   • Alcohol use: Yes     Comment: Occasionally   • Drug use: No   • Sexual activity: Not on file   Lifestyle   • Physical activity:     Days per week: Not on file     Minutes per session: Not on file   • Stress: Not on file   Relationships   • Social connections:     Talks on phone: Not on file     Gets together: Not on file     Attends Spiritism service: Not on file     Active member of club or organization: Not on file     Attends meetings of clubs or organizations: Not on file     Relationship status: Not on file   • Intimate partner violence:     Fear of current or ex partner: Not on file     Emotionally abused: Not on file     Physically abused: Not on file     Forced sexual activity: Not on file   Other Topics Concern   • Not on file   Social History Narrative   • Not on file       Family History   Problem Relation Age of Onset   • Cancer Mother          at age 60; breast cancer   • Heart Attack Father          at age 83 of heart attack.   • Heart Attack Brother         heart attack at age 41 and bypass surgery   • Heart Attack Brother         bypass surgery at age 62   • Cancer Maternal Aunt        No Known Allergies    Current Outpatient Medications   Medication Sig Dispense Refill   • simvastatin (ZOCOR) 40 MG Tab TAKE ONE TABLET BY MOUTH AT BEDTIME 100 Tab 3   • flecainide (TAMBOCOR) 50 MG tablet Take 1 Tab by mouth 2 times a day. 180 Tab 3   • metoprolol SR (TOPROL XL) 25 MG TABLET SR 24 HR Take 1 Tab by mouth 2 Times a Day. (Patient taking differently: Take 25 mg by mouth every day.) 180 Tab 3   • pantoprazole (PROTONIX) 40 MG Tablet Delayed Response  "Take 40 mg by mouth every day.     • Calcium-Magnesium-Vitamin D (CALCIUM 500 PO) Take 2 Tabs by mouth every day.     • aspirin 81 MG tablet Take 81 mg by mouth every evening.       No current facility-administered medications for this visit.        Physical Exam:  Vitals:    12/17/19 1404   BP: 126/72   BP Location: Left arm   Patient Position: Sitting   BP Cuff Size: Adult   Pulse: (!) 58   SpO2: 96%   Weight: 58.5 kg (129 lb)   Height: 1.549 m (5' 1\")     General appearance: NAD, conversant  HEENT: PERRL, neck is supple with FROM  Lungs: Clear to auscultation, normal respiratory effort  CV: RRR, no murmurs/rubs/gallops, no JVD  Abdomen: Soft, non-tender with normal bowel sounds  Extremities: No peripheral edema, no clubbing or cyanosis  Skin: No rash, lesions, or ulcers  Psych: Alert and oriented to person, place and time    Data:  Labs reviewed    Prior echo/stress reviewed:  Normal echo    EKG interpreted by me:  Sinus, QRS looks good    Impression/Plan:  1. PSVT (paroxysmal supraventricular tachycardia) (HCC)  EKG   2. Encounter for monitoring flecainide therapy     3. Coronary artery disease involving native coronary artery of native heart without angina pectoris     4. Hypercholesteremia       -Doing fine on the flecainide  -Continue IC agent, QRS looks good  -Her CAD is non-occlusive and I do not think contraindication  -Continue low dose ASA/statin for this  -F/u in 12 mo    Eugene Vela MD    "

## 2019-12-31 LAB — EKG IMPRESSION: NORMAL

## 2020-01-22 ENCOUNTER — HOSPITAL ENCOUNTER (OUTPATIENT)
Dept: RADIOLOGY | Facility: MEDICAL CENTER | Age: 75
End: 2020-01-22
Attending: FAMILY MEDICINE
Payer: MEDICARE

## 2020-01-22 DIAGNOSIS — Z12.31 VISIT FOR SCREENING MAMMOGRAM: ICD-10-CM

## 2020-01-22 PROCEDURE — 77067 SCR MAMMO BI INCL CAD: CPT

## 2020-01-27 ENCOUNTER — HOSPITAL ENCOUNTER (OUTPATIENT)
Dept: RADIOLOGY | Facility: MEDICAL CENTER | Age: 75
End: 2020-01-27
Attending: FAMILY MEDICINE
Payer: MEDICARE

## 2020-01-27 DIAGNOSIS — R92.8 ABNORMAL MAMMOGRAM: ICD-10-CM

## 2020-01-27 PROCEDURE — 76642 ULTRASOUND BREAST LIMITED: CPT | Mod: RT

## 2020-01-27 PROCEDURE — G0279 TOMOSYNTHESIS, MAMMO: HCPCS | Mod: RT

## 2020-03-18 DIAGNOSIS — I47.19 PAT (PAROXYSMAL ATRIAL TACHYCARDIA) (HCC): ICD-10-CM

## 2020-03-22 RX ORDER — FLECAINIDE ACETATE 50 MG/1
TABLET ORAL
Qty: 180 TAB | Refills: 2 | Status: SHIPPED | OUTPATIENT
Start: 2020-03-22 | End: 2020-12-30

## 2020-06-24 ENCOUNTER — HOSPITAL ENCOUNTER (OUTPATIENT)
Dept: LAB | Facility: MEDICAL CENTER | Age: 75
End: 2020-06-24
Attending: PHYSICIAN ASSISTANT
Payer: MEDICARE

## 2020-06-24 LAB
ALBUMIN SERPL BCP-MCNC: 3.9 G/DL (ref 3.2–4.9)
ALBUMIN/GLOB SERPL: 1.6 G/DL
ALP SERPL-CCNC: 68 U/L (ref 30–99)
ALT SERPL-CCNC: 12 U/L (ref 2–50)
ANION GAP SERPL CALC-SCNC: 12 MMOL/L (ref 7–16)
AST SERPL-CCNC: 12 U/L (ref 12–45)
BASOPHILS # BLD AUTO: 0.9 % (ref 0–1.8)
BASOPHILS # BLD: 0.07 K/UL (ref 0–0.12)
BILIRUB SERPL-MCNC: 0.3 MG/DL (ref 0.1–1.5)
BUN SERPL-MCNC: 21 MG/DL (ref 8–22)
CALCIUM SERPL-MCNC: 9.4 MG/DL (ref 8.5–10.5)
CHLORIDE SERPL-SCNC: 100 MMOL/L (ref 96–112)
CHOLEST SERPL-MCNC: 209 MG/DL (ref 100–199)
CO2 SERPL-SCNC: 25 MMOL/L (ref 20–33)
CREAT SERPL-MCNC: 0.67 MG/DL (ref 0.5–1.4)
EOSINOPHIL # BLD AUTO: 0.2 K/UL (ref 0–0.51)
EOSINOPHIL NFR BLD: 2.7 % (ref 0–6.9)
ERYTHROCYTE [DISTWIDTH] IN BLOOD BY AUTOMATED COUNT: 44.6 FL (ref 35.9–50)
EST. AVERAGE GLUCOSE BLD GHB EST-MCNC: 120 MG/DL
FASTING STATUS PATIENT QL REPORTED: NORMAL
GLOBULIN SER CALC-MCNC: 2.5 G/DL (ref 1.9–3.5)
GLUCOSE SERPL-MCNC: 109 MG/DL (ref 65–99)
HBA1C MFR BLD: 5.8 % (ref 0–5.6)
HCT VFR BLD AUTO: 42.1 % (ref 37–47)
HDLC SERPL-MCNC: 49 MG/DL
HGB BLD-MCNC: 14 G/DL (ref 12–16)
IMM GRANULOCYTES # BLD AUTO: 0.01 K/UL (ref 0–0.11)
IMM GRANULOCYTES NFR BLD AUTO: 0.1 % (ref 0–0.9)
LDLC SERPL CALC-MCNC: 119 MG/DL
LYMPHOCYTES # BLD AUTO: 2.5 K/UL (ref 1–4.8)
LYMPHOCYTES NFR BLD: 33.4 % (ref 22–41)
MCH RBC QN AUTO: 31.2 PG (ref 27–33)
MCHC RBC AUTO-ENTMCNC: 33.3 G/DL (ref 33.6–35)
MCV RBC AUTO: 93.8 FL (ref 81.4–97.8)
MONOCYTES # BLD AUTO: 0.63 K/UL (ref 0–0.85)
MONOCYTES NFR BLD AUTO: 8.4 % (ref 0–13.4)
NEUTROPHILS # BLD AUTO: 4.08 K/UL (ref 2–7.15)
NEUTROPHILS NFR BLD: 54.5 % (ref 44–72)
NRBC # BLD AUTO: 0 K/UL
NRBC BLD-RTO: 0 /100 WBC
PLATELET # BLD AUTO: 274 K/UL (ref 164–446)
PMV BLD AUTO: 9.8 FL (ref 9–12.9)
POTASSIUM SERPL-SCNC: 4.4 MMOL/L (ref 3.6–5.5)
PROT SERPL-MCNC: 6.4 G/DL (ref 6–8.2)
RBC # BLD AUTO: 4.49 M/UL (ref 4.2–5.4)
SODIUM SERPL-SCNC: 137 MMOL/L (ref 135–145)
TRIGL SERPL-MCNC: 204 MG/DL (ref 0–149)
WBC # BLD AUTO: 7.5 K/UL (ref 4.8–10.8)

## 2020-06-24 PROCEDURE — 36415 COLL VENOUS BLD VENIPUNCTURE: CPT

## 2020-06-24 PROCEDURE — 83036 HEMOGLOBIN GLYCOSYLATED A1C: CPT

## 2020-06-24 PROCEDURE — 80061 LIPID PANEL: CPT

## 2020-06-24 PROCEDURE — 80053 COMPREHEN METABOLIC PANEL: CPT

## 2020-06-24 PROCEDURE — 85025 COMPLETE CBC W/AUTO DIFF WBC: CPT

## 2020-06-26 DIAGNOSIS — E78.00 HYPERCHOLESTEREMIA: ICD-10-CM

## 2020-06-26 RX ORDER — SIMVASTATIN 40 MG
TABLET ORAL
Qty: 100 TAB | Refills: 2 | Status: SHIPPED | OUTPATIENT
Start: 2020-06-26 | End: 2020-11-20 | Stop reason: SDUPTHER

## 2020-11-13 ENCOUNTER — TELEPHONE (OUTPATIENT)
Dept: CARDIOLOGY | Facility: MEDICAL CENTER | Age: 75
End: 2020-11-13

## 2020-11-13 NOTE — TELEPHONE ENCOUNTER
SS    Hello, patient thinks she has a heart block, she can not walk upstairs without running out of air she needs to hold on to a rail, patient is worried and will like a call back at [phone]      Thank you!

## 2020-11-13 NOTE — TELEPHONE ENCOUNTER
"Pt hanging on to raling up stairs and feels like she \"has a blockage\"  Has gotten worse of the last few months.   Denies weight gain, swelling  Denies CP \"twitches only\"   Denies light headedness..     Advised to go to ER if has inc CP, non resolving SOB. Made appt with NICK GODOY first available next week   "

## 2020-11-20 ENCOUNTER — OFFICE VISIT (OUTPATIENT)
Dept: CARDIOLOGY | Facility: MEDICAL CENTER | Age: 75
End: 2020-11-20
Payer: MEDICARE

## 2020-11-20 VITALS
BODY MASS INDEX: 23.92 KG/M2 | SYSTOLIC BLOOD PRESSURE: 122 MMHG | OXYGEN SATURATION: 98 % | HEART RATE: 58 BPM | HEIGHT: 61 IN | DIASTOLIC BLOOD PRESSURE: 72 MMHG | WEIGHT: 126.7 LBS | RESPIRATION RATE: 12 BRPM

## 2020-11-20 DIAGNOSIS — R00.2 PALPITATIONS: Chronic | ICD-10-CM

## 2020-11-20 DIAGNOSIS — I47.19 PAT (PAROXYSMAL ATRIAL TACHYCARDIA) (HCC): ICD-10-CM

## 2020-11-20 DIAGNOSIS — I25.10 CORONARY ARTERY DISEASE INVOLVING NATIVE CORONARY ARTERY OF NATIVE HEART WITHOUT ANGINA PECTORIS: Chronic | ICD-10-CM

## 2020-11-20 DIAGNOSIS — I47.10 PAROXYSMAL SUPRAVENTRICULAR TACHYCARDIA (HCC): Chronic | ICD-10-CM

## 2020-11-20 DIAGNOSIS — E78.00 HYPERCHOLESTEREMIA: Chronic | ICD-10-CM

## 2020-11-20 DIAGNOSIS — R01.1 MURMUR: Chronic | ICD-10-CM

## 2020-11-20 PROCEDURE — 93000 ELECTROCARDIOGRAM COMPLETE: CPT | Performed by: INTERNAL MEDICINE

## 2020-11-20 PROCEDURE — 99214 OFFICE O/P EST MOD 30 MIN: CPT | Performed by: NURSE PRACTITIONER

## 2020-11-20 RX ORDER — LIFITEGRAST 50 MG/ML
SOLUTION/ DROPS OPHTHALMIC
COMMUNITY
Start: 2020-10-02 | End: 2020-11-20

## 2020-11-20 RX ORDER — SIMVASTATIN 40 MG
TABLET ORAL
Qty: 100 TAB | Refills: 3 | Status: SHIPPED | OUTPATIENT
Start: 2020-11-20 | End: 2022-03-07

## 2020-11-20 RX ORDER — METOPROLOL SUCCINATE 25 MG/1
25 TABLET, EXTENDED RELEASE ORAL DAILY
Qty: 100 TAB | Refills: 3 | Status: SHIPPED
Start: 2020-11-20 | End: 2021-08-31

## 2020-11-20 RX ORDER — METHYLPREDNISOLONE 4 MG/1
TABLET ORAL
COMMUNITY
Start: 2020-09-03 | End: 2020-11-20

## 2020-11-20 RX ORDER — TIZANIDINE 4 MG/1
TABLET ORAL
COMMUNITY
Start: 2020-09-03 | End: 2020-11-20

## 2020-11-20 RX ORDER — METOPROLOL SUCCINATE 25 MG/1
25 TABLET, EXTENDED RELEASE ORAL DAILY
Qty: 90 TAB | Refills: 3 | Status: SHIPPED | OUTPATIENT
Start: 2020-11-20 | End: 2020-11-20 | Stop reason: SDUPTHER

## 2020-11-20 ASSESSMENT — ENCOUNTER SYMPTOMS
CLAUDICATION: 0
VOMITING: 0
NAUSEA: 0
SPUTUM PRODUCTION: 0
PALPITATIONS: 0
HEADACHES: 0
CHILLS: 0
COUGH: 0
SHORTNESS OF BREATH: 0
HEMOPTYSIS: 0
PND: 0
FEVER: 0
ORTHOPNEA: 0
WHEEZING: 0
DIZZINESS: 0

## 2020-11-20 ASSESSMENT — FIBROSIS 4 INDEX: FIB4 SCORE: 0.95

## 2020-11-20 NOTE — PROGRESS NOTES
"Chief Complaint   Patient presents with   • Coronary Artery Disease     PP of SS       Subjective:   LYNDSEY Solis is a 75 y.o. female who presents today  for follow-up evaluation of CAD, palpitations, PAT, CAD, mild aortic regurgitation with nonsustained VT and hyperlipidemia.  Patient is followed by Dr. Vela for EP with the last visit 12/17/2019 and Dr. Oneill for general cardiology and was last seen by him 2/13/2019.    Since then, patient had lipid panel 6/24/2020 indicating , , HDL 49, .  She reports that since July she has been having an increase in shortness of breath with exertion.  She is going up a half flight of stairs she'll have to hold on and experiences dyspnea.  She also reports having an intermittent substernal heaviness that happens at rest and dissipates on its own.  ECG today indicates sinus bradycardia with PACs.     Last seen on 12/28/2018 at [facility] by Dr. Eugene Vela for recurrent PAT.  Flecainide was added to metoprolol..     Since 12/2018 hospital discharge the patient has had no cardiac symptoms.  Feels flecainide is like a \"miracle\".  In January she took a trip to [facility] and developed respiratory congestion ultimately returning to be diagnosed with pneumonia started on antibiotics.  Planning a one-month trip to the couple's in [place].     Past medical history   Was seen by Dr. Vela for symptomatic palpitations related to what he felt was PAT.  Metoprolol dose was doubled.  The patient also cut back on caffeine.  Nonetheless the patient continues to have daily sometimes frequent palpitations which are bothersome.  No syncope.  Noninvasive evaluation was normal.     Past medical history  Having also supple daily palpitations with some lightheadedness since September.  Holter monitor showed supraventricular tachycardia and one episode of nonsustained ventricular tachycardia.  No angina pectoris.  Has continued on metoprolol and simvastatin and aspirin.    Past " "Medical History:   Diagnosis Date   • Anesthesia     \"couldn't open eye or talk\"   • Arrhythmia     PSVT   • CAD (coronary artery disease) 11/10/2010   • Dizziness 2010   • Heart burn    • High cholesterol    • Hypercholesteremia 11/10/2010   • Indigestion    • Murmur 11/10/2010   • Palpitations 11/10/2010   • Paroxysmal supraventricular tachycardia (HCC) 11/10/2010   • Unspecified cataract     IOL OD     Past Surgical History:   Procedure Laterality Date   • KNEE ARTHROSCOPY Left 2017    Procedure: KNEE ARTHROSCOPY;  Surgeon: Al Heredia M.D.;  Location: SURGERY [facility];  Service:    • MEDIAL MENISCECTOMY Left 2017    Procedure: MEDIAL MENISCECTOMY - PARTIAL, MUIR;  Surgeon: Al Heredia M.D.;  Location: SURGERY [facility];  Service:    • CATARACT PHACO WITH IOL Right 2015    Procedure: CATARACT PHACO WITH IOL;  Surgeon: David Llanos M.D.;  Location: SURGERY [facility];  Service:    • ABDOMINOPLASTY      and face lift   • INGUINAL HERNIA REPAIR Right    • CARPAL TUNNEL RELEASE     • SALPINGOSTOMY Bilateral 1975    repair of tubes   • PRIMARY C SECTION  ,      Family History   Problem Relation Age of Onset   • Cancer Mother          at age 60; breast cancer   • Heart Attack Father          at age 83 of heart attack.   • Heart Attack Brother         heart attack at age 41 and bypass surgery   • Heart Attack Brother         bypass surgery at age 62   • Cancer Maternal Aunt      Social History     Socioeconomic History   • Marital status:      Spouse name: Not on file   • Number of children: Not on file   • Years of education: Not on file   • Highest education level: Not on file   Occupational History   • Occupation:      Employer: OTHER   Social Needs   • Financial resource strain: Not on file   • Food insecurity     Worry: Not on file     Inability: Not on file   • Transportation needs     Medical: Not on file     " Non-medical: Not on file   Tobacco Use   • Smoking status: Never Smoker   • Smokeless tobacco: Never Used   Substance and Sexual Activity   • Alcohol use: Yes     Comment: Occasionally   • Drug use: No   • Sexual activity: Not on file   Lifestyle   • Physical activity     Days per week: Not on file     Minutes per session: Not on file   • Stress: Not on file   Relationships   • Social connections     Talks on phone: Not on file     Gets together: Not on file     Attends Synagogue service: Not on file     Active member of club or organization: Not on file     Attends meetings of clubs or organizations: Not on file     Relationship status: Not on file   • Intimate partner violence     Fear of current or ex partner: Not on file     Emotionally abused: Not on file     Physically abused: Not on file     Forced sexual activity: Not on file   Other Topics Concern   • Not on file   Social History Narrative   • Not on file     No Known Allergies  Outpatient Encounter Medications as of 11/20/2020   Medication Sig Dispense Refill   • flecainide (TAMBOCOR) 50 MG tablet TAKE ONE TABLET BY MOUTH TWICE A  Tab 2   • metoprolol SR (TOPROL XL) 25 MG TABLET SR 24 HR Take 1 Tab by mouth 2 Times a Day. (Patient taking differently: Take 25 mg by mouth every day.) 180 Tab 3   • pantoprazole (PROTONIX) 40 MG Tablet Delayed Response Take 40 mg by mouth every day.     • Calcium-Magnesium-Vitamin D (CALCIUM 500 PO) Take 2 Tabs by mouth every day.     • aspirin 81 MG tablet Take 81 mg by mouth every evening.     • XIIDRA 5 % Solution      • methylPREDNISolone (MEDROL DOSEPAK) 4 MG Tablet Therapy Pack      • tizanidine (ZANAFLEX) 4 MG Tab      • simvastatin (ZOCOR) 40 MG Tab TAKE ONE TABLET BY MOUTH AT BEDTIME (Patient not taking: Reported on 11/20/2020) 100 Tab 2     No facility-administered encounter medications on file as of 11/20/2020.      Review of Systems   Constitutional: Negative for chills and fever.   Respiratory: Negative for  "cough, hemoptysis, sputum production, shortness of breath and wheezing.    Cardiovascular: Negative for chest pain, palpitations, orthopnea, claudication, leg swelling and PND.   Gastrointestinal: Negative for nausea and vomiting.   Neurological: Negative for dizziness and headaches.   All other systems reviewed and are negative.       Objective:   Ht 1.549 m (5' 1\")   Wt 57.5 kg (126 lb 11.2 oz)   BMI 23.94 kg/m²     Physical Exam   Constitutional: She appears well-developed and well-nourished.   Eyes: EOM are normal.   Neck: Neck supple. No JVD present.   Cardiovascular: Normal rate, regular rhythm and normal heart sounds.   No murmur heard.  Pulmonary/Chest: Effort normal and breath sounds normal.   Abdominal: Soft.   Neurological:   Cranial nerves II-XII WNL   Skin: Skin is warm and dry.   Psychiatric: She has a normal mood and affect. Her behavior is normal. Judgment and thought content normal.   Nursing note and vitals reviewed.    ECHOCARDIOGRAM 11/29/2010  EF 84%. Mild LVH. 1-2+ AR. 1+ MR. PSP 22-27 mmHg.     ECHOCARDIOGRAM 01/05/2017  Normal left ventricular systolic function.  Moderate concentric left ventricular hypertrophy.  Left ventricular ejection fraction is visually estimated to be 70%.  Aortic sclerosis without stenosis.  Mild aortic insufficiency.  Right heart pressures are normal.  No prior study is available for comparison.     ECHOCARDIOGRAM 12/28/2018  Mild concentric left ventricular hypertrophy.  Left ventricular ejection fraction is visually estimated to be 70%.  Normal regional wall motion.  Grade II diastolic dysfunction.  Mild aortic insufficiency.  Mild mitral regurgitation..  Estimated right ventricular systolic pressure  is 40  mmHg.     MPI 01/05/2017  Normal myocardial perfusion with no ischemia.   Normal left ventricular wall motion.  LV ejection fraction = 73%.   ECG INTERPRETATION   Negative stress ECG for ischemia.     01/23/2003 CARDIAC CATHETERIZATION  EF 67%.  50-70% " diagonal stenosis.  Treated medically.     11/02/2016 HOLTER MONITOR  Normal sinus rhythm.  Multiple episodes of SVT.  Nonsustained VT.       Assessment:     1. Coronary artery disease involving native coronary artery of native heart without angina pectoris     2. Hypercholesteremia     3. Murmur     4. Palpitations     5. Paroxysmal supraventricular tachycardia (HCC)     6. PAT (paroxysmal atrial tachycardia) (HCC)         Medical Decision Making:  Today's Assessment / Status / Plan:   ECG today, sinus bradycardia with PACs  Chemical MPI for chest heaviness and increasing shortness of breath      Follow-up visit with results of her MPI.  Patient may require cardiac catheterization.    Please note that this dictation was created using voice recognition software.  I have made every reasonable attempt to correct obvious errors, but it is possible there are errors of grammar or possibly content that I did not discover before finalizing the note.

## 2020-11-21 LAB — EKG IMPRESSION: NORMAL

## 2020-11-30 ENCOUNTER — HOSPITAL ENCOUNTER (OUTPATIENT)
Dept: RADIOLOGY | Facility: MEDICAL CENTER | Age: 75
End: 2020-11-30
Attending: NURSE PRACTITIONER
Payer: MEDICARE

## 2020-11-30 DIAGNOSIS — I25.10 CORONARY ARTERY DISEASE INVOLVING NATIVE CORONARY ARTERY OF NATIVE HEART WITHOUT ANGINA PECTORIS: Chronic | ICD-10-CM

## 2020-11-30 PROCEDURE — 78452 HT MUSCLE IMAGE SPECT MULT: CPT | Mod: 26 | Performed by: INTERNAL MEDICINE

## 2020-11-30 PROCEDURE — 93018 CV STRESS TEST I&R ONLY: CPT | Performed by: INTERNAL MEDICINE

## 2020-11-30 PROCEDURE — A9502 TC99M TETROFOSMIN: HCPCS

## 2020-11-30 PROCEDURE — 700111 HCHG RX REV CODE 636 W/ 250 OVERRIDE (IP)

## 2020-11-30 RX ORDER — REGADENOSON 0.08 MG/ML
0.4 INJECTION, SOLUTION INTRAVENOUS ONCE
Status: COMPLETED | OUTPATIENT
Start: 2020-11-30 | End: 2020-11-30

## 2020-11-30 RX ORDER — REGADENOSON 0.08 MG/ML
INJECTION, SOLUTION INTRAVENOUS
Status: COMPLETED
Start: 2020-11-30 | End: 2020-11-30

## 2020-11-30 RX ADMIN — REGADENOSON 0.4 MG: 0.08 INJECTION, SOLUTION INTRAVENOUS at 09:28

## 2020-12-02 ENCOUNTER — OFFICE VISIT (OUTPATIENT)
Dept: CARDIOLOGY | Facility: MEDICAL CENTER | Age: 75
End: 2020-12-02
Payer: MEDICARE

## 2020-12-02 VITALS
DIASTOLIC BLOOD PRESSURE: 64 MMHG | OXYGEN SATURATION: 99 % | SYSTOLIC BLOOD PRESSURE: 128 MMHG | BODY MASS INDEX: 23.79 KG/M2 | HEIGHT: 61 IN | WEIGHT: 126 LBS | HEART RATE: 52 BPM

## 2020-12-02 DIAGNOSIS — E78.00 HYPERCHOLESTEREMIA: Chronic | ICD-10-CM

## 2020-12-02 DIAGNOSIS — I47.10 PAROXYSMAL SUPRAVENTRICULAR TACHYCARDIA (HCC): Chronic | ICD-10-CM

## 2020-12-02 DIAGNOSIS — R00.2 PALPITATIONS: Chronic | ICD-10-CM

## 2020-12-02 DIAGNOSIS — I25.10 CORONARY ARTERY DISEASE INVOLVING NATIVE CORONARY ARTERY OF NATIVE HEART WITHOUT ANGINA PECTORIS: Chronic | ICD-10-CM

## 2020-12-02 DIAGNOSIS — Z79.899 LONG TERM CURRENT USE OF ANTIARRHYTHMIC MEDICAL THERAPY: ICD-10-CM

## 2020-12-02 DIAGNOSIS — R06.02 SOB (SHORTNESS OF BREATH): ICD-10-CM

## 2020-12-02 PROCEDURE — 99214 OFFICE O/P EST MOD 30 MIN: CPT | Performed by: NURSE PRACTITIONER

## 2020-12-02 ASSESSMENT — ENCOUNTER SYMPTOMS
PND: 0
CHILLS: 0
PALPITATIONS: 0
SHORTNESS OF BREATH: 0
WHEEZING: 0
DIZZINESS: 0
NAUSEA: 0
CLAUDICATION: 0
FEVER: 0
VOMITING: 0
HEMOPTYSIS: 0
SPUTUM PRODUCTION: 0
ORTHOPNEA: 0
COUGH: 0
HEADACHES: 0

## 2020-12-02 ASSESSMENT — FIBROSIS 4 INDEX: FIB4 SCORE: 0.95

## 2020-12-02 NOTE — PROGRESS NOTES
"Chief Complaint   Patient presents with   • Coronary Artery Disease       Subjective:   LYNDSEY Solis is a 75 y.o. female who presents today for follow-up evaluation of CAD, palpitations, PAT, CAD, mild aortic regurgitation with nonsustained VT and hyperlipidemia.  Patient is followed by Dr. Vela for EP with the last visit 12/17/2019 and Dr. Oneill for general cardiology and was last seen by him 2/13/2019.  I last saw the patient 11/20/2020 for new increasing shortness of breath.    Since then the patient has had a nuclear stress test which was completely normal.  She continues to have shortness of breath while climbing stairs.  She does not report significant shortness of breath with exertion.  She denies chest pain, palpitations, orthopnea, PND, and lower extremity edema.     Since then, patient had lipid panel 6/24/2020 indicating , , HDL 49, .  She reports that since July she has been having an increase in shortness of breath with exertion.  She is going up a half flight of stairs she'll have to hold on and experiences dyspnea.  She also reports having an intermittent substernal heaviness that happens at rest and dissipates on its own.  ECG today indicates sinus bradycardia with PACs.     Last seen on 12/28/2018 at [facility] by Dr. Eugene Vela for recurrent PAT.  Flecainide was added to metoprolol..     Since 12/2018 hospital discharge the patient has had no cardiac symptoms.  Feels flecainide is like a \"miracle\".  In January she took a trip to [facility] and developed respiratory congestion ultimately returning to be diagnosed with pneumonia started on antibiotics.  Planning a one-month trip to the couple's in [place].     Past medical history   Was seen by Dr. Vela for symptomatic palpitations related to what he felt was PAT.  Metoprolol dose was doubled.  The patient also cut back on caffeine.  Nonetheless the patient continues to have daily sometimes frequent palpitations which are " "bothersome.  No syncope.  Noninvasive evaluation was normal.     Past medical history  Having also supple daily palpitations with some lightheadedness since September.  Holter monitor showed supraventricular tachycardia and one episode of nonsustained ventricular tachycardia.  No angina pectoris.  Has continued on metoprolol and simvastatin and aspirin.    Past Medical History:   Diagnosis Date   • Anesthesia     \"couldn't open eye or talk\"   • Arrhythmia     PSVT   • CAD (coronary artery disease) 11/10/2010   • Dizziness 2010   • Heart burn    • High cholesterol    • Hypercholesteremia 11/10/2010   • Indigestion    • Murmur 11/10/2010   • Palpitations 11/10/2010   • Paroxysmal supraventricular tachycardia (HCC) 11/10/2010   • Unspecified cataract     IOL OD     Past Surgical History:   Procedure Laterality Date   • KNEE ARTHROSCOPY Left 2017    Procedure: KNEE ARTHROSCOPY;  Surgeon: Al Heredia M.D.;  Location: [facility];  Service:    • MEDIAL MENISCECTOMY Left 2017    Procedure: MEDIAL MENISCECTOMY - PARTIAL, MUIR;  Surgeon: Al Heredia M.D.;  Location: [facility];  Service:    • CATARACT PHACO WITH IOL Right 2015    Procedure: CATARACT PHACO WITH IOL;  Surgeon: David Llanos M.D.;  Location: [facility];  Service:    • ABDOMINOPLASTY      and face lift   • INGUINAL HERNIA REPAIR Right    • CARPAL TUNNEL RELEASE     • SALPINGOSTOMY Bilateral 1975    repair of tubes   • PRIMARY C SECTION  ,      Family History   Problem Relation Age of Onset   • Cancer Mother          at age 60; breast cancer   • Heart Attack Father          at age 83 of heart attack.   • Heart Attack Brother         heart attack at age 41 and bypass surgery   • Heart Attack Brother         bypass surgery at age 62   • Cancer Maternal Aunt      Social History     Socioeconomic History   • Marital status:      Spouse name: Not on file   • " Number of children: Not on file   • Years of education: Not on file   • Highest education level: Not on file   Occupational History   • Occupation:      Employer: OTHER   Social Needs   • Financial resource strain: Not on file   • Food insecurity     Worry: Not on file     Inability: Not on file   • Transportation needs     Medical: Not on file     Non-medical: Not on file   Tobacco Use   • Smoking status: Never Smoker   • Smokeless tobacco: Never Used   Substance and Sexual Activity   • Alcohol use: Yes     Comment: Occasionally   • Drug use: No   • Sexual activity: Not on file   Lifestyle   • Physical activity     Days per week: Not on file     Minutes per session: Not on file   • Stress: Not on file   Relationships   • Social connections     Talks on phone: Not on file     Gets together: Not on file     Attends Yarsani service: Not on file     Active member of club or organization: Not on file     Attends meetings of clubs or organizations: Not on file     Relationship status: Not on file   • Intimate partner violence     Fear of current or ex partner: Not on file     Emotionally abused: Not on file     Physically abused: Not on file     Forced sexual activity: Not on file   Other Topics Concern   • Not on file   Social History Narrative   • Not on file     No Known Allergies  Outpatient Encounter Medications as of 12/2/2020   Medication Sig Dispense Refill   • simvastatin (ZOCOR) 40 MG Tab TAKE ONE TABLET BY MOUTH AT BEDTIME 100 Tab 3   • metoprolol SR (TOPROL XL) 25 MG TABLET SR 24 HR Take 1 Tab by mouth every day. 100 Tab 3   • flecainide (TAMBOCOR) 50 MG tablet TAKE ONE TABLET BY MOUTH TWICE A  Tab 2   • pantoprazole (PROTONIX) 40 MG Tablet Delayed Response Take 40 mg by mouth every day.     • Calcium-Magnesium-Vitamin D (CALCIUM 500 PO) Take 2 Tabs by mouth every day.     • aspirin 81 MG tablet Take 81 mg by mouth every evening.       No facility-administered encounter medications on  "file as of 12/2/2020.      Review of Systems   Constitutional: Negative for chills and fever.   Respiratory: Negative for cough, hemoptysis, sputum production, shortness of breath and wheezing.    Cardiovascular: Negative for chest pain, palpitations, orthopnea, claudication, leg swelling and PND.   Gastrointestinal: Negative for nausea and vomiting.   Neurological: Negative for dizziness and headaches.   All other systems reviewed and are negative.       Objective:   /64 (BP Location: Left arm, Patient Position: Sitting, BP Cuff Size: Adult)   Pulse (!) 52   Ht 1.549 m (5' 1\")   Wt 57.2 kg (126 lb)   SpO2 99%   BMI 23.81 kg/m²     Physical Exam   Constitutional: She appears well-developed and well-nourished.   Eyes: EOM are normal.   Neck: Neck supple. No JVD present.   Cardiovascular: Normal rate, regular rhythm and normal heart sounds.   No murmur heard.  Pulmonary/Chest: Effort normal and breath sounds normal.   Abdominal: Soft.   Neurological:   Cranial nerves II-XII WNL   Skin: Skin is warm and dry.   Psychiatric: She has a normal mood and affect. Her behavior is normal. Judgment and thought content normal.   Nursing note and vitals reviewed.    ECHOCARDIOGRAM 11/29/2010  EF 84%. Mild LVH. 1-2+ AR. 1+ MR. PSP 22-27 mmHg.     ECHOCARDIOGRAM 01/05/2017  Normal left ventricular systolic function.  Moderate concentric left ventricular hypertrophy.  Left ventricular ejection fraction is visually estimated to be 70%.  Aortic sclerosis without stenosis.  Mild aortic insufficiency.  Right heart pressures are normal.  No prior study is available for comparison.     ECHOCARDIOGRAM 12/28/2018  Mild concentric left ventricular hypertrophy.  Left ventricular ejection fraction is visually estimated to be 70%.  Normal regional wall motion.  Grade II diastolic dysfunction.  Mild aortic insufficiency.  Mild mitral regurgitation..  Estimated right ventricular systolic pressure  is 40  mmHg.    MPI " 11/30/2020   Myocardial Perfusion Report    NUCLEAR IMAGING INTERPRETATION    No evidence of significant jeopardized viable myocardium or prior myocardial    infarction.    Normal left ventricular size, ejection fraction, and wall motion.    ECG INTERPRETATION    Negative stress ECG for ischemia.      MPI 01/05/2017  Normal myocardial perfusion with no ischemia.   Normal left ventricular wall motion.  LV ejection fraction = 73%.   ECG INTERPRETATION   Negative stress ECG for ischemia.     01/23/2003 CARDIAC CATHETERIZATION  EF 67%.  50-70% diagonal stenosis.  Treated medically.       11/02/2016 HOLTER MONITOR  Normal sinus rhythm.  Multiple episodes of SVT.  Nonsustained VT.    Assessment:     1. Paroxysmal supraventricular tachycardia (HCC)     2. Palpitations     3. Hypercholesteremia     4. Coronary artery disease involving native coronary artery of native heart without angina pectoris     5. Long term current use of antiarrhythmic medical therapy         Medical Decision Making:  Today's Assessment / Status / Plan:   Continue current medications.  MPI is negative.  Recommend follow-up with primary care for shortness of breath associated with climbing stairs.  Will obtain chest x-ray and give results via [facility].  We will obtained echocardiogram as well.  Of note patient had cardiac catheterization 2003 with 50-70% diagonal stenosis.      Follow-up visit in 6 months with Dr Oneill.    Please note that this dictation was created using voice recognition software.  I have made every reasonable attempt to correct obvious errors, but it is possible there are errors of grammar or possibly content that I did not discover before finalizing the note.

## 2020-12-11 ENCOUNTER — HOSPITAL ENCOUNTER (OUTPATIENT)
Dept: CARDIOLOGY | Facility: MEDICAL CENTER | Age: 75
End: 2020-12-11
Attending: NURSE PRACTITIONER
Payer: MEDICARE

## 2020-12-11 ENCOUNTER — HOSPITAL ENCOUNTER (OUTPATIENT)
Dept: RADIOLOGY | Facility: MEDICAL CENTER | Age: 75
End: 2020-12-11
Attending: NURSE PRACTITIONER
Payer: MEDICARE

## 2020-12-11 DIAGNOSIS — I25.10 CORONARY ARTERY DISEASE INVOLVING NATIVE CORONARY ARTERY OF NATIVE HEART WITHOUT ANGINA PECTORIS: Chronic | ICD-10-CM

## 2020-12-11 DIAGNOSIS — R06.02 SOB (SHORTNESS OF BREATH): ICD-10-CM

## 2020-12-11 LAB
LV EJECT FRACT  99904: 70
LV EJECT FRACT MOD 2C 99903: 59.71
LV EJECT FRACT MOD 4C 99902: 72
LV EJECT FRACT MOD BP 99901: 66.86

## 2020-12-11 PROCEDURE — 71046 X-RAY EXAM CHEST 2 VIEWS: CPT

## 2020-12-11 PROCEDURE — 93306 TTE W/DOPPLER COMPLETE: CPT | Mod: 26 | Performed by: INTERNAL MEDICINE

## 2020-12-11 PROCEDURE — 93306 TTE W/DOPPLER COMPLETE: CPT

## 2020-12-18 ENCOUNTER — TELEPHONE (OUTPATIENT)
Dept: CARDIOLOGY | Facility: MEDICAL CENTER | Age: 75
End: 2020-12-18

## 2020-12-18 NOTE — TELEPHONE ENCOUNTER
Returned call. Pt cannot seem to access results now and just wanted to go over DB's interpretation/recommendations again. Pt will f/u w/ PCP as DB suggested.

## 2020-12-18 NOTE — TELEPHONE ENCOUNTER
TO: 2p/Fri/Ofc  NM: Charles Solis   PH: [phone]   PT NM: Charles Solis   : 45   REG DR: Dr Perales   RE: Please call would like to go over  the results of her Echo Cardiogram    DISP HIST: 2020 01:42P AA  P/Disp  2020 01:44P DLR checked msg

## 2020-12-28 ENCOUNTER — OFFICE VISIT (OUTPATIENT)
Dept: CARDIOLOGY | Facility: MEDICAL CENTER | Age: 75
End: 2020-12-28
Payer: MEDICARE

## 2020-12-28 VITALS
HEART RATE: 55 BPM | HEIGHT: 61 IN | BODY MASS INDEX: 23.79 KG/M2 | SYSTOLIC BLOOD PRESSURE: 116 MMHG | OXYGEN SATURATION: 97 % | WEIGHT: 126 LBS | DIASTOLIC BLOOD PRESSURE: 72 MMHG

## 2020-12-28 DIAGNOSIS — I51.89 DIASTOLIC DYSFUNCTION: ICD-10-CM

## 2020-12-28 DIAGNOSIS — I47.10 PSVT (PAROXYSMAL SUPRAVENTRICULAR TACHYCARDIA) (HCC): ICD-10-CM

## 2020-12-28 DIAGNOSIS — Z51.81 ENCOUNTER FOR MONITORING FLECAINIDE THERAPY: ICD-10-CM

## 2020-12-28 DIAGNOSIS — Z79.899 ENCOUNTER FOR MONITORING FLECAINIDE THERAPY: ICD-10-CM

## 2020-12-28 PROCEDURE — 99214 OFFICE O/P EST MOD 30 MIN: CPT | Performed by: INTERNAL MEDICINE

## 2020-12-28 PROCEDURE — 93000 ELECTROCARDIOGRAM COMPLETE: CPT | Performed by: INTERNAL MEDICINE

## 2020-12-28 RX ORDER — SPIRONOLACTONE 25 MG/1
25 TABLET ORAL DAILY
Qty: 30 TAB | Refills: 3 | Status: SHIPPED | OUTPATIENT
Start: 2020-12-28 | End: 2021-04-29

## 2020-12-28 ASSESSMENT — FIBROSIS 4 INDEX: FIB4 SCORE: 0.95

## 2020-12-28 NOTE — PROGRESS NOTES
"Arrhythmia Clinic Note (Established patient)    DOS: 12/28/2020    Chief complaint/Reason for consult: F/u palpitations    Interval History:  Pt is a 74 yo F. Non-obstructive coronary disease. Symptomatic ectopy. On low dose flecainide. Here for follow-up. Recently more SOB. Nuclear myocardial perfusion scan unremarkable. Echo showing elevated LV filling (Grade II DD). Here for follow-up.    ROS (+ highlighted in red):  General--Negative for fatigue, weight loss or weight gain  Cardiovascular--Negative for CP, orthopnea, PND    Past Medical History:   Diagnosis Date   • Anesthesia     \"couldn't open eye or talk\"   • Arrhythmia     PSVT   • CAD (coronary artery disease) 11/10/2010   • Dizziness 2/2/2010   • Heart burn    • High cholesterol    • Hypercholesteremia 11/10/2010   • Indigestion    • Murmur 11/10/2010   • Palpitations 11/10/2010   • Paroxysmal supraventricular tachycardia (HCC) 11/10/2010   • Unspecified cataract     IOL OD     Past Surgical History:   Procedure Laterality Date   • KNEE ARTHROSCOPY Left 7/6/2017    Procedure: KNEE ARTHROSCOPY;  Surgeon: Al Heredia M.D.;  Location: [facility];  Service:    • MEDIAL MENISCECTOMY Left 7/6/2017    Procedure: MEDIAL MENISCECTOMY - PARTIAL, MUIR;  Surgeon: Al Heredia M.D.;  Location: [facility];  Service:    • CATARACT PHACO WITH IOL Right 6/22/2015    Procedure: CATARACT PHACO WITH IOL;  Surgeon: David Llanos M.D.;  Location: [facility];  Service:    • ABDOMINOPLASTY  2015    and face lift   • INGUINAL HERNIA REPAIR Right 2014   • CARPAL TUNNEL RELEASE  2012   • SALPINGOSTOMY Bilateral 1975    repair of tubes   • PRIMARY C SECTION  1977, 1985       Social History     Socioeconomic History   • Marital status:      Spouse name: Not on file   • Number of children: Not on file   • Years of education: Not on file   • Highest education level: Not on file   Occupational History   • Occupation: School " teacher     Employer: OTHER   Social Needs   • Financial resource strain: Not on file   • Food insecurity     Worry: Not on file     Inability: Not on file   • Transportation needs     Medical: Not on file     Non-medical: Not on file   Tobacco Use   • Smoking status: Never Smoker   • Smokeless tobacco: Never Used   Substance and Sexual Activity   • Alcohol use: Yes     Comment: Occasionally   • Drug use: No   • Sexual activity: Not on file   Lifestyle   • Physical activity     Days per week: Not on file     Minutes per session: Not on file   • Stress: Not on file   Relationships   • Social connections     Talks on phone: Not on file     Gets together: Not on file     Attends Jain service: Not on file     Active member of club or organization: Not on file     Attends meetings of clubs or organizations: Not on file     Relationship status: Not on file   • Intimate partner violence     Fear of current or ex partner: Not on file     Emotionally abused: Not on file     Physically abused: Not on file     Forced sexual activity: Not on file   Other Topics Concern   • Not on file   Social History Narrative   • Not on file       Family History   Problem Relation Age of Onset   • Cancer Mother          at age 60; breast cancer   • Heart Attack Father          at age 83 of heart attack.   • Heart Attack Brother         heart attack at age 41 and bypass surgery   • Heart Attack Brother         bypass surgery at age 62   • Cancer Maternal Aunt        No Known Allergies    Current Outpatient Medications   Medication Sig Dispense Refill   • simvastatin (ZOCOR) 40 MG Tab TAKE ONE TABLET BY MOUTH AT BEDTIME 100 Tab 3   • metoprolol SR (TOPROL XL) 25 MG TABLET SR 24 HR Take 1 Tab by mouth every day. 100 Tab 3   • flecainide (TAMBOCOR) 50 MG tablet TAKE ONE TABLET BY MOUTH TWICE A  Tab 2   • pantoprazole (PROTONIX) 40 MG Tablet Delayed Response Take 40 mg by mouth every day.     • Calcium-Magnesium-Vitamin D  "(CALCIUM 500 PO) Take 2 Tabs by mouth every day.     • aspirin 81 MG tablet Take 81 mg by mouth every evening.       No current facility-administered medications for this visit.        Physical Exam:  Vitals:    12/28/20 1311   BP: 116/72   BP Location: Right arm   Patient Position: Sitting   BP Cuff Size: Adult   Pulse: (!) 55   SpO2: 97%   Weight: 57.2 kg (126 lb)   Height: 1.549 m (5' 1\")     General appearance: NAD, conversant  HEENT: PERRL, neck is supple with FROM  Lungs: Clear to auscultation, normal respiratory effort  CV: RRR, no murmurs/rubs/gallops, no JVD  Abdomen: Soft, non-tender with normal bowel sounds  Extremities: No peripheral edema, no clubbing or cyanosis  Skin: No rash, lesions, or ulcers  Psych: Alert and oriented to person, place and time    Data:  Labs reviewed    Prior echo/stress reviewed:  Grade I/II DD    EKG interpreted by me:  NSR    Impression/Plan:  1. PSVT (paroxysmal supraventricular tachycardia) (HCC)  EKG    Basic Metabolic Panel   2. Diastolic dysfunction     3. Encounter for monitoring flecainide therapy       -Doing well on the flecainide, continue BB/IC therapy  -I will add in small dose of aldactone for her  -Check chemistries in 2 weeks  -F/u in 3-4 mo    Eugene Vela MD    "

## 2020-12-29 DIAGNOSIS — I47.19 PAT (PAROXYSMAL ATRIAL TACHYCARDIA) (HCC): ICD-10-CM

## 2020-12-31 RX ORDER — FLECAINIDE ACETATE 50 MG/1
TABLET ORAL
Qty: 180 TAB | Refills: 1 | Status: SHIPPED | OUTPATIENT
Start: 2020-12-31 | End: 2021-06-19

## 2021-01-07 ENCOUNTER — HOSPITAL ENCOUNTER (OUTPATIENT)
Dept: LAB | Facility: MEDICAL CENTER | Age: 76
End: 2021-01-07
Attending: INTERNAL MEDICINE
Payer: MEDICARE

## 2021-01-07 DIAGNOSIS — I47.10 PSVT (PAROXYSMAL SUPRAVENTRICULAR TACHYCARDIA) (HCC): ICD-10-CM

## 2021-01-07 LAB
ANION GAP SERPL CALC-SCNC: 10 MMOL/L (ref 7–16)
BUN SERPL-MCNC: 16 MG/DL (ref 8–22)
CALCIUM SERPL-MCNC: 9.1 MG/DL (ref 8.5–10.5)
CHLORIDE SERPL-SCNC: 97 MMOL/L (ref 96–112)
CO2 SERPL-SCNC: 25 MMOL/L (ref 20–33)
CREAT SERPL-MCNC: 0.68 MG/DL (ref 0.5–1.4)
GLUCOSE SERPL-MCNC: 115 MG/DL (ref 65–99)
POTASSIUM SERPL-SCNC: 4.3 MMOL/L (ref 3.6–5.5)
SODIUM SERPL-SCNC: 132 MMOL/L (ref 135–145)

## 2021-01-07 PROCEDURE — 80048 BASIC METABOLIC PNL TOTAL CA: CPT

## 2021-01-07 PROCEDURE — 36415 COLL VENOUS BLD VENIPUNCTURE: CPT

## 2021-01-08 ENCOUNTER — HOSPITAL ENCOUNTER (OUTPATIENT)
Dept: LAB | Facility: MEDICAL CENTER | Age: 76
End: 2021-01-08
Attending: FAMILY MEDICINE
Payer: MEDICARE

## 2021-01-08 DIAGNOSIS — Z23 NEED FOR VACCINATION: ICD-10-CM

## 2021-01-08 LAB — D DIMER PPP IA.FEU-MCNC: 0.29 UG/ML (FEU) (ref 0–0.5)

## 2021-01-08 PROCEDURE — 85379 FIBRIN DEGRADATION QUANT: CPT

## 2021-01-08 PROCEDURE — 36415 COLL VENOUS BLD VENIPUNCTURE: CPT

## 2021-01-13 LAB — EKG IMPRESSION: NORMAL

## 2021-01-14 ENCOUNTER — HOSPITAL ENCOUNTER (OUTPATIENT)
Dept: RADIOLOGY | Facility: MEDICAL CENTER | Age: 76
End: 2021-01-14
Attending: FAMILY MEDICINE
Payer: MEDICARE

## 2021-01-14 DIAGNOSIS — R06.09 DYSPNEA ON EXERTION: ICD-10-CM

## 2021-01-14 PROCEDURE — 700117 HCHG RX CONTRAST REV CODE 255: Performed by: FAMILY MEDICINE

## 2021-01-14 PROCEDURE — 71275 CT ANGIOGRAPHY CHEST: CPT

## 2021-01-14 RX ADMIN — IOHEXOL 75 ML: 350 INJECTION, SOLUTION INTRAVENOUS at 14:05

## 2021-01-17 ENCOUNTER — IMMUNIZATION (OUTPATIENT)
Dept: FAMILY PLANNING/WOMEN'S HEALTH CLINIC | Facility: IMMUNIZATION CENTER | Age: 76
End: 2021-01-17
Attending: INTERNAL MEDICINE
Payer: MEDICARE

## 2021-01-17 DIAGNOSIS — Z23 ENCOUNTER FOR VACCINATION: Primary | ICD-10-CM

## 2021-01-17 DIAGNOSIS — Z23 NEED FOR VACCINATION: ICD-10-CM

## 2021-01-17 PROCEDURE — 0011A MODERNA SARS-COV-2 VACCINE: CPT | Performed by: NURSE PRACTITIONER

## 2021-01-17 PROCEDURE — 91301 MODERNA SARS-COV-2 VACCINE: CPT | Performed by: NURSE PRACTITIONER

## 2021-01-17 ASSESSMENT — ENCOUNTER SYMPTOMS
SHORTNESS OF BREATH: 1
RESPIRATORY SYMPTOMS COMMENTS: SOMETIMES
WHEEZING: 0
HEMOPTYSIS: 0
DYSPNEA AT REST: 0
CHEST TIGHTNESS: 1

## 2021-01-28 ENCOUNTER — APPOINTMENT (OUTPATIENT)
Dept: SLEEP MEDICINE | Facility: MEDICAL CENTER | Age: 76
End: 2021-01-28
Payer: MEDICARE

## 2021-02-04 ENCOUNTER — HOSPITAL ENCOUNTER (OUTPATIENT)
Dept: RADIOLOGY | Facility: MEDICAL CENTER | Age: 76
End: 2021-02-04
Attending: FAMILY MEDICINE
Payer: MEDICARE

## 2021-02-04 ENCOUNTER — HOSPITAL ENCOUNTER (OUTPATIENT)
Dept: RADIOLOGY | Facility: MEDICAL CENTER | Age: 76
End: 2021-02-04
Attending: PHYSICIAN ASSISTANT
Payer: MEDICARE

## 2021-02-04 DIAGNOSIS — Z12.31 VISIT FOR SCREENING MAMMOGRAM: ICD-10-CM

## 2021-02-04 DIAGNOSIS — N95.8 POSTARTIFICIAL MENOPAUSAL SYNDROME: ICD-10-CM

## 2021-02-04 PROCEDURE — 77080 DXA BONE DENSITY AXIAL: CPT

## 2021-02-04 PROCEDURE — 77063 BREAST TOMOSYNTHESIS BI: CPT

## 2021-02-14 ENCOUNTER — IMMUNIZATION (OUTPATIENT)
Dept: FAMILY PLANNING/WOMEN'S HEALTH CLINIC | Facility: IMMUNIZATION CENTER | Age: 76
End: 2021-02-14
Attending: INTERNAL MEDICINE
Payer: MEDICARE

## 2021-02-14 DIAGNOSIS — Z23 ENCOUNTER FOR VACCINATION: Primary | ICD-10-CM

## 2021-02-14 PROCEDURE — 0012A MODERNA SARS-COV-2 VACCINE: CPT

## 2021-02-14 PROCEDURE — 91301 MODERNA SARS-COV-2 VACCINE: CPT

## 2021-02-24 ENCOUNTER — OFFICE VISIT (OUTPATIENT)
Dept: SLEEP MEDICINE | Facility: MEDICAL CENTER | Age: 76
End: 2021-02-24
Payer: MEDICARE

## 2021-02-24 VITALS
DIASTOLIC BLOOD PRESSURE: 66 MMHG | WEIGHT: 125.38 LBS | HEART RATE: 73 BPM | OXYGEN SATURATION: 100 % | HEIGHT: 61 IN | SYSTOLIC BLOOD PRESSURE: 102 MMHG | BODY MASS INDEX: 23.67 KG/M2

## 2021-02-24 DIAGNOSIS — I51.89 DIASTOLIC DYSFUNCTION: ICD-10-CM

## 2021-02-24 DIAGNOSIS — J98.4 OTHER DISORDERS OF LUNG: ICD-10-CM

## 2021-02-24 DIAGNOSIS — R06.09 DOE (DYSPNEA ON EXERTION): ICD-10-CM

## 2021-02-24 DIAGNOSIS — I47.10 PAROXYSMAL SUPRAVENTRICULAR TACHYCARDIA (HCC): Chronic | ICD-10-CM

## 2021-02-24 PROCEDURE — 99204 OFFICE O/P NEW MOD 45 MIN: CPT | Performed by: INTERNAL MEDICINE

## 2021-02-24 ASSESSMENT — ENCOUNTER SYMPTOMS
COUGH: 0
PALPITATIONS: 0
FALLS: 0
WHEEZING: 0
ORTHOPNEA: 0
BLURRED VISION: 0
SPEECH CHANGE: 0
CHILLS: 0
FOCAL WEAKNESS: 0
EYE REDNESS: 0
SINUS PAIN: 0
BACK PAIN: 0
SHORTNESS OF BREATH: 1
HEARTBURN: 0
PND: 0
SPUTUM PRODUCTION: 0
PHOTOPHOBIA: 0
FEVER: 0
VOMITING: 0
DIAPHORESIS: 0
NAUSEA: 0
DEPRESSION: 0
HEMOPTYSIS: 0
NECK PAIN: 0
EYE DISCHARGE: 0
EYE PAIN: 0
TREMORS: 0
CONSTIPATION: 0
WEIGHT LOSS: 0
SORE THROAT: 0
DOUBLE VISION: 0
ABDOMINAL PAIN: 0
DIARRHEA: 0
DIZZINESS: 0
CLAUDICATION: 0
HEADACHES: 0
MYALGIAS: 0
STRIDOR: 0
WEAKNESS: 0

## 2021-02-24 ASSESSMENT — FIBROSIS 4 INDEX: FIB4 SCORE: 0.95

## 2021-02-24 NOTE — PROGRESS NOTES
Chief Complaint   Patient presents with   • Establish Care     Referred by Dr Lacey for SOB,   • Other     CT-CTA 11/04/20, CXR 12/11/20, ECHO 12/1/20, Stress test 11/30/20       HPI: This patient is a 75 y.o. female presenting for evaluation of dyspnea on exertion.  The patient's past medical history is significant for nonobstructive coronary artery disease, diastolic dysfunction based on most recent echocardiogram done on December 1, 2020, paroxysmal supraventricular tachycardia treated with flecainide and metoprolol.  She also has some chronic diarrhea for which she takes loperamide on a semiregular basis.  She is on a statin for dyslipidemia.  She is a lifelong non-smoker.  She is retired from a career in teaching.  She has 2 pet dogs at home, no birds.  No occupational or environmental exposures.  Family history is notable for 2 brothers with coronary artery disease and a mother and 2 sisters with breast cancer.  The patient presents today for evaluation of dyspnea on exertion.  She first noticed the symptoms in July of last year while [place] bags to the airport.  She was unable to walk and actually had to get assistance due to dyspnea.  She also at that time noted some dyspnea going up a flight of stairs which has slowly progressed to the point that she is short of breath sometimes now even going downstairs.  No associated chest pain, wheezing or cough.  No edema.  She was started on diuretic recently due to diastolic dysfunction.  She did have a nuclear medicine stress test on November 30 that showed no evidence of ischemia.  Of note max heart rate reached during that test was 88 when in the past prior to being started on flecainide and metoprolol in 2017 she was able to meet predicted maximum heart rate.  CT angiogram done to evaluate her symptoms in November showed no evidence of pulmonary emboli, no parenchymal lung disease, some calcified mediastinal and hilar lymphadenopathy.    Past Medical  "History:   Diagnosis Date   • Anesthesia     \"couldn't open eye or talk\"   • Arrhythmia     PSVT   • CAD (coronary artery disease) 11/10/2010   • Cardiac arrhythmia    • Chickenpox    • Crohns disease    • Diarrhea    • Dizziness 2/2/2010   • GERD (gastroesophageal reflux disease)    • Heart burn    • Heartburn    • High cholesterol    • Hypercholesteremia 11/10/2010   • Indigestion    • Influenza    • Mumps    • Murmur 11/10/2010   • Osteoporosis    • Palpitations 11/10/2010   • Paroxysmal supraventricular tachycardia (HCC) 11/10/2010   • Pneumonia    • Shortness of breath    • Tonsillitis    • Unspecified cataract     IOL OD       Social History     Socioeconomic History   • Marital status:      Spouse name: Not on file   • Number of children: Not on file   • Years of education: Not on file   • Highest education level: Not on file   Occupational History   • Occupation:      Employer: OTHER   Tobacco Use   • Smoking status: Never Smoker   • Smokeless tobacco: Never Used   Substance and Sexual Activity   • Alcohol use: Yes     Comment: Rarely   • Drug use: Never   • Sexual activity: Not on file   Other Topics Concern   • Not on file   Social History Narrative   • Not on file     Social Determinants of Health     Financial Resource Strain:    • Difficulty of Paying Living Expenses:    Food Insecurity:    • Worried About Running Out of Food in the Last Year:    • Ran Out of Food in the Last Year:    Transportation Needs:    • Lack of Transportation (Medical):    • Lack of Transportation (Non-Medical):    Physical Activity:    • Days of Exercise per Week:    • Minutes of Exercise per Session:    Stress:    • Feeling of Stress :    Social Connections:    • Frequency of Communication with Friends and Family:    • Frequency of Social Gatherings with Friends and Family:    • Attends Muslim Services:    • Active Member of Clubs or Organizations:    • Attends Club or Organization Meetings:    • " Marital Status:    Intimate Partner Violence:    • Fear of Current or Ex-Partner:    • Emotionally Abused:    • Physically Abused:    • Sexually Abused:        Family History   Problem Relation Age of Onset   • Cancer Mother         Breast   • Heart Attack Father          at age 83 of heart attack.   • Heart Attack Brother         heart attack at age 41 and bypass surgery   • Heart Attack Brother         bypass surgery at age 62   • Cancer Maternal Aunt         Breast       Current Outpatient Medications on File Prior to Visit   Medication Sig Dispense Refill   • flecainide (TAMBOCOR) 50 MG tablet TAKE ONE TABLET BY MOUTH TWICE A  Tab 1   • spironolactone (ALDACTONE) 25 MG Tab Take 1 Tab by mouth every day. 30 Tab 3   • simvastatin (ZOCOR) 40 MG Tab TAKE ONE TABLET BY MOUTH AT BEDTIME 100 Tab 3   • metoprolol SR (TOPROL XL) 25 MG TABLET SR 24 HR Take 1 Tab by mouth every day. 100 Tab 3   • pantoprazole (PROTONIX) 40 MG Tablet Delayed Response Take 40 mg by mouth every day.     • Calcium-Magnesium-Vitamin D (CALCIUM 500 PO) Take 2 Tabs by mouth every day.     • aspirin 81 MG tablet Take 81 mg by mouth every evening.       No current facility-administered medications on file prior to visit.       Allergies: Patient has no known allergies.    ROS:   Review of Systems   Constitutional: Negative for chills, diaphoresis, fever, malaise/fatigue and weight loss.   HENT: Negative for congestion, ear discharge, ear pain, hearing loss, nosebleeds, sinus pain, sore throat and tinnitus.    Eyes: Negative for blurred vision, double vision, photophobia, pain, discharge and redness.   Respiratory: Positive for shortness of breath. Negative for cough, hemoptysis, sputum production, wheezing and stridor.    Cardiovascular: Negative for chest pain, palpitations, orthopnea, claudication, leg swelling and PND.   Gastrointestinal: Negative for abdominal pain, constipation, diarrhea, heartburn, nausea and vomiting.  "  Genitourinary: Negative for dysuria and urgency.   Musculoskeletal: Negative for back pain, falls, joint pain, myalgias and neck pain.   Skin: Negative for itching and rash.   Neurological: Negative for dizziness, tremors, speech change, focal weakness, weakness and headaches.   Endo/Heme/Allergies: Negative for environmental allergies.   Psychiatric/Behavioral: Negative for depression.       /66 (BP Location: Right arm, Patient Position: Sitting, BP Cuff Size: Adult)   Pulse 73   Ht 1.549 m (5' 1\")   Wt 56.9 kg (125 lb 6 oz)   SpO2 100%     Physical Exam:  Physical Exam  Constitutional:       General: She is not in acute distress.     Appearance: Normal appearance. She is well-developed and normal weight.   HENT:      Head: Normocephalic and atraumatic.      Right Ear: External ear normal.      Left Ear: External ear normal.      Mouth/Throat:      Pharynx: No oropharyngeal exudate.   Eyes:      General: No scleral icterus.     Conjunctiva/sclera: Conjunctivae normal.      Pupils: Pupils are equal, round, and reactive to light.   Neck:      Vascular: No JVD.      Trachea: No tracheal deviation.   Cardiovascular:      Rate and Rhythm: Normal rate and regular rhythm.      Heart sounds: Normal heart sounds. No murmur. No friction rub. No gallop.    Pulmonary:      Effort: No accessory muscle usage or respiratory distress.      Breath sounds: Normal breath sounds. No wheezing or rales.   Abdominal:      General: There is no distension.      Palpations: Abdomen is soft.      Tenderness: There is no abdominal tenderness.   Musculoskeletal:         General: No tenderness or deformity. Normal range of motion.      Cervical back: Neck supple.   Lymphadenopathy:      Cervical: No cervical adenopathy.   Skin:     General: Skin is warm and dry.      Findings: No rash.      Nails: There is no clubbing.   Neurological:      Mental Status: She is alert and oriented to person, place, and time.      Cranial Nerves: No " cranial nerve deficit.      Gait: Gait normal.         PFTs as reviewed by me personally: None    Imaging as reviewed by me personally:    Assessment:  1. MATAMOROS (dyspnea on exertion)  PULMONARY FUNCTION TESTS -Test requested: Complete Pulmonary Function Test; Include MIPS/MEPS? Yes    Exercise Test for Bronchospasm / 6-Minute Walk   2. Other disorders of lung   PULMONARY FUNCTION TESTS -Test requested: Complete Pulmonary Function Test; Include MIPS/MEPS? Yes    Exercise Test for Bronchospasm / 6-Minute Walk   3. Paroxysmal supraventricular tachycardia (HCC)     4. Diastolic dysfunction         Plan:  1.  This is chronic and progressive.  She has had recent evaluation by cardiology with evidence of diastolic dysfunction but no ischemic heart disease.  Of note she did have a blunted heart rate response related to her antiarrhythmic therapy.  She has normal room air saturation 100% today with normal pulmonary exam.  No evidence for parenchymal lung disease on CT or pulmonary emboli.  We will start with full pulmonary function testing including maximum inspiratory and maximum expiratory pressures to evaluate her diaphragm strength.  If this is normal I would recommend cardiopulmonary exercise test to evaluate whether or not the blunting of her heart response may be contributing to her dyspnea.  I have also ordered a 6-minute walk test but I have low suspicion for any desaturation given room air SaO2 100% today.  2.  Duplicate diagnosis to obtain PFTs.  3.  Followed by cardiology.  On flecainide and metoprolol.  See above.  4.  No evidence for decompensation on exam today.  Hard to know how much this is potentially contributing.  It is possible that LVEDP rises with activity.  Treatment for this however would be beta-blocker and diuresis which she has already been started on.  Return in about 6 weeks (around 4/7/2021) for PFTs.

## 2021-03-10 ENCOUNTER — NON-PROVIDER VISIT (OUTPATIENT)
Dept: SLEEP MEDICINE | Facility: MEDICAL CENTER | Age: 76
End: 2021-03-10
Attending: INTERNAL MEDICINE
Payer: MEDICARE

## 2021-03-10 VITALS — HEIGHT: 59 IN | BODY MASS INDEX: 25.2 KG/M2 | WEIGHT: 125 LBS

## 2021-03-10 DIAGNOSIS — R06.09 DOE (DYSPNEA ON EXERTION): ICD-10-CM

## 2021-03-10 DIAGNOSIS — J98.4 OTHER DISORDERS OF LUNG: ICD-10-CM

## 2021-03-10 PROCEDURE — 94726 PLETHYSMOGRAPHY LUNG VOLUMES: CPT | Performed by: INTERNAL MEDICINE

## 2021-03-10 PROCEDURE — 94060 EVALUATION OF WHEEZING: CPT | Performed by: INTERNAL MEDICINE

## 2021-03-10 PROCEDURE — 94729 DIFFUSING CAPACITY: CPT | Performed by: INTERNAL MEDICINE

## 2021-03-10 PROCEDURE — 94618 PULMONARY STRESS TESTING: CPT | Performed by: INTERNAL MEDICINE

## 2021-03-10 ASSESSMENT — 6 MINUTE WALK TEST (6MWT)
PERCEIVED BREATHLESSNESS AT 5 MIN: 2
PERCENT OF NORMAL WALKED: 76
SAO2 AT 1 MIN: 93
PERCEIVED BREATHLESSNESS AT 2 MIN: 0.5
O2 SAT PERCENT ROOM AIR: 95
PERCEIVED FATIGUE AT 4 MIN: 0
SAO2 AT 2 MIN: 98
PERCEIVED BREATHLESSNESS AT 1 MIN: 0.5
SITTING BLOOD PRESSURE: 104/62
PERCEIVED FATIGUE AT 6 MIN: 0
AMBULATES WITH O2: WITHOUT O2
PERCEIVED BREATHLESSNESS AT 3 MIN: 1
SAO2 AT 3 MIN: 92
SAO2 AT 4 MIN: 90
HEART RATE AT 6 MIN: 80
SAO2 AT 1 MIN: 99
HEART RATE AT 2 MIN: 76
BLOOD PRESSURE AT 1 MIN: 106/68
BLOOD PRESSURE: LEFT ARM
HEART RATE AT 1 MIN: 60
SAO2 AT 6 MIN: 93
PERCEIVED BREATHLESSNESS AT 6 MIN: 2
HEART RATE: 56
SAO2 AT 2 MIN: 92
TOTAL REST TIME: 0
PERCEIVED FATIGUE AT 5 MIN: 0
HEART RATE AT 4 MIN: 81
NUMBER OF RESTS: 0
PERCEIVED BREATHLESSNESS AT 1 MIN: 0.5
PERCEIVED FATIGUE AT 2 MIN: 0
PERCEIVED BREATHLESSNESS AT 4 MIN: 1
PERCEIVED FATIGUE AT 3 MIN: 0
SAO2 AT 5 MIN: 91
HEART RATE AT 3 MIN: 77
HEART RATE AT 1 MIN: 70
COMMENTS: TEST ON ROOM AIR.
PERCEIVED BREATHLESSNESS AT 2 MIN: 0.5
PERCEIVED FATIGUE AT 2 MIN: 0
PERCEIVED FATIGUE AT 1 MIN: 0
PERCEIVED FATIGUE AT 1 MIN: 0
HEART RATE AT 5 MIN: 83
HEART RATE AT 2 MIN: 62

## 2021-03-10 ASSESSMENT — PULMONARY FUNCTION TESTS
FVC: 2.2
FEV1/FVC: 83
FEV1/FVC_PERCENT_CHANGE: 2
FEV1/FVC_PERCENT_PREDICTED: 108
FEV1_PERCENT_CHANGE: 7
FEV1_PERCENT_CHANGE: 5
FVC_PERCENT_PREDICTED: 93
FEV1/FVC_PERCENT_PREDICTED: 106
FEV1_PERCENT_PREDICTED: 99
FEV1_PERCENT_PREDICTED: 107
FVC_PREDICTED: 2.24
FVC_LLN: 1.87
FEV1: 1.73
FEV1: 1.87
FEV1/FVC_PERCENT_CHANGE: 140
FVC_PERCENT_PREDICTED: 98
FEV1/FVC_PERCENT_PREDICTED: 109
FEV1/FVC_PERCENT_PREDICTED: 78
FEV1/FVC_PERCENT_LLN: 65
FEV1/FVC: 83
FEV1/FVC: 85
FVC: 2.08
FEV1/FVC: 85
FEV1_LLN: 1.45
FEV1/FVC_PERCENT_PREDICTED: 106
FEV1/FVC_PREDICTED: 78
FEV1_PREDICTED: 1.74

## 2021-03-10 ASSESSMENT — FIBROSIS 4 INDEX: FIB4 SCORE: 0.95

## 2021-03-10 NOTE — PROCEDURES
Test on Room Air.    There was abnormal desaturation with ambulation as evidenced by decrease from 95% on RA at rest to a mindy of 90% at 4 minutes however pt recovered to 93% by 6 minutes without need for supplemental O2. Distance walked was 1080 ft or 76% predicted. Exercise tolerance mildly reduced and no clear indication for supplemental O2.

## 2021-03-10 NOTE — PROCEDURES
Technician: AVANI Mandel    Technician Comment:  Good patient effort & cooperation.  The results of this test meet the ATS/ERS standards for acceptability & reproducibility.  Test was performed on the [employer] Body Plethysmograph-Elite DX system.  Predicted values were GLI-2012 for spirometry, GLI-2017 for DLCO, ITS for Lung Volumes.  The DLCO was uncorrected for Hgb.  A bronchodilator of Xopenex HFA -2puffs via spacer administered.  DLCO performed during dilation period.    Interpretation:  1.  Baseline spirometry shows normal airflows.  2.  There is trend toward bronchodilator response but does not meet ATS criteria.  3.  Lung volumes are within normal limits.  4.  Diffusion capacity is within normal limits.  Normal pulmonary function testing with normal flow volume loop.  This does not rule out reactive airways disease.  Suggest clinical correlation.

## 2021-03-26 ENCOUNTER — OFFICE VISIT (OUTPATIENT)
Dept: CARDIOLOGY | Facility: MEDICAL CENTER | Age: 76
End: 2021-03-26
Payer: MEDICARE

## 2021-03-26 VITALS
HEIGHT: 61 IN | WEIGHT: 129 LBS | BODY MASS INDEX: 24.35 KG/M2 | DIASTOLIC BLOOD PRESSURE: 82 MMHG | OXYGEN SATURATION: 99 % | SYSTOLIC BLOOD PRESSURE: 118 MMHG | HEART RATE: 59 BPM

## 2021-03-26 DIAGNOSIS — Z79.899 ENCOUNTER FOR MONITORING FLECAINIDE THERAPY: ICD-10-CM

## 2021-03-26 DIAGNOSIS — I47.10 PSVT (PAROXYSMAL SUPRAVENTRICULAR TACHYCARDIA) (HCC): ICD-10-CM

## 2021-03-26 DIAGNOSIS — Z51.81 ENCOUNTER FOR MONITORING FLECAINIDE THERAPY: ICD-10-CM

## 2021-03-26 PROCEDURE — 93000 ELECTROCARDIOGRAM COMPLETE: CPT | Performed by: INTERNAL MEDICINE

## 2021-03-26 PROCEDURE — 99214 OFFICE O/P EST MOD 30 MIN: CPT | Performed by: INTERNAL MEDICINE

## 2021-03-26 ASSESSMENT — FIBROSIS 4 INDEX: FIB4 SCORE: 0.95

## 2021-03-26 NOTE — PROGRESS NOTES
"Arrhythmia Clinic Note (Established patient)    DOS: 3/26/2021    Chief complaint/Reason for consult: F/u SVT    Interval History:  Patient is a 74 yo F. History of symptomatic ectopy. We started low dose BB/IC combination for her and has really helped her symptoms out. She has developed marked exertional dyspnea. Says hard to even go up a flight of stairs. Mild desats with 6 minute walk but pulmonary work up has not found anything that remarkable. Here for follow-up today.    ROS (+ highlighted in red):  General--Negative for fatigue, weight loss or weight gain  Cardiovascular--Negative for CP, orthopnea, PND    Past Medical History:   Diagnosis Date   • Anesthesia     \"couldn't open eye or talk\"   • Arrhythmia     PSVT   • CAD (coronary artery disease) 11/10/2010   • Cardiac arrhythmia    • Chickenpox    • Crohns disease    • Diarrhea    • Dizziness 2/2/2010   • GERD (gastroesophageal reflux disease)    • Heart burn    • Heartburn    • High cholesterol    • Hypercholesteremia 11/10/2010   • Indigestion    • Influenza    • Mumps    • Murmur 11/10/2010   • Osteoporosis    • Palpitations 11/10/2010   • Paroxysmal supraventricular tachycardia (HCC) 11/10/2010   • Pneumonia    • Shortness of breath    • Tonsillitis    • Unspecified cataract     IOL OD       Past Surgical History:   Procedure Laterality Date   • KNEE ARTHROSCOPY Left 7/6/2017    Procedure: KNEE ARTHROSCOPY;  Surgeon: Al Heredia M.D.;  Location: [facility];  Service:    • MEDIAL MENISCECTOMY Left 7/6/2017    Procedure: MEDIAL MENISCECTOMY - PARTIAL, MUIR;  Surgeon: Al Heredia M.D.;  Location: [facility];  Service:    • CATARACT PHACO WITH IOL Right 6/22/2015    Procedure: CATARACT PHACO WITH IOL;  Surgeon: David Llanos M.D.;  Location: [facility];  Service:    • ABDOMINOPLASTY  2015    and face lift   • INGUINAL HERNIA REPAIR Right 2014   • CARPAL TUNNEL RELEASE  2012   • SALPINGOSTOMY Bilateral "     repair of tubes   • ARTHROSCOPY, KNEE     • PRIMARY C SECTION  1985       Social History     Socioeconomic History   • Marital status:      Spouse name: Not on file   • Number of children: Not on file   • Years of education: Not on file   • Highest education level: Not on file   Occupational History   • Occupation:      Employer: OTHER   Tobacco Use   • Smoking status: Never Smoker   • Smokeless tobacco: Never Used   Substance and Sexual Activity   • Alcohol use: Yes     Comment: Rarely   • Drug use: Never   • Sexual activity: Not on file   Other Topics Concern   • Not on file   Social History Narrative   • Not on file     Social Determinants of Health     Financial Resource Strain:    • Difficulty of Paying Living Expenses:    Food Insecurity:    • Worried About Running Out of Food in the Last Year:    • Ran Out of Food in the Last Year:    Transportation Needs:    • Lack of Transportation (Medical):    • Lack of Transportation (Non-Medical):    Physical Activity:    • Days of Exercise per Week:    • Minutes of Exercise per Session:    Stress:    • Feeling of Stress :    Social Connections:    • Frequency of Communication with Friends and Family:    • Frequency of Social Gatherings with Friends and Family:    • Attends [employer] Services:    • Active Member of Clubs or Organizations:    • Attends Club or Organization Meetings:    • Marital Status:    Intimate Partner Violence:    • Fear of Current or Ex-Partner:    • Emotionally Abused:    • Physically Abused:    • Sexually Abused:        Family History   Problem Relation Age of Onset   • Cancer Mother         Breast   • Heart Attack Father          at age 83 of heart attack.   • Heart Attack Brother         heart attack at age 41 and bypass surgery   • Heart Attack Brother         bypass surgery at age 62   • Cancer Maternal Aunt         Breast       No Known Allergies    Current Outpatient Medications   Medication Sig  "Dispense Refill   • flecainide (TAMBOCOR) 50 MG tablet TAKE ONE TABLET BY MOUTH TWICE A  Tab 1   • simvastatin (ZOCOR) 40 MG Tab TAKE ONE TABLET BY MOUTH AT BEDTIME 100 Tab 3   • metoprolol SR (TOPROL XL) 25 MG TABLET SR 24 HR Take 1 Tab by mouth every day. 100 Tab 3   • pantoprazole (PROTONIX) 40 MG Tablet Delayed Response Take 40 mg by mouth every day.     • aspirin 81 MG tablet Take 81 mg by mouth every evening.     • spironolactone (ALDACTONE) 25 MG Tab Take 1 Tab by mouth every day. 30 Tab 3   • Calcium-Magnesium-Vitamin D (CALCIUM 500 PO) Take 2 Tabs by mouth every day.       No current facility-administered medications for this visit.       Physical Exam:  Vitals:    03/26/21 1255   BP: 118/82   BP Location: Left arm   Patient Position: Sitting   BP Cuff Size: Adult   Pulse: (!) 59   SpO2: 99%   Weight: 58.5 kg (129 lb)   Height: 1.549 m (5' 1\")     General appearance: NAD, conversant  HEENT: PERRL, neck is supple with FROM  Lungs: Clear to auscultation, normal respiratory effort  CV: RRR, no murmurs/rubs/gallops, no JVD  Abdomen: Soft, non-tender with normal bowel sounds  Extremities: No peripheral edema, no clubbing or cyanosis  Skin: No rash, lesions, or ulcers  Psych: Alert and oriented to person, place and time    Data:  Labs reviewed    Prior echo/stress reviewed:  Preserved LV function, grade II DD    EKG interpreted by me:  Sinus    Impression/Plan:  1. PSVT (paroxysmal supraventricular tachycardia) (formerly [facility])  EKG   2. Encounter for monitoring flecainide therapy  [facility] Treadmill Stress     -EKG and QRS looks okay for for Flec  -ETT today to assess for chronotropic competence on the antiarrhythmic therapy  -F/u after ETT  Eugene Vela MD    "

## 2021-03-27 LAB — EKG IMPRESSION: NORMAL

## 2021-04-06 ENCOUNTER — OFFICE VISIT (OUTPATIENT)
Dept: SLEEP MEDICINE | Facility: MEDICAL CENTER | Age: 76
End: 2021-04-06
Payer: MEDICARE

## 2021-04-06 VITALS
OXYGEN SATURATION: 95 % | HEART RATE: 63 BPM | BODY MASS INDEX: 24.17 KG/M2 | WEIGHT: 128 LBS | RESPIRATION RATE: 16 BRPM | DIASTOLIC BLOOD PRESSURE: 56 MMHG | HEIGHT: 61 IN | TEMPERATURE: 97.3 F | SYSTOLIC BLOOD PRESSURE: 104 MMHG

## 2021-04-06 DIAGNOSIS — I47.10 PAROXYSMAL SUPRAVENTRICULAR TACHYCARDIA (HCC): ICD-10-CM

## 2021-04-06 DIAGNOSIS — R06.09 DOE (DYSPNEA ON EXERTION): ICD-10-CM

## 2021-04-06 DIAGNOSIS — I51.89 DIASTOLIC DYSFUNCTION: ICD-10-CM

## 2021-04-06 PROCEDURE — 99213 OFFICE O/P EST LOW 20 MIN: CPT | Performed by: INTERNAL MEDICINE

## 2021-04-06 ASSESSMENT — ENCOUNTER SYMPTOMS
SPEECH CHANGE: 0
WEIGHT LOSS: 0
PHOTOPHOBIA: 0
VOMITING: 0
WHEEZING: 0
TREMORS: 0
DIARRHEA: 0
ORTHOPNEA: 0
SPUTUM PRODUCTION: 0
DEPRESSION: 0
NECK PAIN: 0
DIAPHORESIS: 0
SINUS PAIN: 0
EYE PAIN: 0
FEVER: 0
HEMOPTYSIS: 0
BACK PAIN: 0
CONSTIPATION: 0
CLAUDICATION: 0
CHILLS: 0
WEAKNESS: 0
DIZZINESS: 0
ABDOMINAL PAIN: 0
DOUBLE VISION: 0
HEADACHES: 0
EYE DISCHARGE: 0
PALPITATIONS: 0
COUGH: 0
NAUSEA: 0
MYALGIAS: 0
SHORTNESS OF BREATH: 1
STRIDOR: 0
EYE REDNESS: 0
FOCAL WEAKNESS: 0
SORE THROAT: 0
FALLS: 0
BLURRED VISION: 0
HEARTBURN: 0
PND: 0

## 2021-04-06 ASSESSMENT — FIBROSIS 4 INDEX: FIB4 SCORE: 0.95

## 2021-04-06 NOTE — PROGRESS NOTES
Chief Complaint   Patient presents with   • Follow-Up     last seen 2/24/21    • Results     PFT, 6MW 3/10/21          HPI: This patient is a 75 y.o. female whom is followed in our clinic for dyspnea on exertion last seen by me on 2/24/2021.  The patient's past medical history is significant for nonobstructive coronary artery disease, diastolic dysfunction based on most recent echocardiogram done on December 1, 2020, paroxysmal supraventricular tachycardia treated with flecainide and metoprolol.  She also has some chronic diarrhea for which she takes loperamide on a semiregular basis.  She is on a statin for dyslipidemia.  She is a lifelong non-smoker.  Patient was referred to me for dyspnea on exertion which she first noted in July of last year.  This included when going up flights of stairs or when carrying heavy luggage.  No associated symptoms of chest pain, cough or wheezing.  No edema.  She had been started on diuretic therapy for diastolic dysfunction and had a nuclear medicine stress test in November last year that showed no evidence of ischemic heart disease however was notable for a maximum heart rate of 88 when prior to being started on flecainide and metoprolol for SVT in 2017 she was able to meet predicted maximum heart rate.  She did have a CT angiogram done to evaluate shortness of breath in November that showed no pulmonary emboli or parenchymal lung disease.  We plan to obtain pulmonary function testing and 6-minute walk test.  She did not desaturate on 6-minute walk although did drop briefly to 90 with rebound to 93 with continued ambulation which is a normal response.  Exercise tolerance was fair.  Pulmonary function testing showed normal airflows with normal lung volumes and normal DLCO.  She was seen by electrophysiology on March 26 and has plans undergo exercise treadmill stress test to assess chronotropic competence.  She has no new symptoms today.  Symptoms are neither worse nor better than  "when I saw her last.    Past Medical History:   Diagnosis Date   • Anesthesia     \"couldn't open eye or talk\"   • Arrhythmia     PSVT   • CAD (coronary artery disease) 11/10/2010   • Cardiac arrhythmia    • Chickenpox    • Crohns disease    • Diarrhea    • Dizziness 2/2/2010   • GERD (gastroesophageal reflux disease)    • Heart burn    • Heartburn    • High cholesterol    • Hypercholesteremia 11/10/2010   • Indigestion    • Influenza    • Mumps    • Murmur 11/10/2010   • Osteoporosis    • Palpitations 11/10/2010   • Paroxysmal supraventricular tachycardia (HCC) 11/10/2010   • Pneumonia    • Shortness of breath    • Tonsillitis    • Unspecified cataract     IOL OD       Social History     Socioeconomic History   • Marital status:      Spouse name: Not on file   • Number of children: Not on file   • Years of education: Not on file   • Highest education level: Not on file   Occupational History   • Occupation:      Employer: OTHER   Tobacco Use   • Smoking status: Never Smoker   • Smokeless tobacco: Never Used   Substance and Sexual Activity   • Alcohol use: Yes     Comment: Rarely   • Drug use: Never   • Sexual activity: Not on file   Other Topics Concern   • Not on file   Social History Narrative   • Not on file     Social Determinants of Health     Financial Resource Strain:    • Difficulty of Paying Living Expenses:    Food Insecurity:    • Worried About Running Out of Food in the Last Year:    • Ran Out of Food in the Last Year:    Transportation Needs:    • Lack of Transportation (Medical):    • Lack of Transportation (Non-Medical):    Physical Activity:    • Days of Exercise per Week:    • Minutes of Exercise per Session:    Stress:    • Feeling of Stress :    Social Connections:    • Frequency of Communication with Friends and Family:    • Frequency of Social Gatherings with Friends and Family:    • Attends [employer] Services:    • Active Member of Clubs or Organizations:    • Attends Club " or Organization Meetings:    • Marital Status:    Intimate Partner Violence:    • Fear of Current or Ex-Partner:    • Emotionally Abused:    • Physically Abused:    • Sexually Abused:        Family History   Problem Relation Age of Onset   • Cancer Mother         Breast   • Heart Attack Father          at age 83 of heart attack.   • Heart Attack Brother         heart attack at age 41 and bypass surgery   • Heart Attack Brother         bypass surgery at age 62   • Cancer Maternal Aunt         Breast       Current Outpatient Medications on File Prior to Visit   Medication Sig Dispense Refill   • flecainide (TAMBOCOR) 50 MG tablet TAKE ONE TABLET BY MOUTH TWICE A  Tab 1   • spironolactone (ALDACTONE) 25 MG Tab Take 1 Tab by mouth every day. 30 Tab 3   • simvastatin (ZOCOR) 40 MG Tab TAKE ONE TABLET BY MOUTH AT BEDTIME 100 Tab 3   • metoprolol SR (TOPROL XL) 25 MG TABLET SR 24 HR Take 1 Tab by mouth every day. 100 Tab 3   • pantoprazole (PROTONIX) 40 MG Tablet Delayed Response Take 40 mg by mouth every day.     • aspirin 81 MG tablet Take 81 mg by mouth every evening.       No current facility-administered medications on file prior to visit.       Patient has no known allergies.      ROS:   Review of Systems   Constitutional: Negative for chills, diaphoresis, fever, malaise/fatigue and weight loss.   HENT: Negative for congestion, ear discharge, ear pain, hearing loss, nosebleeds, sinus pain, sore throat and tinnitus.    Eyes: Negative for blurred vision, double vision, photophobia, pain, discharge and redness.   Respiratory: Positive for shortness of breath. Negative for cough, hemoptysis, sputum production, wheezing and stridor.    Cardiovascular: Negative for chest pain, palpitations, orthopnea, claudication, leg swelling and PND.   Gastrointestinal: Negative for abdominal pain, constipation, diarrhea, heartburn, nausea and vomiting.   Genitourinary: Negative for dysuria and urgency.   Musculoskeletal:  "Negative for back pain, falls, joint pain, myalgias and neck pain.   Skin: Negative for itching and rash.   Neurological: Negative for dizziness, tremors, speech change, focal weakness, weakness and headaches.   Endo/Heme/Allergies: Negative for environmental allergies.   Psychiatric/Behavioral: Negative for depression.       /56 (BP Location: Right arm, Patient Position: Sitting, BP Cuff Size: Adult)   Pulse 63   Temp 36.3 °C (97.3 °F) (Temporal)   Resp 16   Ht 1.549 m (5' 1\")   Wt 58.1 kg (128 lb)   SpO2 95%   Physical Exam  Constitutional:       General: She is not in acute distress.     Appearance: Normal appearance. She is well-developed.   HENT:      Head: Normocephalic and atraumatic.      Right Ear: External ear normal.      Left Ear: External ear normal.      Nose: Nose normal. No congestion.      Mouth/Throat:      Mouth: Mucous membranes are moist.      Pharynx: Oropharynx is clear. No oropharyngeal exudate.   Eyes:      General: No scleral icterus.     Extraocular Movements: Extraocular movements intact.      Conjunctiva/sclera: Conjunctivae normal.      Pupils: Pupils are equal, round, and reactive to light.   Neck:      Vascular: No JVD.      Trachea: No tracheal deviation.   Cardiovascular:      Rate and Rhythm: Normal rate and regular rhythm.      Heart sounds: Normal heart sounds. No murmur. No friction rub. No gallop.    Pulmonary:      Effort: Pulmonary effort is normal. No accessory muscle usage or respiratory distress.      Breath sounds: Normal breath sounds. No wheezing or rales.   Abdominal:      General: There is no distension.      Palpations: Abdomen is soft.      Tenderness: There is no abdominal tenderness.   Musculoskeletal:         General: No tenderness or deformity. Normal range of motion.      Cervical back: Normal range of motion and neck supple.      Right lower leg: No edema.      Left lower leg: No edema.   Lymphadenopathy:      Cervical: No cervical adenopathy. "   Skin:     General: Skin is warm and dry.      Findings: No rash.      Nails: There is no clubbing.   Neurological:      Mental Status: She is alert and oriented to person, place, and time.      Cranial Nerves: No cranial nerve deficit.      Gait: Gait normal.   Psychiatric:         Mood and Affect: Mood normal.         Behavior: Behavior normal.         PFTs as reviewed by me personally: As per HPI    Imaging as reviewed by me personally: As per HPI    Assessment:  1. MATAMOROS (dyspnea on exertion)     2. Paroxysmal supraventricular tachycardia (HCC)     3. Diastolic dysfunction         Plan:  1.  At this point imaging, pulmonary function testing and 6-minute walk test have revealed no significant pulmonary abnormalities.  I agree with functional testing with exercise treadmill stress test to evaluate for chronotropic competence.  Patient will follow up with us pending results of that study if no clear cardiac cause for shortness of breath can be found.  2.  Patient symptoms appear to have developed after being started on flecainide and metoprolol.  She was recently seen by electrophysiology who plans to assess chronotropic competence.  I agree with this plan and we're happy to see her afterwards if no cardiac causes can be found or treated for her symptoms.  3.  Hard to say if this is contributing to her symptoms.  No evidence for decompensation from a heart failure standpoint.  Follow-up with cardiology.  Return if symptoms worsen or fail to improve.

## 2021-04-29 DIAGNOSIS — I51.89 DIASTOLIC DYSFUNCTION: ICD-10-CM

## 2021-04-30 RX ORDER — SPIRONOLACTONE 25 MG/1
TABLET ORAL
Qty: 100 TABLET | Refills: 2 | Status: SHIPPED | OUTPATIENT
Start: 2021-04-30 | End: 2021-04-30

## 2021-04-30 RX ORDER — SPIRONOLACTONE 25 MG/1
TABLET ORAL
Qty: 100 TABLET | Refills: 2 | Status: SHIPPED | OUTPATIENT
Start: 2021-04-30 | End: 2022-03-07

## 2021-06-02 ENCOUNTER — OFFICE VISIT (OUTPATIENT)
Dept: CARDIOLOGY | Facility: MEDICAL CENTER | Age: 76
End: 2021-06-02

## 2021-06-02 ENCOUNTER — NON-PROVIDER VISIT (OUTPATIENT)
Dept: CARDIOLOGY | Facility: MEDICAL CENTER | Age: 76
End: 2021-06-02
Payer: MEDICARE

## 2021-06-02 ENCOUNTER — TELEPHONE (OUTPATIENT)
Dept: CARDIOLOGY | Facility: MEDICAL CENTER | Age: 76
End: 2021-06-02

## 2021-06-02 VITALS
OXYGEN SATURATION: 97 % | SYSTOLIC BLOOD PRESSURE: 110 MMHG | HEIGHT: 61 IN | HEART RATE: 70 BPM | DIASTOLIC BLOOD PRESSURE: 62 MMHG | WEIGHT: 128 LBS | BODY MASS INDEX: 24.17 KG/M2

## 2021-06-02 VITALS
RESPIRATION RATE: 19 BRPM | OXYGEN SATURATION: 97 % | HEIGHT: 61 IN | SYSTOLIC BLOOD PRESSURE: 104 MMHG | BODY MASS INDEX: 24.55 KG/M2 | DIASTOLIC BLOOD PRESSURE: 68 MMHG | HEART RATE: 84 BPM | WEIGHT: 130 LBS

## 2021-06-02 DIAGNOSIS — Z79.899 LONG TERM CURRENT USE OF ANTIARRHYTHMIC MEDICAL THERAPY: ICD-10-CM

## 2021-06-02 DIAGNOSIS — I25.10 CORONARY ARTERY DISEASE, NON-OCCLUSIVE: ICD-10-CM

## 2021-06-02 DIAGNOSIS — E78.00 HYPERCHOLESTEREMIA: ICD-10-CM

## 2021-06-02 DIAGNOSIS — I35.0 MILD AORTIC STENOSIS: ICD-10-CM

## 2021-06-02 DIAGNOSIS — R06.09 DOE (DYSPNEA ON EXERTION): ICD-10-CM

## 2021-06-02 DIAGNOSIS — I35.1 MILD AORTIC REGURGITATION: ICD-10-CM

## 2021-06-02 DIAGNOSIS — R63.5 WEIGHT GAIN: ICD-10-CM

## 2021-06-02 DIAGNOSIS — I47.19 PAT (PAROXYSMAL ATRIAL TACHYCARDIA) (HCC): ICD-10-CM

## 2021-06-02 LAB — TREADMILL STRESS: NORMAL

## 2021-06-02 PROCEDURE — 93015 CV STRESS TEST SUPVJ I&R: CPT | Performed by: INTERNAL MEDICINE

## 2021-06-02 PROCEDURE — 99214 OFFICE O/P EST MOD 30 MIN: CPT | Performed by: INTERNAL MEDICINE

## 2021-06-02 RX ORDER — LIFITEGRAST 50 MG/ML
SOLUTION/ DROPS OPHTHALMIC
COMMUNITY
Start: 2021-04-29

## 2021-06-02 ASSESSMENT — ENCOUNTER SYMPTOMS
LOSS OF CONSCIOUSNESS: 0
PALPITATIONS: 0
DIZZINESS: 0
MYALGIAS: 0
COUGH: 0
SHORTNESS OF BREATH: 1

## 2021-06-02 ASSESSMENT — FIBROSIS 4 INDEX
FIB4 SCORE: 0.95
FIB4 SCORE: 0.95

## 2021-06-02 NOTE — TELEPHONE ENCOUNTER
Called and spoke with the patient.  I spoke with Dr. Vela who stated it was safe to temporarily discontinue metoprolol at most for 2 weeks to see if she symptomatically improves.  The patient will titrate off metoprolol going to 12.5 mg daily for 1 week and if symptomatically improves we will continue at that level if not she will stop it for a week and depending on her symptoms she will notify me immediately.

## 2021-06-02 NOTE — PROGRESS NOTES
"Chief Complaint   Patient presents with   • Palpitations       Subjective:   Shaniqua Solis is a 73 y.o. female who presents today for follow-up evaluation of CAD, palpitations, PAT, CAD, mild aortic regurgitation with nonsustained VT and hyperlipidemia.    Since 3/26/2021 appointment with Dr. Eugene Vela the patient continues to have MATAMOROS  with climbing stairs, walking on an incline or on any level while carrying any object i.e. a heavy purse.  Her MATAMOROS began in 7/2020.  She is subsequently been seen by Dr. Vela, Dr. Guillory and JAYNE Alberto.  Dr. Charmaine Guillory MD pulmonologist found no pulmonary cause with unremarkable PFTs and and 6-minute walk.  Dr. Vela added Aldactone without improvement and arranged treadmill stress test today.  She continues on flecainide and low-dose metoprolol with no recurrent PAT.  Additional evaluation included normal MPI 11/2020.  Echocardiogram 12/2020 showed EF 70%, moderate LVH (same 2017), mild AS,, mild AI, mild MR, grade 2 diastolic dysfunction with RVSP of 37 mmHg.  Normal CXR.    She denies angina pectoris with her MATAMOROS, PND or lower extremity edema.  She has gained weight over the past year that is bothered her and feels that may be contributing to her dyspnea.    No history of hypertension.  No diabetes mellitus.    Past Medical History:   Diagnosis Date   • Anesthesia     \"couldn't open eye or talk\"   • Arrhythmia     PSVT   • CAD (coronary artery disease) 11/10/2010   • Cardiac arrhythmia    • Chickenpox    • Crohns disease    • Diarrhea    • Dizziness 2/2/2010   • GERD (gastroesophageal reflux disease)    • Heart burn    • Heartburn    • High cholesterol    • Hypercholesteremia 11/10/2010   • Indigestion    • Influenza    • Mumps    • Murmur 11/10/2010   • Osteoporosis    • Palpitations 11/10/2010   • Paroxysmal supraventricular tachycardia (HCC) 11/10/2010   • Pneumonia    • Shortness of breath    • Tonsillitis    • Unspecified cataract     IOL OD     Past Surgical " History:   Procedure Laterality Date   • KNEE ARTHROSCOPY Left 2017    Procedure: KNEE ARTHROSCOPY;  Surgeon: Al Heredia M.D.;  Location: SURGERY [facility];  Service:    • MEDIAL MENISCECTOMY Left 2017    Procedure: MEDIAL MENISCECTOMY - PARTIAL, MUIR;  Surgeon: Al Heredia M.D.;  Location: SURGERY [facility];  Service:    • CATARACT PHACO WITH IOL Right 2015    Procedure: CATARACT PHACO WITH IOL;  Surgeon: David Llanos M.D.;  Location: SURGERY [facility];  Service:    • ABDOMINOPLASTY      and face lift   • INGUINAL HERNIA REPAIR Right    • CARPAL TUNNEL RELEASE     • SALPINGOSTOMY Bilateral     repair of tubes   • ARTHROSCOPY, KNEE     • PRIMARY C SECTION  ,      Family History   Problem Relation Age of Onset   • Cancer Mother         Breast   • Heart Attack Father          at age 83 of heart attack.   • Heart Attack Brother         heart attack at age 41 and bypass surgery   • Heart Attack Brother         bypass surgery at age 62   • Cancer Maternal Aunt         Breast     Social History     Socioeconomic History   • Marital status:      Spouse name: Not on file   • Number of children: Not on file   • Years of education: Not on file   • Highest education level: Not on file   Occupational History   • Occupation:      Employer: OTHER   Tobacco Use   • Smoking status: Never Smoker   • Smokeless tobacco: Never Used   Vaping Use   • Vaping Use: Never used   Substance and Sexual Activity   • Alcohol use: Yes     Comment: Rarely   • Drug use: Never   • Sexual activity: Not on file   Other Topics Concern   • Not on file   Social History Narrative   • Not on file     Social Determinants of Health     Financial Resource Strain:    • Difficulty of Paying Living Expenses:    Food Insecurity:    • Worried About Running Out of Food in the Last Year:    • Ran Out of Food in the Last Year:    Transportation Needs:    • Lack of  "Transportation (Medical):    • Lack of Transportation (Non-Medical):    Physical Activity:    • Days of Exercise per Week:    • Minutes of Exercise per Session:    Stress:    • Feeling of Stress :    Social Connections:    • Frequency of Communication with Friends and Family:    • Frequency of Social Gatherings with Friends and Family:    • Attends [employer] Services:    • Active Member of Clubs or Organizations:    • Attends Club or Organization Meetings:    • Marital Status:    Intimate Partner Violence:    • Fear of Current or Ex-Partner:    • Emotionally Abused:    • Physically Abused:    • Sexually Abused:      No Known Allergies  Outpatient Encounter Medications as of 6/2/2021   Medication Sig Dispense Refill   • XIIDRA 5 % Solution      • spironolactone (ALDACTONE) 25 MG Tab TAKE 1 TABLET BY MOUTH DAILY 100 tablet 2   • flecainide (TAMBOCOR) 50 MG tablet TAKE ONE TABLET BY MOUTH TWICE A  Tab 1   • simvastatin (ZOCOR) 40 MG Tab TAKE ONE TABLET BY MOUTH AT BEDTIME 100 Tab 3   • metoprolol SR (TOPROL XL) 25 MG TABLET SR 24 HR Take 1 Tab by mouth every day. 100 Tab 3   • pantoprazole (PROTONIX) 40 MG Tablet Delayed Response Take 40 mg by mouth every day.     • aspirin 81 MG tablet Take 81 mg by mouth every evening.       No facility-administered encounter medications on file as of 6/2/2021.     Review of Systems   Respiratory: Positive for shortness of breath. Negative for cough.    Cardiovascular: Negative for chest pain and palpitations.   Musculoskeletal: Negative for myalgias.   Neurological: Negative for dizziness and loss of consciousness.        Objective:   /68 (BP Location: Left arm, Patient Position: Sitting, BP Cuff Size: Adult)   Pulse 84   Resp 19   Ht 1.549 m (5' 1\")   Wt 59 kg (130 lb)   SpO2 97%   BMI 24.56 kg/m²     Physical Exam   Constitutional: She is oriented to person, place, and time. She appears well-developed. No distress.   Neck: No JVD present.   Cardiovascular: " Normal rate and regular rhythm. Exam reveals no gallop and no friction rub.   Murmur heard.  Pulmonary/Chest: Effort normal and breath sounds normal. No respiratory distress. She has no wheezes. She has no rales.   Abdominal: Soft. She exhibits no distension and no mass. There is no abdominal tenderness.   Neurological: She is alert and oriented to person, place, and time.   Skin: Skin is warm and dry.   Psychiatric: Her behavior is normal.       04/04/2012 Lab: Cholesterol 178. Triglycerides 146. HDL 49. .     ECHOCARDIOGRAM 11/29/2010  EF 84%. Mild LVH. 1-2+ AR. 1+ MR. PSP 22-27 mmHg.     ECHOCARDIOGRAM 01/05/2017  Normal left ventricular systolic function.  Moderate concentric left ventricular hypertrophy.  Left ventricular ejection fraction is visually estimated to be 70%.  Aortic sclerosis without stenosis.  Mild aortic insufficiency.  Right heart pressures are normal.  No prior study is available for comparison.    ECHOCARDIOGRAM 12/28/2018  Mild concentric left ventricular hypertrophy.  Left ventricular ejection fraction is visually estimated to be 70%.  Normal regional wall motion.  Grade II diastolic dysfunction.  Mild aortic insufficiency.  Mild mitral regurgitation..  Estimated right ventricular systolic pressure  is 40  mmHg.    ECHOCARDIOGRAM 12/11/2020  Normal left ventricular chamber size.  Left ventricular ejection fraction is visually estimated to be 70%.  Moderate eccentric left ventricular hypertrophy.  Grade II diastolic dysfunction.  Mild mitral regurgitation.  Mild aortic stenosis. Vmax is 2.4  m/s.  Normal right ventricular size and systolic function.  Normal inferior vena cava size and inspiratory collapse.  Estimated right ventricular systolic pressure  is 37 mmHg.  Compared to the prior study done -  there has been development of mils   aortic stenosis and an increase in LV thickness.    MPI 02/17/2012  1)   Normal exercise Cardiolite stress test with no evidence of  ischemia or  infarction.   2)  Normal left ventricular systolic function with a calculated  ejection fraction of 86%.   3)  No chest pain reported, and no ischemic EKG changes seen.   4)  Average exercise tolerance.   5)  No prior nuclear stress test available for comparison.      MPI 01/05/2017  Normal myocardial perfusion with no ischemia.   Normal left ventricular wall motion.  LV ejection fraction = 73%.   ECG INTERPRETATION   Negative stress ECG for ischemia.    MPI 11/3/2020   No evidence of significant jeopardized viable myocardium or prior myocardial infarction.   Normal left ventricular size, ejection fraction, and wall motion.   ECG INTERPRETATION   Negative stress ECG for ischemia.    01/23/2003 CARDIAC CATHETERIZATION  EF 67%.  50-70% diagonal stenosis.  Treated medically.     11/02/2016 HOLTER MONITOR  Normal sinus rhythm.  Multiple episodes of SVT.  Nonsustained VT.    Assessment:     1. MATAMOROS (dyspnea on exertion)     2. Mild aortic regurgitation     3. Mild aortic stenosis     4. Coronary artery disease, non-occlusive  TSH+FREE T4    Comp Metabolic Panel   5. PAT (paroxysmal atrial tachycardia) ([facility])     6. Weight gain  REFERRAL TO [facility] IMPROVEMENT PROGRAMS (HIP)   7. Hypercholesteremia  LIPID PANEL    CBC WITHOUT DIFFERENTIAL       Medical Decision Making:  Today's Assessment / Status / Plan:     Assessment  1.  MATAMOROS, chronic 7/2020  2.  PAT.   3.  CAD. Diagonal stenosis 2003.  4.  Aortic stenosis, mild.  5.  Aortic regurgitation.  Mild.  6.  Hypercholesterolemia.  7.  Nonsustained ventricular tachycardia.     Recommendations and Discussion  1.  The patient has developed persistent MATAMOROS for the past year exclusively while walking upstairs or on an incline and while carrying heavy objects.  Her treadmill stress test today shows no ischemia with reasonable exercise tolerance of 7.0 METS but symptomatic with MATAMOROS with blunted heart rate due to beta-blockers.  Potential etiologies for her MATAMOROS include either  exclusively or combination of age-related exercise deconditioning, developing diastolic dysfunction (despite echocardiogram being unchanged with studies of 2017 and 2018 reverse), medications with beta-blockers essential for management of PAT an relative weight gain.  Other potential etiology in view of moderate LVH in the absence of hypertension suggests possible early infiltrative process.  (Personally reviewed all of her previous echocardiogram images reverse (  2.  Short of invasive testing including right and left heart cardiac catheterization to assess hemodynamics and coronary anatomy will speak with Dr. Vela regarding safety of temporary cessation of beta-blockers.  3.  The patient requests referral for nutritional consultation for weight loss.  4.  Updated comprehensive laboratory tests will be obtained.  5.  RTC 3 months.

## 2021-06-16 ENCOUNTER — OFFICE VISIT (OUTPATIENT)
Dept: HEALTH INFORMATION MANAGEMENT | Facility: MEDICAL CENTER | Age: 76
End: 2021-06-16
Payer: MEDICARE

## 2021-06-16 VITALS — BODY MASS INDEX: 24.28 KG/M2 | WEIGHT: 128.5 LBS

## 2021-06-16 DIAGNOSIS — I47.19 PAT (PAROXYSMAL ATRIAL TACHYCARDIA) (HCC): ICD-10-CM

## 2021-06-16 DIAGNOSIS — R63.5 WEIGHT GAIN: ICD-10-CM

## 2021-06-16 PROCEDURE — 97802 MEDICAL NUTRITION INDIV IN: CPT | Performed by: DIETITIAN, REGISTERED

## 2021-06-16 ASSESSMENT — FIBROSIS 4 INDEX: FIB4 SCORE: 0.95

## 2021-06-16 NOTE — PROGRESS NOTES
6/16/2021    Zach Villalobos M.D.  75 y.o.   Time in/out: 9:00-10:00    Anthropometrics/Objective  Vitals:    06/16/21 1437   Weight: 58.3 kg (128 lb 8 oz)       Body mass index is 24.28 kg/m².    Stated Goal Weight: 118 lbs  Estimated Caloric needs 1015 Kcal (MSJ)   See comprehensive patient history form for further information     Subjective:  - stated diverticulosis  - menopause ended about 3 years ago  - 2 years ago hard time breathing   - stated diarrhea for 2 years   - takes imodium every 3 days to ease this  - walks 2-3x a week and bowls  - happy hour (1drink 2-3x a week)   - undisrupted sleep usually  - boyfriend in [facility]  - weight gain of about 10 lbs    Nutrition Diagnosis (PES Statement)  Problem (Nutrition diagnosis)  Clinical: diarrhea    Etiology(Addresses the cause,contributing factors)  Chage in GI tract function/structure    Signs/Symptoms (Address observations and stated info: subjective and objective data)  diarrhea      Client history:  Condition(s) associated with a diagnosis or treatment (specify) CAD, hypercholesterolemia, GERD, Diverticulosis stated    Biochemical data, medical test and procedures  Lab Results   Component Value Date/Time    HBA1C 5.8 (H) 06/24/2020 08:22 AM   @  No results found for: POCGLUCOSE  Lab Results   Component Value Date/Time    CHOLSTRLTOT 209 (H) 06/24/2020 08:22 AM     (H) 06/24/2020 08:22 AM    HDL 49 06/24/2020 08:22 AM    TRIGLYCERIDE 204 (H) 06/24/2020 08:22 AM         Nutrition Intervention    Meal and Snack  Recommend a general/healthful diet potentially high or low fiber depending on state of colon      Monitoring & Evaluation Plan    Behavioral-Environmental:  Behavior: reach out to PCP for referral for colonoscopy    Food / Nutrient Intake:  Food intake: balanced plate at this time    Physical Signs / Symptoms:  Other: reduction of diarrhea    Assessment Notes:  Vaishali would like to work on her health goals towards overall better  health including weight loss and reduction of diarrhea symptoms. Did not see the diverticulosis dx in her chart but discussed this briefly.  She states she has not discussed the diarrhea with her PCP. Has been taking imodium for 2 years to ease this. We discussed that checking in with imaging for colon would be of benefit as nutrition therapy would differ depending on her colon state. Discussed with Vaishali that internal inflammation could be contributing to weight retention/gain. She was able to identify that she has been putting off her colon health. We also discussed the possibility of some food allergies. She denies family history of AI diseases. Suggested she ask her PCP for an DAVI test as well to help identify potential gluten sensitivity. She reports no signs or symptoms other than diarrhea. We discussed the benefit of checking in with her colon before advancing with diet therapy to which she agreed. At next visit suggest providing nutrition therapy for appropriate diagnosis associated with diarrhea.    Follow up: 4-6 weeks

## 2021-06-18 ENCOUNTER — PATIENT MESSAGE (OUTPATIENT)
Dept: HEALTH INFORMATION MANAGEMENT | Facility: OTHER | Age: 76
End: 2021-06-18

## 2021-06-21 RX ORDER — FLECAINIDE ACETATE 50 MG/1
TABLET ORAL
Qty: 180 TABLET | Refills: 1 | Status: SHIPPED | OUTPATIENT
Start: 2021-06-21 | End: 2021-12-10

## 2021-06-29 ENCOUNTER — HOSPITAL ENCOUNTER (OUTPATIENT)
Dept: LAB | Facility: MEDICAL CENTER | Age: 76
End: 2021-06-29
Attending: FAMILY MEDICINE
Payer: MEDICARE

## 2021-06-29 ENCOUNTER — PATIENT OUTREACH (OUTPATIENT)
Dept: HEALTH INFORMATION MANAGEMENT | Facility: OTHER | Age: 76
End: 2021-06-29

## 2021-06-29 LAB
ALBUMIN SERPL BCP-MCNC: 4.1 G/DL (ref 3.2–4.9)
ALBUMIN/GLOB SERPL: 1.6 G/DL
ALP SERPL-CCNC: 67 U/L (ref 30–99)
ALT SERPL-CCNC: 16 U/L (ref 2–50)
ANION GAP SERPL CALC-SCNC: 10 MMOL/L (ref 7–16)
APPEARANCE UR: CLEAR
AST SERPL-CCNC: 23 U/L (ref 12–45)
BACTERIA #/AREA URNS HPF: NEGATIVE /HPF
BASOPHILS # BLD AUTO: 1.1 % (ref 0–1.8)
BASOPHILS # BLD: 0.06 K/UL (ref 0–0.12)
BILIRUB SERPL-MCNC: 0.6 MG/DL (ref 0.1–1.5)
BILIRUB UR QL STRIP.AUTO: NEGATIVE
BUN SERPL-MCNC: 11 MG/DL (ref 8–22)
CALCIUM SERPL-MCNC: 9.1 MG/DL (ref 8.5–10.5)
CHLORIDE SERPL-SCNC: 99 MMOL/L (ref 96–112)
CHOLEST SERPL-MCNC: 167 MG/DL (ref 100–199)
CO2 SERPL-SCNC: 24 MMOL/L (ref 20–33)
COLOR UR: YELLOW
CREAT SERPL-MCNC: 0.67 MG/DL (ref 0.5–1.4)
EOSINOPHIL # BLD AUTO: 0.16 K/UL (ref 0–0.51)
EOSINOPHIL NFR BLD: 2.9 % (ref 0–6.9)
EPI CELLS #/AREA URNS HPF: NEGATIVE /HPF
ERYTHROCYTE [DISTWIDTH] IN BLOOD BY AUTOMATED COUNT: 42.4 FL (ref 35.9–50)
ERYTHROCYTE [SEDIMENTATION RATE] IN BLOOD BY WESTERGREN METHOD: 5 MM/HOUR (ref 0–25)
EST. AVERAGE GLUCOSE BLD GHB EST-MCNC: 123 MG/DL
FASTING STATUS PATIENT QL REPORTED: NORMAL
GLOBULIN SER CALC-MCNC: 2.6 G/DL (ref 1.9–3.5)
GLUCOSE SERPL-MCNC: 119 MG/DL (ref 65–99)
GLUCOSE UR STRIP.AUTO-MCNC: NEGATIVE MG/DL
HBA1C MFR BLD: 5.9 % (ref 4–5.6)
HCT VFR BLD AUTO: 40.4 % (ref 37–47)
HDLC SERPL-MCNC: 45 MG/DL
HGB BLD-MCNC: 13.5 G/DL (ref 12–16)
HYALINE CASTS #/AREA URNS LPF: NORMAL /LPF
IMM GRANULOCYTES # BLD AUTO: 0.02 K/UL (ref 0–0.11)
IMM GRANULOCYTES NFR BLD AUTO: 0.4 % (ref 0–0.9)
KETONES UR STRIP.AUTO-MCNC: NEGATIVE MG/DL
LDLC SERPL CALC-MCNC: 101 MG/DL
LEUKOCYTE ESTERASE UR QL STRIP.AUTO: NEGATIVE
LYMPHOCYTES # BLD AUTO: 1.64 K/UL (ref 1–4.8)
LYMPHOCYTES NFR BLD: 29.7 % (ref 22–41)
MCH RBC QN AUTO: 31 PG (ref 27–33)
MCHC RBC AUTO-ENTMCNC: 33.4 G/DL (ref 33.6–35)
MCV RBC AUTO: 92.9 FL (ref 81.4–97.8)
MICRO URNS: ABNORMAL
MONOCYTES # BLD AUTO: 0.54 K/UL (ref 0–0.85)
MONOCYTES NFR BLD AUTO: 9.8 % (ref 0–13.4)
NEUTROPHILS # BLD AUTO: 3.11 K/UL (ref 2–7.15)
NEUTROPHILS NFR BLD: 56.1 % (ref 44–72)
NITRITE UR QL STRIP.AUTO: NEGATIVE
NRBC # BLD AUTO: 0 K/UL
NRBC BLD-RTO: 0 /100 WBC
PH UR STRIP.AUTO: 7.5 [PH] (ref 5–8)
PLATELET # BLD AUTO: 256 K/UL (ref 164–446)
PMV BLD AUTO: 10 FL (ref 9–12.9)
POTASSIUM SERPL-SCNC: 4.8 MMOL/L (ref 3.6–5.5)
PROT SERPL-MCNC: 6.7 G/DL (ref 6–8.2)
PROT UR QL STRIP: NEGATIVE MG/DL
RBC # BLD AUTO: 4.35 M/UL (ref 4.2–5.4)
RBC # URNS HPF: NORMAL /HPF
RBC UR QL AUTO: ABNORMAL
SODIUM SERPL-SCNC: 133 MMOL/L (ref 135–145)
SP GR UR STRIP.AUTO: 1.01
TRIGL SERPL-MCNC: 106 MG/DL (ref 0–149)
TSH SERPL DL<=0.005 MIU/L-ACNC: 1.09 UIU/ML (ref 0.38–5.33)
UROBILINOGEN UR STRIP.AUTO-MCNC: 0.2 MG/DL
WBC # BLD AUTO: 5.5 K/UL (ref 4.8–10.8)
WBC #/AREA URNS HPF: NORMAL /HPF

## 2021-06-29 PROCEDURE — 80053 COMPREHEN METABOLIC PANEL: CPT

## 2021-06-29 PROCEDURE — 86038 ANTINUCLEAR ANTIBODIES: CPT

## 2021-06-29 PROCEDURE — 83036 HEMOGLOBIN GLYCOSYLATED A1C: CPT

## 2021-06-29 PROCEDURE — 36415 COLL VENOUS BLD VENIPUNCTURE: CPT

## 2021-06-29 PROCEDURE — 85652 RBC SED RATE AUTOMATED: CPT

## 2021-06-29 PROCEDURE — 81001 URINALYSIS AUTO W/SCOPE: CPT

## 2021-06-29 PROCEDURE — 87045 FECES CULTURE AEROBIC BACT: CPT

## 2021-06-29 PROCEDURE — 87899 AGENT NOS ASSAY W/OPTIC: CPT

## 2021-06-29 PROCEDURE — 83516 IMMUNOASSAY NONANTIBODY: CPT

## 2021-06-29 PROCEDURE — 80061 LIPID PANEL: CPT

## 2021-06-29 PROCEDURE — 84443 ASSAY THYROID STIM HORMONE: CPT

## 2021-06-29 PROCEDURE — 85025 COMPLETE CBC W/AUTO DIFF WBC: CPT

## 2021-06-29 PROCEDURE — 83630 LACTOFERRIN FECAL (QUAL): CPT

## 2021-07-01 LAB
E COLI SXT1+2 STL IA: NORMAL
GLIADIN IGA SER IA-ACNC: 4 UNITS (ref 0–19)
GLIADIN IGG SER IA-ACNC: 2 UNITS (ref 0–19)
NUCLEAR IGG SER QL IA: NORMAL
SIGNIFICANT IND 70042: NORMAL
SITE SITE: NORMAL
SOURCE SOURCE: NORMAL
TTG IGA SER IA-ACNC: <2 U/ML (ref 0–3)
TTG IGG SER IA-ACNC: 2 U/ML (ref 0–5)

## 2021-07-02 LAB
BACTERIA STL CULT: NORMAL
C JEJUNI+C COLI AG STL QL: NORMAL
E COLI SXT1+2 STL IA: NORMAL
LACTOFERRIN STL QL IA: NEGATIVE
SIGNIFICANT IND 70042: NORMAL
SITE SITE: NORMAL
SOURCE SOURCE: NORMAL

## 2021-08-31 ENCOUNTER — OFFICE VISIT (OUTPATIENT)
Dept: CARDIOLOGY | Facility: MEDICAL CENTER | Age: 76
End: 2021-08-31
Payer: MEDICARE

## 2021-08-31 VITALS
OXYGEN SATURATION: 95 % | HEIGHT: 61 IN | WEIGHT: 124.4 LBS | RESPIRATION RATE: 14 BRPM | HEART RATE: 76 BPM | DIASTOLIC BLOOD PRESSURE: 72 MMHG | BODY MASS INDEX: 23.49 KG/M2 | SYSTOLIC BLOOD PRESSURE: 124 MMHG

## 2021-08-31 DIAGNOSIS — I25.10 CORONARY ARTERY DISEASE, NON-OCCLUSIVE: ICD-10-CM

## 2021-08-31 DIAGNOSIS — I35.1 MILD AORTIC REGURGITATION: ICD-10-CM

## 2021-08-31 DIAGNOSIS — I47.19 PAT (PAROXYSMAL ATRIAL TACHYCARDIA) (HCC): ICD-10-CM

## 2021-08-31 DIAGNOSIS — E78.00 HYPERCHOLESTEREMIA: ICD-10-CM

## 2021-08-31 DIAGNOSIS — I51.7 LEFT VENTRICULAR HYPERTROPHY: ICD-10-CM

## 2021-08-31 DIAGNOSIS — I35.0 MILD AORTIC STENOSIS: ICD-10-CM

## 2021-08-31 PROCEDURE — 99214 OFFICE O/P EST MOD 30 MIN: CPT | Performed by: INTERNAL MEDICINE

## 2021-08-31 RX ORDER — ALENDRONATE SODIUM 35 MG/1
TABLET ORAL
COMMUNITY
Start: 2021-08-18

## 2021-08-31 ASSESSMENT — FIBROSIS 4 INDEX: FIB4 SCORE: 1.71

## 2021-08-31 ASSESSMENT — ENCOUNTER SYMPTOMS
PALPITATIONS: 0
LOSS OF CONSCIOUSNESS: 0
DIZZINESS: 0
SHORTNESS OF BREATH: 0
COUGH: 0
MYALGIAS: 0

## 2021-08-31 NOTE — PROGRESS NOTES
"Chief Complaint   Patient presents with   • Coronary Artery Disease   • Palpitations   • Supraventricular Tachycardia (SVT)     F/V Dx: Paroxysmal supraventricular tachycardia (HCC)       Subjective:   Shaniqua Solis is a 73 y.o. female who presents today for follow-up evaluation of CAD, palpitations, PAT, CAD, mild aortic regurgitation with nonsustained VT and hyperlipidemia.    Since 6/2/2021 appointment the patient said no cardiac problems or symptoms.  She stopped metoprolol and her SOB immediately resolved.  She continues flecainide.  Under a lot of stress related to a close acquaintance who is having dementia problems for which she is a sole provider and the problems that her son struggling with.  She is lost some weight on the keto diet.    No history of hypertension.  No diabetes mellitus.    Past Medical History:   Diagnosis Date   • Anesthesia     \"couldn't open eye or talk\"   • Arrhythmia     PSVT   • CAD (coronary artery disease) 11/10/2010   • Cardiac arrhythmia    • Chickenpox    • Crohns disease    • Diarrhea    • Dizziness 2/2/2010   • GERD (gastroesophageal reflux disease)    • Heart burn    • Heartburn    • High cholesterol    • Hypercholesteremia 11/10/2010   • Indigestion    • Influenza    • Mumps    • Murmur 11/10/2010   • Osteoporosis    • Palpitations 11/10/2010   • Paroxysmal supraventricular tachycardia (HCC) 11/10/2010   • Pneumonia    • Shortness of breath    • Tonsillitis    • Unspecified cataract     IOL OD     Past Surgical History:   Procedure Laterality Date   • KNEE ARTHROSCOPY Left 7/6/2017    Procedure: KNEE ARTHROSCOPY;  Surgeon: Al Heredia M.D.;  Location: SURGERY [facility];  Service:    • MEDIAL MENISCECTOMY Left 7/6/2017    Procedure: MEDIAL MENISCECTOMY - PARTIAL, MUIR;  Surgeon: Al Heredia M.D.;  Location: SURGERY [facility];  Service:    • CATARACT PHACO WITH IOL Right 6/22/2015    Procedure: CATARACT PHACO WITH IOL;  Surgeon: David Llanos, " M.D.;  Location: [facility];  Service:    • ABDOMINOPLASTY  2015    and face lift   • INGUINAL HERNIA REPAIR Right 2014   • CARPAL TUNNEL RELEASE  2012   • SALPINGOSTOMY Bilateral 1975    repair of tubes   • ARTHROSCOPY, KNEE     • PRIMARY C SECTION  ,      Family History   Problem Relation Age of Onset   • Cancer Mother         Breast   • Heart Attack Father          at age 83 of heart attack.   • Heart Attack Brother         heart attack at age 41 and bypass surgery   • Heart Attack Brother         bypass surgery at age 62   • Cancer Maternal Aunt         Breast     Social History     Socioeconomic History   • Marital status:      Spouse name: Not on file   • Number of children: Not on file   • Years of education: Not on file   • Highest education level: Not on file   Occupational History   • Occupation:      Employer: OTHER   Tobacco Use   • Smoking status: Never Smoker   • Smokeless tobacco: Never Used   Vaping Use   • Vaping Use: Never used   Substance and Sexual Activity   • Alcohol use: Yes     Comment: Rarely   • Drug use: Never   • Sexual activity: Not on file   Other Topics Concern   • Not on file   Social History Narrative   • Not on file     Social Determinants of Health     Financial Resource Strain:    • Difficulty of Paying Living Expenses:    Food Insecurity:    • Worried About Running Out of Food in the Last Year:    • Ran Out of Food in the Last Year:    Transportation Needs:    • Lack of Transportation (Medical):    • Lack of Transportation (Non-Medical):    Physical Activity:    • Days of Exercise per Week:    • Minutes of Exercise per Session:    Stress:    • Feeling of Stress :    Social Connections:    • Frequency of Communication with Friends and Family:    • Frequency of Social Gatherings with Friends and Family:    • Attends Hindu Services:    • Active Member of Clubs or Organizations:    • Attends Club or Organization Meetings:    •  "Marital Status:    Intimate Partner Violence:    • Fear of Current or Ex-Partner:    • Emotionally Abused:    • Physically Abused:    • Sexually Abused:      No Known Allergies  Outpatient Encounter Medications as of 8/31/2021   Medication Sig Dispense Refill   • alendronate (FOSAMAX) 35 MG tablet      • flecainide (TAMBOCOR) 50 MG tablet TAKE ONE TABLET BY MOUTH TWICE A  tablet 1   • XIIDRA 5 % Solution      • spironolactone (ALDACTONE) 25 MG Tab TAKE 1 TABLET BY MOUTH DAILY 100 tablet 2   • simvastatin (ZOCOR) 40 MG Tab TAKE ONE TABLET BY MOUTH AT BEDTIME 100 Tab 3   • pantoprazole (PROTONIX) 40 MG Tablet Delayed Response Take 40 mg by mouth every day.     • aspirin 81 MG tablet Take 81 mg by mouth every evening.     • [DISCONTINUED] metoprolol SR (TOPROL XL) 25 MG TABLET SR 24 HR Take 1 Tab by mouth every day. (Patient not taking: Reported on 8/31/2021) 100 Tab 3     No facility-administered encounter medications on file as of 8/31/2021.     Review of Systems   Respiratory: Negative for cough and shortness of breath.    Cardiovascular: Negative for chest pain and palpitations.   Musculoskeletal: Negative for myalgias.   Neurological: Negative for dizziness and loss of consciousness.        Objective:   /72 (BP Location: Left arm, Patient Position: Sitting, BP Cuff Size: Adult)   Pulse 76   Resp 14   Ht 1.549 m (5' 1\")   Wt 56.4 kg (124 lb 6.4 oz)   SpO2 95%   BMI 23.51 kg/m²     Physical Exam   Constitutional: She is oriented to person, place, and time. She appears well-developed. No distress.   Neck: No JVD present.   Cardiovascular: Normal rate and regular rhythm. Exam reveals no gallop and no friction rub.   Murmur heard.  Pulmonary/Chest: Effort normal and breath sounds normal. No respiratory distress. She has no wheezes. She has no rales.   Abdominal: Soft. She exhibits no distension and no mass. There is no abdominal tenderness.   Neurological: She is alert and oriented to person, place, " and time.   Skin: Skin is warm and dry.   Psychiatric: Her behavior is normal.       04/04/2012 Lab: Cholesterol 178. Triglycerides 146. HDL 49. .     ECHOCARDIOGRAM 11/29/2010  EF 84%. Mild LVH. 1-2+ AR. 1+ MR. PSP 22-27 mmHg.     ECHOCARDIOGRAM 01/05/2017  Normal left ventricular systolic function.  Moderate concentric left ventricular hypertrophy.  Left ventricular ejection fraction is visually estimated to be 70%.  Aortic sclerosis without stenosis.  Mild aortic insufficiency.  Right heart pressures are normal.  No prior study is available for comparison.    ECHOCARDIOGRAM 12/28/2018  Mild concentric left ventricular hypertrophy.  Left ventricular ejection fraction is visually estimated to be 70%.  Normal regional wall motion.  Grade II diastolic dysfunction.  Mild aortic insufficiency.  Mild mitral regurgitation..  Estimated right ventricular systolic pressure  is 40  mmHg.    ECHOCARDIOGRAM 12/11/2020  Normal left ventricular chamber size.  Left ventricular ejection fraction is visually estimated to be 70%.  Moderate eccentric left ventricular hypertrophy.  Grade II diastolic dysfunction.  Mild mitral regurgitation.  Mild aortic stenosis. Vmax is 2.4  m/s.  Normal right ventricular size and systolic function.  Normal inferior vena cava size and inspiratory collapse.  Estimated right ventricular systolic pressure  is 37 mmHg.  Compared to the prior study done -  there has been development of mils   aortic stenosis and an increase in LV thickness.    MPI 02/17/2012  1)   Normal exercise Cardiolite stress test with no evidence of  ischemia or infarction.   2)  Normal left ventricular systolic function with a calculated  ejection fraction of 86%.   3)  No chest pain reported, and no ischemic EKG changes seen.   4)  Average exercise tolerance.   5)  No prior nuclear stress test available for comparison.      MPI 01/05/2017  Normal myocardial perfusion with no ischemia.   Normal left ventricular wall motion.   LV ejection fraction = 73%.   ECG INTERPRETATION   Negative stress ECG for ischemia.    MPI 11/3/2020   No evidence of significant jeopardized viable myocardium or prior myocardial infarction.   Normal left ventricular size, ejection fraction, and wall motion.   ECG INTERPRETATION   Negative stress ECG for ischemia.    01/23/2003 CARDIAC CATHETERIZATION  EF 67%.  50-70% diagonal stenosis.  Treated medically.     11/02/2016 HOLTER MONITOR  Normal sinus rhythm.  Multiple episodes of SVT.  Nonsustained VT.    Assessment:     1. Coronary artery disease, non-occlusive     2. PAT (paroxysmal atrial tachycardia) (Former[facility])     3. Mild aortic stenosis  EC-ECHOCARDIOGRAM COMPLETE W/O CONT   4. Mild aortic regurgitation     5. Hypercholesteremia  LIPID PANEL   6. Left ventricular hypertrophy         Medical Decision Making:  Today's Assessment / Status / Plan:     Assessment  1.  PAT.   2.  CAD. Diagonal stenosis 2003.  3.  Aortic stenosis, mild.  4.  Aortic regurgitation.  Mild.  5.  Hypercholesterolemia.  6.  Nonsustained ventricular tachycardia.  7.  Left ventricular hypertrophy  8.  Beta-blocker induced SOB.     Recommendations and Discussion  1.  The patient's shortness of breath is resolved after stopping beta-blocker.  2.  Clinically the patient remained stable concerning her PAT, CAD, aortic valve disease and LVH.  3.  Discussed possible cardiac MRI to further evaluate the patient's LVH but decided to get a follow-up echocardiogram 12/2021 then decide.  4.  Reviewed lipid panel  will repeat in 12/2021 and adjust medications as indicated.  5.  RTC 4 months.

## 2021-12-09 ENCOUNTER — TELEPHONE (OUTPATIENT)
Dept: CARDIOLOGY | Facility: MEDICAL CENTER | Age: 76
End: 2021-12-09

## 2021-12-09 NOTE — TELEPHONE ENCOUNTER
Called patient in regards to standing lab slip ordered per . No reply, left voicemail to call back.

## 2021-12-10 DIAGNOSIS — I47.19 PAT (PAROXYSMAL ATRIAL TACHYCARDIA) (HCC): ICD-10-CM

## 2021-12-14 RX ORDER — FLECAINIDE ACETATE 50 MG/1
TABLET ORAL
Qty: 180 TABLET | Refills: 2 | Status: SHIPPED | OUTPATIENT
Start: 2021-12-14 | End: 2022-10-12 | Stop reason: SDUPTHER

## 2021-12-15 ENCOUNTER — HOSPITAL ENCOUNTER (OUTPATIENT)
Dept: CARDIOLOGY | Facility: MEDICAL CENTER | Age: 76
End: 2021-12-15
Attending: INTERNAL MEDICINE
Payer: MEDICARE

## 2021-12-15 DIAGNOSIS — I35.0 MILD AORTIC STENOSIS: ICD-10-CM

## 2021-12-15 PROCEDURE — 93306 TTE W/DOPPLER COMPLETE: CPT

## 2021-12-16 LAB
LV EJECT FRACT MOD 2C 99903: 66.12
LV EJECT FRACT MOD 4C 99902: 71.15
LV EJECT FRACT MOD BP 99901: 61.84

## 2021-12-16 PROCEDURE — 93306 TTE W/DOPPLER COMPLETE: CPT | Mod: 26 | Performed by: INTERNAL MEDICINE

## 2022-01-13 ENCOUNTER — PATIENT MESSAGE (OUTPATIENT)
Dept: HEALTH INFORMATION MANAGEMENT | Facility: OTHER | Age: 77
End: 2022-01-13
Payer: MEDICARE

## 2022-03-06 DIAGNOSIS — I51.89 DIASTOLIC DYSFUNCTION: ICD-10-CM

## 2022-03-06 DIAGNOSIS — E78.00 HYPERCHOLESTEREMIA: Chronic | ICD-10-CM

## 2022-03-08 DIAGNOSIS — I51.89 DIASTOLIC DYSFUNCTION: ICD-10-CM

## 2022-03-10 RX ORDER — SPIRONOLACTONE 25 MG/1
TABLET ORAL
Qty: 100 TABLET | Refills: 1 | OUTPATIENT
Start: 2022-03-10

## 2022-03-10 RX ORDER — SIMVASTATIN 40 MG
TABLET ORAL
Qty: 100 TABLET | Refills: 1 | Status: SHIPPED | OUTPATIENT
Start: 2022-03-10 | End: 2022-10-12 | Stop reason: SDUPTHER

## 2022-03-10 RX ORDER — SPIRONOLACTONE 25 MG/1
TABLET ORAL
Qty: 100 TABLET | Refills: 1 | Status: SHIPPED | OUTPATIENT
Start: 2022-03-10 | End: 2022-10-12 | Stop reason: SDUPTHER

## 2022-04-15 ENCOUNTER — TELEPHONE (OUTPATIENT)
Dept: HEALTH INFORMATION MANAGEMENT | Facility: OTHER | Age: 77
End: 2022-04-15
Payer: MEDICARE

## 2022-04-20 PROBLEM — R73.03 PREDIABETES: Status: ACTIVE | Noted: 2022-04-20

## 2022-04-20 PROBLEM — M85.80 OSTEOPENIA: Status: ACTIVE | Noted: 2022-04-20

## 2022-04-20 PROBLEM — I70.0 ATHEROSCLEROSIS OF ABDOMINAL AORTA (HCC): Status: ACTIVE | Noted: 2022-04-20

## 2022-05-24 ENCOUNTER — HOSPITAL ENCOUNTER (OUTPATIENT)
Dept: RADIOLOGY | Facility: MEDICAL CENTER | Age: 77
End: 2022-05-24
Attending: FAMILY MEDICINE
Payer: MEDICARE

## 2022-05-24 DIAGNOSIS — Z12.31 VISIT FOR SCREENING MAMMOGRAM: ICD-10-CM

## 2022-05-24 PROCEDURE — 77063 BREAST TOMOSYNTHESIS BI: CPT

## 2022-10-12 DIAGNOSIS — I51.89 DIASTOLIC DYSFUNCTION: ICD-10-CM

## 2022-10-14 RX ORDER — SPIRONOLACTONE 25 MG/1
25 TABLET ORAL DAILY
Qty: 100 TABLET | Refills: 0 | Status: SHIPPED | OUTPATIENT
Start: 2022-10-14 | End: 2022-11-05

## 2022-10-14 NOTE — TELEPHONE ENCOUNTER
Is the patient due for a refill? Yes    Was the patient seen the past year? Yes    Date of last office visit: 8/31/21    Does the patient have an upcoming appointment?  Yes   If yes, When? 10/27/22    Provider to refill:NICK    Does the patients insurance require a 100 day supply?  Yes

## 2022-10-27 ENCOUNTER — OFFICE VISIT (OUTPATIENT)
Dept: CARDIOLOGY | Facility: MEDICAL CENTER | Age: 77
End: 2022-10-27
Payer: MEDICARE

## 2022-10-27 VITALS
SYSTOLIC BLOOD PRESSURE: 108 MMHG | DIASTOLIC BLOOD PRESSURE: 58 MMHG | WEIGHT: 126 LBS | BODY MASS INDEX: 25.4 KG/M2 | RESPIRATION RATE: 20 BRPM | OXYGEN SATURATION: 97 % | HEIGHT: 59 IN | HEART RATE: 71 BPM

## 2022-10-27 DIAGNOSIS — I25.10 CORONARY ARTERY DISEASE INVOLVING NATIVE CORONARY ARTERY OF NATIVE HEART WITHOUT ANGINA PECTORIS: Chronic | ICD-10-CM

## 2022-10-27 DIAGNOSIS — I25.10 CORONARY ARTERY DISEASE, NON-OCCLUSIVE: ICD-10-CM

## 2022-10-27 DIAGNOSIS — I35.1 MILD AORTIC REGURGITATION: ICD-10-CM

## 2022-10-27 DIAGNOSIS — R73.03 PREDIABETES: ICD-10-CM

## 2022-10-27 DIAGNOSIS — R00.2 PALPITATIONS: Chronic | ICD-10-CM

## 2022-10-27 DIAGNOSIS — I47.10 PAROXYSMAL SUPRAVENTRICULAR TACHYCARDIA (HCC): Chronic | ICD-10-CM

## 2022-10-27 DIAGNOSIS — R06.09 DOE (DYSPNEA ON EXERTION): ICD-10-CM

## 2022-10-27 DIAGNOSIS — E78.00 HYPERCHOLESTEREMIA: Chronic | ICD-10-CM

## 2022-10-27 DIAGNOSIS — I35.0 MILD AORTIC STENOSIS: ICD-10-CM

## 2022-10-27 DIAGNOSIS — I70.0 ATHEROSCLEROSIS OF ABDOMINAL AORTA (HCC): ICD-10-CM

## 2022-10-27 DIAGNOSIS — I47.19 PAT (PAROXYSMAL ATRIAL TACHYCARDIA) (HCC): ICD-10-CM

## 2022-10-27 PROCEDURE — 99214 OFFICE O/P EST MOD 30 MIN: CPT | Performed by: INTERNAL MEDICINE

## 2022-10-27 RX ORDER — ESOMEPRAZOLE MAGNESIUM 40 MG/1
CAPSULE, DELAYED RELEASE ORAL DAILY
COMMUNITY
Start: 2022-09-14

## 2022-10-27 RX ORDER — ATORVASTATIN CALCIUM 40 MG/1
40 TABLET, FILM COATED ORAL
Qty: 30 TABLET | Refills: 11 | Status: SHIPPED | OUTPATIENT
Start: 2022-10-27 | End: 2023-05-12 | Stop reason: SDUPTHER

## 2022-10-27 ASSESSMENT — FIBROSIS 4 INDEX: FIB4 SCORE: 1.73

## 2022-10-27 ASSESSMENT — ENCOUNTER SYMPTOMS
COUGH: 0
STRIDOR: 0
WHEEZING: 0
CARDIOVASCULAR NEGATIVE: 1
DIZZINESS: 0
MUSCULOSKELETAL NEGATIVE: 1
CLAUDICATION: 0
CONSTITUTIONAL NEGATIVE: 1
HEMOPTYSIS: 0
BRUISES/BLEEDS EASILY: 0
RESPIRATORY NEGATIVE: 1
LOSS OF CONSCIOUSNESS: 0
NEUROLOGICAL NEGATIVE: 1
SPUTUM PRODUCTION: 0
EYES NEGATIVE: 1
GASTROINTESTINAL NEGATIVE: 1
ORTHOPNEA: 0
PALPITATIONS: 0
CHILLS: 0
SHORTNESS OF BREATH: 0
PND: 0
WEAKNESS: 0
FEVER: 0
SORE THROAT: 0

## 2022-10-27 NOTE — PROGRESS NOTES
"Chief Complaint   Patient presents with    Coronary Artery Disease     FV DX: Coronary artery disease, non-occlusive    Shortness of Breath     FV DX: MATAMOROS (dyspnea on exertion)    Paroxysmal Supraventricular Tachycardia (PSVT)       Subjective     Vaishali Solis is a 77 y.o. female who presents today as a follow-up from a prior provider for history of proximal atrial tachycardia hypertension mild aortic stenosis.  She went echocardiogram that showed grade 1 diastolic dysfunction is concerned about the prognosis because she reports her heart risk for heart attack.  Her blood pressure is controlled.  She gets no shortness of breath exertion.  She is on simvastatin her last LDL was in the low 100s.    Past Medical History:   Diagnosis Date    Anesthesia     \"couldn't open eye or talk\"    Arrhythmia     PSVT    CAD (coronary artery disease) 11/10/2010    Cardiac arrhythmia     Chickenpox     Crohns disease     Diarrhea     Dizziness 2/2/2010    GERD (gastroesophageal reflux disease)     Heart burn     Heartburn     High cholesterol     Hypercholesteremia 11/10/2010    Indigestion     Influenza     Mumps     Murmur 11/10/2010    Osteoporosis     Palpitations 11/10/2010    Paroxysmal supraventricular tachycardia (HCC) 11/10/2010    Pneumonia     Shortness of breath     Tonsillitis     Unspecified cataract     IOL OD     Past Surgical History:   Procedure Laterality Date    KNEE ARTHROSCOPY Left 7/6/2017    Procedure: KNEE ARTHROSCOPY;  Surgeon: Al Heredia M.D.;  Location: [facility];  Service:     MENISCECTOMY, KNEE, MEDIAL Left 7/6/2017    Procedure: MEDIAL MENISCECTOMY - PARTIAL, MUIR;  Surgeon: Al Heredia M.D.;  Location: [facility];  Service:     CATARACT PHACO WITH IOL Right 6/22/2015    Procedure: CATARACT PHACO WITH IOL;  Surgeon: David Llanos M.D.;  Location: [facility];  Service:     ABDOMINOPLASTY  2015    and face lift    INGUINAL HERNIA REPAIR Right 2014 "    CARPAL TUNNEL RELEASE  2012    SALPINGOSTOMY Bilateral     repair of tubes    ARTHROSCOPY, KNEE      PRIMARY C SECTION  ,      Family History   Problem Relation Age of Onset    Cancer Mother         Breast    Heart Attack Father          at age 83 of heart attack.    Heart Attack Brother         heart attack at age 41 and bypass surgery    Heart Attack Brother         bypass surgery at age 62    Cancer Maternal Aunt         Breast     Social History     Socioeconomic History    Marital status:      Spouse name: Not on file    Number of children: Not on file    Years of education: Not on file    Highest education level: Not on file   Occupational History    Occupation:      Employer: OTHER   Tobacco Use    Smoking status: Never    Smokeless tobacco: Never   Vaping Use    Vaping Use: Never used   Substance and Sexual Activity    Alcohol use: Yes     Comment: Rarely    Drug use: Never    Sexual activity: Not on file   Other Topics Concern    Not on file   Social History Narrative    Not on file     Social Determinants of Health     Financial Resource Strain: Not on file   Food Insecurity: Not on file   Transportation Needs: Not on file   Physical Activity: Not on file   Stress: Not on file   Social Connections: Not on file   Intimate Partner Violence: Not on file   Housing Stability: Not on file     No Known Allergies  Outpatient Encounter Medications as of 10/27/2022   Medication Sig Dispense Refill    esomeprazole (NEXIUM) 40 MG delayed-release capsule every day.      atorvastatin (LIPITOR) 40 MG Tab Take 1 Tablet by mouth 1/2 hour before dinner. 30 Tablet 11    flecainide (TAMBOCOR) 50 MG tablet Take 1 Tablet by mouth 2 times a day. 200 Tablet 11    spironolactone (ALDACTONE) 25 MG Tab Take 1 Tablet by mouth every day. 100 Tablet 0    alendronate (FOSAMAX) 35 MG tablet every 7 days.      XIIDRA 5 % Solution       aspirin 81 MG tablet Take 81 mg by mouth every evening.       "[DISCONTINUED] simvastatin (ZOCOR) 40 MG Tab TAKE 1 TABLET BY MOUTH AT BEDTIME 100 Tablet 11    [DISCONTINUED] pantoprazole (PROTONIX) 40 MG Tablet Delayed Response Take 40 mg by mouth every day.       No facility-administered encounter medications on file as of 10/27/2022.     Review of Systems   Constitutional: Negative.  Negative for chills, fever and malaise/fatigue.   HENT: Negative.  Negative for sore throat.    Eyes: Negative.    Respiratory: Negative.  Negative for cough, hemoptysis, sputum production, shortness of breath, wheezing and stridor.    Cardiovascular: Negative.  Negative for chest pain, palpitations, orthopnea, claudication, leg swelling and PND.   Gastrointestinal: Negative.    Genitourinary: Negative.    Musculoskeletal: Negative.    Skin: Negative.    Neurological: Negative.  Negative for dizziness, loss of consciousness and weakness.   Endo/Heme/Allergies: Negative.  Does not bruise/bleed easily.   All other systems reviewed and are negative.           Objective     /58 (BP Location: Left arm, Patient Position: Sitting, BP Cuff Size: Adult)   Pulse 71   Resp 20   Ht 1.499 m (4' 11\")   Wt 57.2 kg (126 lb)   SpO2 97%   BMI 25.45 kg/m²     Physical Exam  Vitals and nursing note reviewed.   Constitutional:       General: She is not in acute distress.     Appearance: She is well-developed. She is not diaphoretic.   HENT:      Head: Normocephalic and atraumatic.      Right Ear: External ear normal.      Left Ear: External ear normal.      Nose: Nose normal.      Mouth/Throat:      Pharynx: No oropharyngeal exudate.   Eyes:      General: No scleral icterus.        Right eye: No discharge.         Left eye: No discharge.      Conjunctiva/sclera: Conjunctivae normal.      Pupils: Pupils are equal, round, and reactive to light.   Neck:      Vascular: No JVD.   Cardiovascular:      Rate and Rhythm: Normal rate and regular rhythm.      Heart sounds: No murmur heard.    No friction rub. No " gallop.   Pulmonary:      Effort: Pulmonary effort is normal. No respiratory distress.      Breath sounds: No stridor. No wheezing or rales.   Chest:      Chest wall: No tenderness.   Abdominal:      General: There is no distension.      Palpations: Abdomen is soft.      Tenderness: There is no guarding.   Musculoskeletal:         General: No tenderness or deformity. Normal range of motion.      Cervical back: Neck supple.   Skin:     General: Skin is warm and dry.      Coloration: Skin is not pale.      Findings: No erythema or rash.   Neurological:      Mental Status: She is alert.      Cranial Nerves: No cranial nerve deficit.      Motor: No abnormal muscle tone.      Coordination: Coordination normal.      Deep Tendon Reflexes: Reflexes are normal and symmetric. Reflexes normal.   Psychiatric:         Behavior: Behavior normal.         Thought Content: Thought content normal.         Judgment: Judgment normal.          Echocardiogram: Dated 12/16/2021 personally viewed interpreted myself showing normal LV systolic function mild AS.  Impaired tissue Dopplers.    CTA: Dated 1/14/2021 personally viewed inter myself showing calcification of the main coronary arteries.    Lab Results   Component Value Date/Time    CHOLSTRLTOT 167 06/29/2021 07:49 AM     (H) 06/29/2021 07:49 AM    HDL 45 06/29/2021 07:49 AM    TRIGLYCERIDE 106 06/29/2021 07:49 AM       Lab Results   Component Value Date/Time    SODIUM 133 (L) 06/29/2021 07:49 AM    POTASSIUM 4.8 06/29/2021 07:49 AM    CHLORIDE 99 06/29/2021 07:49 AM    CO2 24 06/29/2021 07:49 AM    GLUCOSE 119 (H) 06/29/2021 07:49 AM    BUN 11 06/29/2021 07:49 AM    CREATININE 0.67 06/29/2021 07:49 AM     Lab Results   Component Value Date/Time    ALKPHOSPHAT 67 06/29/2021 07:49 AM    ASTSGOT 23 06/29/2021 07:49 AM    ALTSGPT 16 06/29/2021 07:49 AM    TBILIRUBIN 0.6 06/29/2021 07:49 AM          Assessment & Plan     1. Coronary artery disease involving native coronary artery of  native heart without angina pectoris  Lipid Profile      2. Hypercholesteremia  Lipid Profile    atorvastatin (LIPITOR) 40 MG Tab      3. Palpitations  Comp Metabolic Panel    atorvastatin (LIPITOR) 40 MG Tab      4. Paroxysmal supraventricular tachycardia (HCC)  Comp Metabolic Panel    Lipid Profile      5. Coronary artery disease, non-occlusive  Comp Metabolic Panel    Lipid Profile    atorvastatin (LIPITOR) 40 MG Tab      6. PAT (paroxysmal atrial tachycardia) (HCC)        7. MATAMOROS (dyspnea on exertion)        8. Mild aortic regurgitation        9. Mild aortic stenosis        10. Atherosclerosis of abdominal aorta (HCC)        11. Prediabetes        12. BMI 25.0-25.9,adult            Medical Decision Making: Today's Assessment/Status/Plan:        77-year-old female with mild AS and risk for coronary disease.  I would have her stop her simvastatin and switch to atorvastatin.  Otherwise I think she is low risk for further cardiac issues as I explained to her.  I will just change her atorvastatin and check her lipids.

## 2022-12-30 ENCOUNTER — DOCUMENTATION (OUTPATIENT)
Dept: HEALTH INFORMATION MANAGEMENT | Facility: OTHER | Age: 77
End: 2022-12-30
Payer: MEDICARE

## 2023-01-20 DIAGNOSIS — I51.89 DIASTOLIC DYSFUNCTION: ICD-10-CM

## 2023-01-26 RX ORDER — SPIRONOLACTONE 25 MG/1
TABLET ORAL
Qty: 100 TABLET | Refills: 0 | Status: SHIPPED | OUTPATIENT
Start: 2023-01-26 | End: 2023-05-23

## 2023-03-10 ENCOUNTER — OFFICE VISIT (OUTPATIENT)
Dept: URGENT CARE | Facility: CLINIC | Age: 78
End: 2023-03-10
Payer: MEDICARE

## 2023-03-10 ENCOUNTER — APPOINTMENT (OUTPATIENT)
Dept: RADIOLOGY | Facility: IMAGING CENTER | Age: 78
End: 2023-03-10
Attending: PHYSICIAN ASSISTANT
Payer: MEDICARE

## 2023-03-10 VITALS
RESPIRATION RATE: 16 BRPM | BODY MASS INDEX: 23.41 KG/M2 | HEART RATE: 90 BPM | OXYGEN SATURATION: 96 % | TEMPERATURE: 97.7 F | SYSTOLIC BLOOD PRESSURE: 104 MMHG | HEIGHT: 61 IN | WEIGHT: 124 LBS | DIASTOLIC BLOOD PRESSURE: 70 MMHG

## 2023-03-10 DIAGNOSIS — R05.8 PRODUCTIVE COUGH: ICD-10-CM

## 2023-03-10 PROCEDURE — 71046 X-RAY EXAM CHEST 2 VIEWS: CPT | Mod: TC | Performed by: PHYSICIAN ASSISTANT

## 2023-03-10 PROCEDURE — 99203 OFFICE O/P NEW LOW 30 MIN: CPT | Performed by: PHYSICIAN ASSISTANT

## 2023-03-10 ASSESSMENT — ENCOUNTER SYMPTOMS
DIARRHEA: 0
SORE THROAT: 1
MYALGIAS: 1
COUGH: 1
FEVER: 0
VOMITING: 0
SPUTUM PRODUCTION: 1
CHILLS: 0
SHORTNESS OF BREATH: 1

## 2023-03-10 ASSESSMENT — FIBROSIS 4 INDEX: FIB4 SCORE: 1.73

## 2023-03-11 NOTE — PROGRESS NOTES
Subjective     Vaishali Solis is a 77 y.o. female who presents with Cough (X 1 wk, productive cough, chest congestion, wheezing and shortness of breath.  Just taking prednisone and z-luis, feeling worse)    HPI:  Shaniqua Solis is a 77 y.o. female who presents today for evaluation of a cough.  Patient reports the cough started 1 week ago.  Within 24 hours of her symptoms started she saw her primary care provider and says that she tested negative for strep throat and COVID-19 virus at the time.  She was prescribed prednisone and a Z-Luis.  She says that she took the medications as prescribed but now she feels worse.  She still has chest congestion and has occasional wheezing and shortness of breath with productive cough.  She has a history of pneumonia and says this feels similar.  She has not had any fever.      Review of Systems   Constitutional:  Positive for malaise/fatigue. Negative for chills and fever.   HENT:  Positive for congestion and sore throat.    Respiratory:  Positive for cough, sputum production and shortness of breath.    Gastrointestinal:  Negative for diarrhea and vomiting.   Musculoskeletal:  Positive for myalgias.         PMH:  has a past medical history of Anesthesia, Arrhythmia, CAD (coronary artery disease) (11/10/2010), Cardiac arrhythmia, Chickenpox, Crohns disease, Diarrhea, Dizziness (2/2/2010), GERD (gastroesophageal reflux disease), Heart burn, Heartburn, High cholesterol, Hypercholesteremia (11/10/2010), Indigestion, Influenza, Mumps, Murmur (11/10/2010), Osteoporosis, Palpitations (11/10/2010), Paroxysmal supraventricular tachycardia (HCC) (11/10/2010), Pneumonia, Shortness of breath, Tonsillitis, and Unspecified cataract.  MEDS:   Current Outpatient Medications:     spironolactone (ALDACTONE) 25 MG Tab, TAKE 1 TABLET BY MOUTH DAILY, Disp: 100 Tablet, Rfl: 0    atorvastatin (LIPITOR) 40 MG Tab, Take 1 Tablet by mouth 1/2 hour before dinner., Disp: 30 Tablet, Rfl: 11    flecainide  "(TAMBOCOR) 50 MG tablet, Take 1 Tablet by mouth 2 times a day., Disp: 200 Tablet, Rfl: 11    alendronate (FOSAMAX) 35 MG tablet, every 7 days., Disp: , Rfl:     XIIDRA 5 % Solution, , Disp: , Rfl:     aspirin 81 MG tablet, Take 81 mg by mouth every evening., Disp: , Rfl:     esomeprazole (NEXIUM) 40 MG delayed-release capsule, every day. (Patient not taking: Reported on 3/10/2023), Disp: , Rfl:   ALLERGIES: No Known Allergies  SURGHX:   Past Surgical History:   Procedure Laterality Date    KNEE ARTHROSCOPY Left 7/6/2017    Procedure: KNEE ARTHROSCOPY;  Surgeon: Al Heredia M.D.;  Location: SURGERY [facility];  Service:     MENISCECTOMY, KNEE, MEDIAL Left 7/6/2017    Procedure: MEDIAL MENISCECTOMY - PARTIAL, MUIR;  Surgeon: Al Heredia M.D.;  Location: SURGERY [facility];  Service:     CATARACT PHACO WITH IOL Right 6/22/2015    Procedure: CATARACT PHACO WITH IOL;  Surgeon: David Llanos M.D.;  Location: SURGERY [facility];  Service:     ABDOMINOPLASTY  2015    and face lift    INGUINAL HERNIA REPAIR Right 2014    CARPAL TUNNEL RELEASE  2012    SALPINGOSTOMY Bilateral 1975    repair of tubes    ARTHROSCOPY, KNEE      PRIMARY C SECTION  1977, 1985     SOCHX:  reports that she has never smoked. She has never used smokeless tobacco. She reports current alcohol use. She reports that she does not use drugs.  FH: Family history was reviewed, no pertinent findings to report      Objective     /70 (BP Location: Left arm, Patient Position: Sitting, BP Cuff Size: Adult)   Pulse 90   Temp 36.5 °C (97.7 °F) (Temporal)   Resp 16   Ht 1.549 m (5' 1\")   Wt 56.2 kg (124 lb)   SpO2 96%   BMI 23.43 kg/m²      Physical Exam  Constitutional:       Appearance: She is well-developed.   HENT:      Head: Normocephalic and atraumatic.      Right Ear: External ear normal.      Left Ear: External ear normal.   Eyes:      Conjunctiva/sclera: Conjunctivae normal.      Pupils: Pupils are equal, round, " and reactive to light.   Cardiovascular:      Rate and Rhythm: Normal rate and regular rhythm.      Heart sounds: Murmur heard.   Pulmonary:      Effort: Pulmonary effort is normal.      Breath sounds: Normal breath sounds. No decreased breath sounds, wheezing, rhonchi or rales.   Musculoskeletal:      Cervical back: Normal range of motion.   Lymphadenopathy:      Cervical: No cervical adenopathy.   Skin:     General: Skin is warm and dry.      Capillary Refill: Capillary refill takes less than 2 seconds.   Neurological:      Mental Status: She is alert and oriented to person, place, and time.   Psychiatric:         Behavior: Behavior normal.         Judgment: Judgment normal.         Assessment & Plan     1. Productive cough  - DX-CHEST-2 VIEWS; Future  Lungs are CTA B.  Vital signs stable.  Discussed with patient that I am not comfortable placing her on another round of antibiotics without x-ray evidence of pneumonia as she just completed a round of azithromycin within the last week.  I would also recommend that she retest for COVID-19 virus that she tested the first time within 24 hours of her symptom onset.  There is no x-ray available from the urgent care setting at this time.  X-ray was ordered.  She states that she is going to go to [facility] urgent care right now to get the x-ray done.  Discussed that I will notify her via MyChart of results.  If it does show signs of pneumonia I will send over an additional course of antibiotics to the pharmacy.  Otherwise discussed that this may be related to a viral URI and that the cough from a viral upper respiratory infection can sometimes persist for several weeks.  Recommend using OTC cough medications for symptom relief.       Addendum, 6:55 PM: Chest x-ray negative for acute cardiopulmonary process.  Discussed with patient that symptoms are likely viral in nature.  Supportive care discussed include the use of saline nasal rinses, steam inhalation, use of a cool-mist  humidifier in the bedroom at night, trying to sleep with the head of the bed elevated.  Also recommended again that she repeat an at-home test for COVID-19 virus.          Differential Diagnosis, natural history, and supportive care discussed. Return to the Urgent Care or follow up with your PCP if symptoms fail to resolve, or for any new or worsening symptoms. Emergency room precautions discussed. Patient and/or family appears understanding of information.

## 2023-03-17 ENCOUNTER — HOSPITAL ENCOUNTER (OUTPATIENT)
Dept: LAB | Facility: MEDICAL CENTER | Age: 78
End: 2023-03-17
Attending: INTERNAL MEDICINE
Payer: MEDICARE

## 2023-03-17 DIAGNOSIS — R00.2 PALPITATIONS: Chronic | ICD-10-CM

## 2023-03-17 DIAGNOSIS — I47.10 PAROXYSMAL SUPRAVENTRICULAR TACHYCARDIA (HCC): Chronic | ICD-10-CM

## 2023-03-17 DIAGNOSIS — E78.00 HYPERCHOLESTEREMIA: Chronic | ICD-10-CM

## 2023-03-17 DIAGNOSIS — I25.10 CORONARY ARTERY DISEASE, NON-OCCLUSIVE: ICD-10-CM

## 2023-03-17 DIAGNOSIS — I25.10 CORONARY ARTERY DISEASE INVOLVING NATIVE CORONARY ARTERY OF NATIVE HEART WITHOUT ANGINA PECTORIS: Chronic | ICD-10-CM

## 2023-03-17 LAB
ALBUMIN SERPL BCP-MCNC: 3.8 G/DL (ref 3.2–4.9)
ALBUMIN/GLOB SERPL: 1.3 G/DL
ALP SERPL-CCNC: 71 U/L (ref 30–99)
ALT SERPL-CCNC: 11 U/L (ref 2–50)
ANION GAP SERPL CALC-SCNC: 9 MMOL/L (ref 7–16)
AST SERPL-CCNC: 11 U/L (ref 12–45)
BILIRUB SERPL-MCNC: 0.5 MG/DL (ref 0.1–1.5)
BUN SERPL-MCNC: 23 MG/DL (ref 8–22)
CALCIUM ALBUM COR SERPL-MCNC: 9.6 MG/DL (ref 8.5–10.5)
CALCIUM SERPL-MCNC: 9.4 MG/DL (ref 8.5–10.5)
CHLORIDE SERPL-SCNC: 103 MMOL/L (ref 96–112)
CHOLEST SERPL-MCNC: 214 MG/DL (ref 100–199)
CO2 SERPL-SCNC: 25 MMOL/L (ref 20–33)
CREAT SERPL-MCNC: 0.59 MG/DL (ref 0.5–1.4)
FASTING STATUS PATIENT QL REPORTED: NORMAL
GFR SERPLBLD CREATININE-BSD FMLA CKD-EPI: 92 ML/MIN/1.73 M 2
GLOBULIN SER CALC-MCNC: 2.9 G/DL (ref 1.9–3.5)
GLUCOSE SERPL-MCNC: 121 MG/DL (ref 65–99)
HDLC SERPL-MCNC: 44 MG/DL
LDLC SERPL CALC-MCNC: 151 MG/DL
POTASSIUM SERPL-SCNC: 4.5 MMOL/L (ref 3.6–5.5)
PROT SERPL-MCNC: 6.7 G/DL (ref 6–8.2)
SODIUM SERPL-SCNC: 137 MMOL/L (ref 135–145)
TRIGL SERPL-MCNC: 95 MG/DL (ref 0–149)

## 2023-03-17 PROCEDURE — 80053 COMPREHEN METABOLIC PANEL: CPT

## 2023-03-17 PROCEDURE — 36415 COLL VENOUS BLD VENIPUNCTURE: CPT

## 2023-03-17 PROCEDURE — 80061 LIPID PANEL: CPT

## 2023-04-26 ENCOUNTER — OFFICE VISIT (OUTPATIENT)
Dept: CARDIOLOGY | Facility: MEDICAL CENTER | Age: 78
End: 2023-04-26
Payer: MEDICARE

## 2023-04-26 VITALS
OXYGEN SATURATION: 96 % | HEIGHT: 61 IN | HEART RATE: 82 BPM | RESPIRATION RATE: 16 BRPM | BODY MASS INDEX: 22.84 KG/M2 | DIASTOLIC BLOOD PRESSURE: 66 MMHG | SYSTOLIC BLOOD PRESSURE: 122 MMHG | WEIGHT: 121 LBS

## 2023-04-26 DIAGNOSIS — I35.0 MILD AORTIC STENOSIS: ICD-10-CM

## 2023-04-26 DIAGNOSIS — Z79.899 LONG TERM CURRENT USE OF ANTIARRHYTHMIC MEDICAL THERAPY: ICD-10-CM

## 2023-04-26 DIAGNOSIS — I47.19 PAT (PAROXYSMAL ATRIAL TACHYCARDIA) (HCC): ICD-10-CM

## 2023-04-26 DIAGNOSIS — E78.00 HYPERCHOLESTEREMIA: Chronic | ICD-10-CM

## 2023-04-26 DIAGNOSIS — I51.7 LEFT VENTRICULAR HYPERTROPHY: ICD-10-CM

## 2023-04-26 DIAGNOSIS — I25.10 CORONARY ARTERY DISEASE, NON-OCCLUSIVE: ICD-10-CM

## 2023-04-26 DIAGNOSIS — I35.1 MILD AORTIC REGURGITATION: ICD-10-CM

## 2023-04-26 DIAGNOSIS — I47.10 PAROXYSMAL SUPRAVENTRICULAR TACHYCARDIA (HCC): Chronic | ICD-10-CM

## 2023-04-26 PROCEDURE — 99214 OFFICE O/P EST MOD 30 MIN: CPT | Performed by: INTERNAL MEDICINE

## 2023-04-26 RX ORDER — CLINDAMYCIN HYDROCHLORIDE 150 MG/1
CAPSULE ORAL
COMMUNITY
Start: 2023-04-06

## 2023-04-26 RX ORDER — AZITHROMYCIN 250 MG/1
TABLET, FILM COATED ORAL
COMMUNITY
Start: 2023-03-03

## 2023-04-26 RX ORDER — VALACYCLOVIR HYDROCHLORIDE 500 MG/1
TABLET, FILM COATED ORAL
COMMUNITY
Start: 2023-02-17

## 2023-04-26 RX ORDER — SIMVASTATIN 40 MG
40 TABLET ORAL DAILY
COMMUNITY

## 2023-04-26 RX ORDER — ESTRADIOL 0.1 MG/G
CREAM VAGINAL
COMMUNITY
Start: 2023-03-16

## 2023-04-26 ASSESSMENT — ENCOUNTER SYMPTOMS
PALPITATIONS: 0
SHORTNESS OF BREATH: 0
LOSS OF CONSCIOUSNESS: 0
DIZZINESS: 0
MYALGIAS: 0
COUGH: 0

## 2023-04-26 ASSESSMENT — FIBROSIS 4 INDEX: FIB4 SCORE: 1

## 2023-04-26 NOTE — PROGRESS NOTES
"Chief Complaint   Patient presents with    Aortic Stenosis     F/V Dx: mild aortic stenosis    Ventricular Hypertrophy     F/V Dx: Left ventricular hypertrophy     Coronary Artery Disease     F/V Dx: Coronary artery disease involving native coronary artery of native heart without angina pectoris       Subjective     Vaishali Solis is a 77 y.o. female who presents today today for follow-up cardiac care.    The patient has CAD, palpitations, PAT, CAD, mild aortic regurgitation with nonsustained VT and hyperlipidemia.    Since 8/31/2021 appointment the patient saw Dr. Mitch Grande.  He had switched her from simvastatin to atorvastatin but the instructions were to take it at a specific time after meals which she could not comply with therefore went back to simvastatin.  Follow-up echocardiogram showed grade 1 diastolic dysfunction which Dr. Grande reassured that was a common finding with age.  Has had no cardiac symptoms including chest pain, palpitations, shortness of breath. She gets MATAMOROS going up stairs otherwise remains active.  Bowls regularly without any difficulty.  Continues to substitute teach.  Had been tried on beta-blockers by EP but developed significant MATAMOROS resolved after discontinuation.    No history of hypertension.  No diabetes mellitus.    Past Medical History:   Diagnosis Date    Anesthesia     \"couldn't open eye or talk\"    Arrhythmia     PSVT    CAD (coronary artery disease) 11/10/2010    Cardiac arrhythmia     Chickenpox     Crohns disease     Diarrhea     Dizziness 2/2/2010    GERD (gastroesophageal reflux disease)     Heart burn     Heartburn     High cholesterol     Hypercholesteremia 11/10/2010    Indigestion     Influenza     Mumps     Murmur 11/10/2010    Osteoporosis     Palpitations 11/10/2010    Paroxysmal supraventricular tachycardia (HCC) 11/10/2010    Pneumonia     Shortness of breath     Tonsillitis     Unspecified cataract     IOL OD     Past Surgical History:   Procedure Laterality " Date    KNEE ARTHROSCOPY Left 2017    Procedure: KNEE ARTHROSCOPY;  Surgeon: Al Heredia M.D.;  Location: SURGERY [facility];  Service:     MENISCECTOMY, KNEE, MEDIAL Left 2017    Procedure: MEDIAL MENISCECTOMY - PARTIAL, MUIR;  Surgeon: Al Heredia M.D.;  Location: SURGERY [facility];  Service:     CATARACT PHACO WITH IOL Right 2015    Procedure: CATARACT PHACO WITH IOL;  Surgeon: David Llanos M.D.;  Location: SURGERY [facility];  Service:     ABDOMINOPLASTY      and face lift    INGUINAL HERNIA REPAIR Right 2014    CARPAL TUNNEL RELEASE  2012    SALPINGOSTOMY Bilateral     repair of tubes    ARTHROSCOPY, KNEE      PRIMARY C SECTION  ,      Family History   Problem Relation Age of Onset    Cancer Mother         Breast    Heart Attack Father          at age 83 of heart attack.    Heart Attack Brother         heart attack at age 41 and bypass surgery    Heart Attack Brother         bypass surgery at age 62    Cancer Maternal Aunt         Breast     Social History     Socioeconomic History    Marital status:      Spouse name: Not on file    Number of children: Not on file    Years of education: Not on file    Highest education level: Not on file   Occupational History    Occupation:      Employer: OTHER   Tobacco Use    Smoking status: Never    Smokeless tobacco: Never   Vaping Use    Vaping Use: Never used   Substance and Sexual Activity    Alcohol use: Yes     Comment: Rarely    Drug use: Never    Sexual activity: Not on file   Other Topics Concern    Not on file   Social History Narrative    Not on file     Social Determinants of Health     Financial Resource Strain: Not on file   Food Insecurity: Not on file   Transportation Needs: Not on file   Physical Activity: Not on file   Stress: Not on file   Social Connections: Not on file   Intimate Partner Violence: Not on file   Housing Stability: Not on file     No Known  "Allergies  Outpatient Encounter Medications as of 4/26/2023   Medication Sig Dispense Refill    estradiol (ESTRACE) 0.1 MG/GM vaginal cream       clindamycin (CLEOCIN) 150 MG Cap       azithromycin (ZITHROMAX) 250 MG Tab       valACYclovir (VALTREX) 500 MG Tab       simvastatin (ZOCOR) 40 MG Tab Take 40 mg by mouth every day.      spironolactone (ALDACTONE) 25 MG Tab TAKE 1 TABLET BY MOUTH DAILY 100 Tablet 0    esomeprazole (NEXIUM) 40 MG delayed-release capsule every day.      flecainide (TAMBOCOR) 50 MG tablet Take 1 Tablet by mouth 2 times a day. 200 Tablet 11    alendronate (FOSAMAX) 35 MG tablet every 7 days.      XIIDRA 5 % Solution       aspirin 81 MG tablet Take 81 mg by mouth every evening.      atorvastatin (LIPITOR) 40 MG Tab Take 1 Tablet by mouth 1/2 hour before dinner. (Patient not taking: Reported on 4/26/2023) 30 Tablet 11     No facility-administered encounter medications on file as of 4/26/2023.     Review of Systems   Respiratory:  Negative for cough and shortness of breath.    Cardiovascular:  Negative for chest pain and palpitations.   Genitourinary:  Negative for hematuria.   Musculoskeletal:  Negative for myalgias.   Neurological:  Negative for dizziness and loss of consciousness.            Objective     /66 (BP Location: Left arm, Patient Position: Sitting, BP Cuff Size: Adult)   Pulse 82   Resp 16   Ht 1.549 m (5' 1\")   Wt 54.9 kg (121 lb)   SpO2 96%   BMI 22.86 kg/m²     Physical Exam  Constitutional:       General: She is not in acute distress.     Appearance: She is well-developed.   Eyes:      Conjunctiva/sclera: Conjunctivae normal.      Pupils: Pupils are equal, round, and reactive to light.   Neck:      Vascular: No JVD.   Cardiovascular:      Rate and Rhythm: Normal rate and regular rhythm.      Pulses: Normal pulses.      Heart sounds: Murmur heard.     No friction rub. No gallop.   Pulmonary:      Effort: Pulmonary effort is normal. No accessory muscle usage or " respiratory distress.      Breath sounds: Normal breath sounds. No wheezing or rales.   Abdominal:      General: There is no distension.      Palpations: Abdomen is soft. There is no mass.      Tenderness: There is no abdominal tenderness.   Musculoskeletal:      Right lower leg: No edema.      Left lower leg: No edema.   Skin:     General: Skin is warm and dry.      Findings: No rash.      Nails: There is no clubbing.   Neurological:      Mental Status: She is alert and oriented to person, place, and time.   Psychiatric:         Behavior: Behavior normal.               04/04/2012 Lab: Cholesterol 178. Triglycerides 146. HDL 49. .     ECHOCARDIOGRAM 11/29/2010  EF 84%. Mild LVH. 1-2+ AR. 1+ MR. PSP 22-27 mmHg.     ECHOCARDIOGRAM 01/05/2017  Normal left ventricular systolic function.  Moderate concentric left ventricular hypertrophy.  Left ventricular ejection fraction is visually estimated to be 70%.  Aortic sclerosis without stenosis.  Mild aortic insufficiency.  Right heart pressures are normal.  No prior study is available for comparison.     ECHOCARDIOGRAM 12/28/2018  Mild concentric left ventricular hypertrophy.  Left ventricular ejection fraction is visually estimated to be 70%.  Normal regional wall motion.  Grade II diastolic dysfunction.  Mild aortic insufficiency.  Mild mitral regurgitation..  Estimated right ventricular systolic pressure  is 40  mmHg.     ECHOCARDIOGRAM 12/11/2020  Normal left ventricular chamber size.  Left ventricular ejection fraction is visually estimated to be 70%.  Moderate eccentric left ventricular hypertrophy.  Grade II diastolic dysfunction.  Mild mitral regurgitation.  Mild aortic stenosis. Vmax is 2.4  m/s.  Normal right ventricular size and systolic function.  Normal inferior vena cava size and inspiratory collapse.  Estimated right ventricular systolic pressure  is 37 mmHg.  Compared to the prior study done -  there has been development of mils   aortic stenosis and  an increase in LV thickness.    ECHOCARDIOGRAM  Normal left ventricular chamber size.  Left ventricular ejection fraction is visually estimated to be 70%.  Moderate eccentric left ventricular hypertrophy.  Grade II diastolic dysfunction.  Mild mitral regurgitation.  Mild aortic stenosis. Vmax is 2.4  m/s.  Normal right ventricular size and systolic function.  Normal inferior vena cava size and inspiratory collapse.  Estimated right ventricular systolic pressure  is 37 mmHg.  Compared to the prior study done -  there has been development of mild   aortic stenosis and an increase in LV thickness.       MPI 02/17/2012  1)   Normal exercise Cardiolite stress test with no evidence of  ischemia or infarction.   2)  Normal left ventricular systolic function with a calculated  ejection fraction of 86%.   3)  No chest pain reported, and no ischemic EKG changes seen.   4)  Average exercise tolerance.   5)  No prior nuclear stress test available for comparison.       MPI 01/05/2017  Normal myocardial perfusion with no ischemia.   Normal left ventricular wall motion.  LV ejection fraction = 73%.   ECG INTERPRETATION   Negative stress ECG for ischemia.     MPI 11/3/2020   No evidence of significant jeopardized viable myocardium or prior myocardial infarction.   Normal left ventricular size, ejection fraction, and wall motion.   ECG INTERPRETATION   Negative stress ECG for ischemia.     01/23/2003 CARDIAC CATHETERIZATION  EF 67%.  50-70% diagonal stenosis.  Treated medically.     11/02/2016 HOLTER MONITOR  Normal sinus rhythm.  Multiple episodes of SVT.  Nonsustained VT.    TREADMILL 6/2/2021  Patient exercised for 5 minutes, 10 seconds, and 7.1 METs.   100% of MPHR: 145   90% of MPHR: 131   85% of MPHR: 123   Peak Heart Rate Achieved: 118   82 % of MPHR.   Maximum /60 mmHg   Test was terminated due to: Severe shortness of breath   Nondiagnostic stress test for ischemia, did not achieve target heart rate.   Test terminated  due to shortness of breath.     Assessment & Plan     1. Paroxysmal supraventricular tachycardia (HCC)        2. PAT (paroxysmal atrial tachycardia) (HCC)        3. Long term current use of antiarrhythmic medical therapy        4. Coronary artery disease, non-occlusive        5. Hypercholesteremia        6. Mild aortic regurgitation        7. Mild aortic stenosis        8. Left ventricular hypertrophy            Medical Decision Making: Today's Assessment/Status/Plan:   Assessment  PAT.   CAD. Diagonal stenosis 2003.  Aortic stenosis, mild.  Aortic regurgitation.  Mild.  Hypercholesterolemia.  Nonsustained ventricular tachycardia.  Left ventricular hypertrophy  Beta-blocker induced SOB.     Recommendations and Discussion  CAD, no ischemic symptoms, continue aspirin; optimize lipid status to restart atorvastatin 40 mg daily and stop simvastatin, repeat lipid panel prior to next appointment  Aortic stenosis/regurgitation, mild, asymptomatic, reviewed echocardiogram images, continue to monitor  Dyslipidemia, switch back to simvastatin as she was unable to comply with the specified nighttime dosing recommended for atorvastatin, clarified the issue and she will restart atorvastatin 40 mg daily recheck lipid panel prior to next appointment  PAT/PSVT on flecainide, cannot tolerate beta-blockers due to MATAMOROS which resolved after discontinuation, followed by EP.  LVH with CRISSY, reviewed echocardiogram reviewed concern about grade 1 diastolic dysfunction, explained significance of the findings.  RTC 6 months, sooner if necessary

## 2023-05-01 PROBLEM — Z86.79 HISTORY OF PULMONARY HYPERTENSION: Status: ACTIVE | Noted: 2023-05-01

## 2023-05-01 PROBLEM — A60.00 HERPES SIMPLEX INFECTION OF GENITOURINARY SYSTEM: Status: ACTIVE | Noted: 2023-05-01

## 2023-05-12 DIAGNOSIS — E78.00 HYPERCHOLESTEREMIA: Chronic | ICD-10-CM

## 2023-05-12 DIAGNOSIS — R00.2 PALPITATIONS: Chronic | ICD-10-CM

## 2023-05-12 DIAGNOSIS — I25.10 CORONARY ARTERY DISEASE, NON-OCCLUSIVE: ICD-10-CM

## 2023-05-13 RX ORDER — ATORVASTATIN CALCIUM 40 MG/1
40 TABLET, FILM COATED ORAL
Qty: 100 TABLET | Refills: 3 | Status: SHIPPED | OUTPATIENT
Start: 2023-05-13

## 2023-05-13 NOTE — TELEPHONE ENCOUNTER
Is the patient due for a refill? No- pharmacy requesting 100-day supply per insurance    Was the patient seen the past year? Yes    Date of last office visit: 4/26/2023    Does the patient have an upcoming appointment?  Yes   If yes, When? 10/24/2023    Provider to refill:NICK    Does the patients insurance require a 100 day supply?  Yes

## 2023-10-24 ENCOUNTER — OFFICE VISIT (OUTPATIENT)
Dept: CARDIOLOGY | Facility: MEDICAL CENTER | Age: 78
End: 2023-10-24
Attending: INTERNAL MEDICINE
Payer: MEDICARE

## 2023-10-24 VITALS
OXYGEN SATURATION: 97 % | HEIGHT: 59 IN | BODY MASS INDEX: 25.04 KG/M2 | WEIGHT: 124.2 LBS | HEART RATE: 76 BPM | SYSTOLIC BLOOD PRESSURE: 124 MMHG | DIASTOLIC BLOOD PRESSURE: 68 MMHG

## 2023-10-24 DIAGNOSIS — Z79.899 LONG TERM CURRENT USE OF ANTIARRHYTHMIC MEDICAL THERAPY: ICD-10-CM

## 2023-10-24 DIAGNOSIS — E78.5 DYSLIPIDEMIA: ICD-10-CM

## 2023-10-24 DIAGNOSIS — I25.10 CORONARY ARTERY DISEASE, NON-OCCLUSIVE: ICD-10-CM

## 2023-10-24 DIAGNOSIS — I47.10 PAROXYSMAL SUPRAVENTRICULAR TACHYCARDIA (HCC): Chronic | ICD-10-CM

## 2023-10-24 DIAGNOSIS — I47.19 PAT (PAROXYSMAL ATRIAL TACHYCARDIA) (HCC): ICD-10-CM

## 2023-10-24 DIAGNOSIS — I35.1 MILD AORTIC REGURGITATION: ICD-10-CM

## 2023-10-24 DIAGNOSIS — I35.0 MILD AORTIC STENOSIS: ICD-10-CM

## 2023-10-24 LAB — EKG IMPRESSION: NORMAL

## 2023-10-24 PROCEDURE — 3078F DIAST BP <80 MM HG: CPT | Performed by: INTERNAL MEDICINE

## 2023-10-24 PROCEDURE — 99214 OFFICE O/P EST MOD 30 MIN: CPT | Performed by: INTERNAL MEDICINE

## 2023-10-24 PROCEDURE — 93005 ELECTROCARDIOGRAM TRACING: CPT | Performed by: INTERNAL MEDICINE

## 2023-10-24 PROCEDURE — 99213 OFFICE O/P EST LOW 20 MIN: CPT | Performed by: INTERNAL MEDICINE

## 2023-10-24 PROCEDURE — 3074F SYST BP LT 130 MM HG: CPT | Performed by: INTERNAL MEDICINE

## 2023-10-24 PROCEDURE — 93010 ELECTROCARDIOGRAM REPORT: CPT | Performed by: INTERNAL MEDICINE

## 2023-10-24 ASSESSMENT — ENCOUNTER SYMPTOMS
COUGH: 0
LOSS OF CONSCIOUSNESS: 0
BRUISES/BLEEDS EASILY: 0
SHORTNESS OF BREATH: 0
MYALGIAS: 0
DIZZINESS: 0
PALPITATIONS: 0

## 2023-10-24 NOTE — PROGRESS NOTES
"Chief Complaint   Patient presents with    Ventricular Tachycardia     F/V DX: Paroxysmal supraventricular tachycardia (HCC)    Coronary Artery Disease     F/V Dx: Coronary artery disease, non-occlusive       Subjective     Vaishali Solis is a 78 y.o. female who presents today today for follow-up cardiac care.    The patient has CAD, palpitations, PAT, CAD, mild aortic regurgitation with nonsustained VT and hyperlipidemia.    Since 4/26/2023 appointment the patient has had no cardiac symptoms including chest pain, palpitations, shortness of breath.  Admits to dietary indiscretion    No history of hypertension.  No diabetes mellitus.    Past Medical History:   Diagnosis Date    Anesthesia     \"couldn't open eye or talk\"    Arrhythmia     PSVT    CAD (coronary artery disease) 11/10/2010    Cardiac arrhythmia     Chickenpox     Crohns disease     Diarrhea     Dizziness 2/2/2010    GERD (gastroesophageal reflux disease)     Heart burn     Heartburn     High cholesterol     Hypercholesteremia 11/10/2010    Indigestion     Influenza     Mumps     Murmur 11/10/2010    Osteoporosis     Palpitations 11/10/2010    Paroxysmal supraventricular tachycardia 11/10/2010    Pneumonia     Shortness of breath     Tonsillitis     Unspecified cataract     IOL OD     Past Surgical History:   Procedure Laterality Date    KNEE ARTHROSCOPY Left 7/6/2017    Procedure: KNEE ARTHROSCOPY;  Surgeon: Al Heredia M.D.;  Location: [facility];  Service:     MENISCECTOMY, KNEE, MEDIAL Left 7/6/2017    Procedure: MEDIAL MENISCECTOMY - PARTIAL, MUIR;  Surgeon: Al Heredia M.D.;  Location: [facility];  Service:     CATARACT PHACO WITH IOL Right 6/22/2015    Procedure: CATARACT PHACO WITH IOL;  Surgeon: David Llanos M.D.;  Location: [facility];  Service:     ABDOMINOPLASTY  2015    and face lift    INGUINAL HERNIA REPAIR Right 2014    CARPAL TUNNEL RELEASE  2012    SALPINGOSTOMY Bilateral 1975    " repair of tubes    ARTHROSCOPY, KNEE      PRIMARY C SECTION  1985     Family History   Problem Relation Age of Onset    Cancer Mother         Breast    Heart Attack Father          at age 83 of heart attack.    Heart Attack Brother         heart attack at age 41 and bypass surgery    Heart Attack Brother         bypass surgery at age 62    Cancer Maternal Aunt         Breast     Social History     Socioeconomic History    Marital status:      Spouse name: Not on file    Number of children: Not on file    Years of education: Not on file    Highest education level: Not on file   Occupational History    Occupation:      Employer: OTHER   Tobacco Use    Smoking status: Never    Smokeless tobacco: Never   Vaping Use    Vaping Use: Never used   Substance and Sexual Activity    Alcohol use: Yes     Comment: Rarely    Drug use: Never    Sexual activity: Not on file   Other Topics Concern    Not on file   Social History Narrative    Not on file     Social Determinants of Health     Financial Resource Strain: Not on file   Food Insecurity: Not on file   Transportation Needs: Not on file   Physical Activity: Not on file   Stress: Not on file   Social Connections: Not on file   Intimate Partner Violence: Not on file   Housing Stability: Not on file     No Known Allergies  Outpatient Encounter Medications as of 10/24/2023   Medication Sig Dispense Refill    spironolactone (ALDACTONE) 25 MG Tab TAKE 1 TABLET BY MOUTH DAILY 100 Tablet 2    atorvastatin (LIPITOR) 40 MG Tab Take 1 Tablet by mouth 1/2 hour before dinner. 100 Tablet 3    estradiol (ESTRACE) 0.1 MG/GM vaginal cream       clindamycin (CLEOCIN) 150 MG Cap       azithromycin (ZITHROMAX) 250 MG Tab       valACYclovir (VALTREX) 500 MG Tab       esomeprazole (NEXIUM) 40 MG delayed-release capsule every day.      flecainide (TAMBOCOR) 50 MG tablet Take 1 Tablet by mouth 2 times a day. 200 Tablet 11    alendronate (FOSAMAX) 35 MG tablet every 7  "days.      XIIDRA 5 % Solution       aspirin 81 MG tablet Take 81 mg by mouth every evening.      simvastatin (ZOCOR) 40 MG Tab Take 40 mg by mouth every day. (Patient not taking: Reported on 10/24/2023)       No facility-administered encounter medications on file as of 10/24/2023.     Review of Systems   Respiratory:  Negative for cough and shortness of breath.    Cardiovascular:  Negative for chest pain and palpitations.   Genitourinary:  Negative for hematuria.   Musculoskeletal:  Negative for myalgias.   Neurological:  Negative for dizziness and loss of consciousness.   Endo/Heme/Allergies:  Does not bruise/bleed easily.              Objective     /68 (BP Location: Left arm, Patient Position: Sitting, BP Cuff Size: Adult)   Pulse 76   Ht 1.499 m (4' 11\")   Wt 56.3 kg (124 lb 3.2 oz)   SpO2 97%   BMI 25.09 kg/m²     Physical Exam  Constitutional:       General: She is not in acute distress.     Appearance: She is well-developed.   Neck:      Vascular: No JVD.   Cardiovascular:      Rate and Rhythm: Normal rate and regular rhythm.      Pulses: Normal pulses.      Heart sounds: Murmur heard.      No friction rub. No gallop.   Pulmonary:      Effort: Pulmonary effort is normal. No accessory muscle usage or respiratory distress.      Breath sounds: Normal breath sounds. No wheezing or rales.   Abdominal:      General: There is no distension.      Palpations: Abdomen is soft. There is no mass.      Tenderness: There is no abdominal tenderness.   Musculoskeletal:      Right lower leg: No edema.      Left lower leg: No edema.   Skin:     General: Skin is warm and dry.      Findings: No rash.      Nails: There is no clubbing.   Neurological:      Mental Status: She is alert and oriented to person, place, and time.   Psychiatric:         Behavior: Behavior normal.              ECHOCARDIOGRAM 11/29/2010  EF 84%. Mild LVH. 1-2+ AR. 1+ MR. PSP 22-27 mmHg.     ECHOCARDIOGRAM 01/05/2017  Normal left ventricular " systolic function.  Moderate concentric left ventricular hypertrophy.  Left ventricular ejection fraction is visually estimated to be 70%.  Aortic sclerosis without stenosis.  Mild aortic insufficiency.  Right heart pressures are normal.  No prior study is available for comparison.     ECHOCARDIOGRAM 12/28/2018  Mild concentric left ventricular hypertrophy.  Left ventricular ejection fraction is visually estimated to be 70%.  Normal regional wall motion.  Grade II diastolic dysfunction.  Mild aortic insufficiency.  Mild mitral regurgitation..  Estimated right ventricular systolic pressure  is 40  mmHg.     ECHOCARDIOGRAM 12/11/2020  Normal left ventricular chamber size.  Left ventricular ejection fraction is visually estimated to be 70%.  Moderate eccentric left ventricular hypertrophy.  Grade II diastolic dysfunction.  Mild mitral regurgitation.  Mild aortic stenosis. Vmax is 2.4  m/s.  Normal right ventricular size and systolic function.  Normal inferior vena cava size and inspiratory collapse.  Estimated right ventricular systolic pressure  is 37 mmHg.  Compared to the prior study done -  there has been development of mils   aortic stenosis and an increase in LV thickness.    ECHOCARDIOGRAM  Normal left ventricular chamber size.  Left ventricular ejection fraction is visually estimated to be 70%.  Moderate eccentric left ventricular hypertrophy.  Grade II diastolic dysfunction.  Mild mitral regurgitation.  Mild aortic stenosis. Vmax is 2.4  m/s.  Normal right ventricular size and systolic function.  Normal inferior vena cava size and inspiratory collapse.  Estimated right ventricular systolic pressure  is 37 mmHg.  Compared to the prior study done -  there has been development of mild   aortic stenosis and an increase in LV thickness.       MPI 02/17/2012  1)   Normal exercise Cardiolite stress test with no evidence of  ischemia or infarction.   2)  Normal left ventricular systolic function with a  calculated  ejection fraction of 86%.   3)  No chest pain reported, and no ischemic EKG changes seen.   4)  Average exercise tolerance.   5)  No prior nuclear stress test available for comparison.       MPI 01/05/2017  Normal myocardial perfusion with no ischemia.   Normal left ventricular wall motion.  LV ejection fraction = 73%.   ECG INTERPRETATION   Negative stress ECG for ischemia.     MPI 11/3/2020   No evidence of significant jeopardized viable myocardium or prior myocardial infarction.   Normal left ventricular size, ejection fraction, and wall motion.   ECG INTERPRETATION   Negative stress ECG for ischemia.     01/23/2003 CARDIAC CATHETERIZATION  EF 67%.  50-70% diagonal stenosis.  Treated medically.     11/02/2016 HOLTER MONITOR  Normal sinus rhythm.  Multiple episodes of SVT.  Nonsustained VT.    TREADMILL 6/2/2021  Patient exercised for 5 minutes, 10 seconds, and 7.1 METs.   100% of MPHR: 145   90% of MPHR: 131   85% of MPHR: 123   Peak Heart Rate Achieved: 118   82 % of MPHR.   Maximum /60 mmHg   Test was terminated due to: Severe shortness of breath   Nondiagnostic stress test for ischemia, did not achieve target heart rate.   Test terminated due to shortness of breath.     Assessment & Plan     1. PAT (paroxysmal atrial tachycardia)  EKG      2. Dyslipidemia  EKG    LIPID PANEL      3. Paroxysmal supraventricular tachycardia        4. Long term current use of antiarrhythmic medical therapy        5. Coronary artery disease, non-occlusive        6. Mild aortic regurgitation        7. Mild aortic stenosis            Medical Decision Making: Today's Assessment/Status/Plan:   Assessment  PAT, SVT.   CAD. Diagonal stenosis 2003.  Aortic stenosis, mild.  Aortic regurgitation.  Mild.  Hypercholesterolemia.  Nonsustained ventricular tachycardia.  Left ventricular hypertrophy  Beta-blocker induced SOB.     Recommendations and Discussion  CAD, continues to be clinically stable EKG today shows sinus  rhythm, normal QRS with ST-T wave abnormalities, no ischemic symptoms, continue aspirin; could not tolerate beta-blockers, continue atorvastatin.  Aortic stenosis/regurgitation, mild, asymptomatic, continue to monitor, echocardiogram as needed  Dyslipidemia, , started on atorvastatin 40 mg daily, follow-up lipid panel, advised strict dietary heart healthy habits  PAT/PSVT on flecainide, cannot tolerate beta-blockers due to MATAMOROS which resolved after discontinuation, has not seen EP in over 2 years, will refer back for recommendations concerning antiarrhythmic therapy  LVH with CRISSY, reviewed echocardiogram reviewed concern about grade 1 diastolic dysfunction, explained significance of the findings.  RTC 6 months, sooner if necessary

## 2023-12-19 DIAGNOSIS — I47.19 PAT (PAROXYSMAL ATRIAL TACHYCARDIA) (HCC): ICD-10-CM

## 2023-12-19 RX ORDER — FLECAINIDE ACETATE 50 MG/1
50 TABLET ORAL 2 TIMES DAILY
Qty: 200 TABLET | Refills: 0 | Status: SHIPPED | OUTPATIENT
Start: 2023-12-19

## 2023-12-19 NOTE — TELEPHONE ENCOUNTER
NICK    Received request via: Patient    Was the patient seen in the last year in this department? Yes    Does the patient have an active prescription (recently filled or refills available) for medication(s) requested? Yes.     Does the patient have long term Plus and need 100 day supply (blood pressure, diabetes and cholesterol meds only)? Yes, quantity updated to 100 days     Thank you,     Marce ROCHA.    chest pain

## 2023-12-19 NOTE — TELEPHONE ENCOUNTER
RX reordered for 3 months.     Schedulers: Please call for appointment with SS (established patient). Thanks!

## 2024-01-09 ENCOUNTER — HOSPITAL ENCOUNTER (OUTPATIENT)
Dept: RADIOLOGY | Facility: MEDICAL CENTER | Age: 79
End: 2024-01-09
Attending: FAMILY MEDICINE
Payer: MEDICARE

## 2024-01-09 DIAGNOSIS — Z12.31 VISIT FOR SCREENING MAMMOGRAM: ICD-10-CM

## 2024-01-09 PROCEDURE — 77067 SCR MAMMO BI INCL CAD: CPT

## 2024-01-10 ENCOUNTER — HOSPITAL ENCOUNTER (OUTPATIENT)
Dept: RADIOLOGY | Facility: MEDICAL CENTER | Age: 79
End: 2024-01-10
Payer: MEDICARE

## 2024-01-11 ENCOUNTER — APPOINTMENT (OUTPATIENT)
Dept: RADIOLOGY | Facility: MEDICAL CENTER | Age: 79
End: 2024-01-11
Attending: FAMILY MEDICINE
Payer: MEDICARE

## 2024-01-11 DIAGNOSIS — Z12.31 VISIT FOR SCREENING MAMMOGRAM: ICD-10-CM

## 2024-03-10 ENCOUNTER — OFFICE VISIT (OUTPATIENT)
Dept: URGENT CARE | Facility: CLINIC | Age: 79
End: 2024-03-10
Payer: MEDICARE

## 2024-03-10 VITALS
RESPIRATION RATE: 16 BRPM | WEIGHT: 127 LBS | HEIGHT: 60 IN | HEART RATE: 90 BPM | BODY MASS INDEX: 24.94 KG/M2 | OXYGEN SATURATION: 99 % | TEMPERATURE: 97.2 F | DIASTOLIC BLOOD PRESSURE: 72 MMHG | SYSTOLIC BLOOD PRESSURE: 130 MMHG

## 2024-03-10 DIAGNOSIS — H69.90 DYSFUNCTION OF EUSTACHIAN TUBE, UNSPECIFIED LATERALITY: ICD-10-CM

## 2024-03-10 PROCEDURE — 3075F SYST BP GE 130 - 139MM HG: CPT

## 2024-03-10 PROCEDURE — 99213 OFFICE O/P EST LOW 20 MIN: CPT

## 2024-03-10 PROCEDURE — 3078F DIAST BP <80 MM HG: CPT

## 2024-03-10 RX ORDER — PREDNISONE 20 MG/1
TABLET ORAL
COMMUNITY
Start: 2024-03-08 | End: 2024-03-13

## 2024-03-10 NOTE — PROGRESS NOTES
Chief Complaint   Patient presents with    Ear Pain     X2wks, left ear pain, off balance         Subjective:   HISTORY OF PRESENT ILLNESS: Shaniqua Solis is a 78 y.o. female who presents for bilateral ear pain and the feeling of being off balance since yesterday.   Patient denies fever, drainage from her ears.  She did have a URI abot 2 weeks ago, recently seen her PCP who put her on z-ken and steroids 3 days ago.  Today she felt like she was off balance and fel into the the wall while walking.  She denies hitting her head, no LOC, no numbness, weakness in any of her extremities.  No difficulty with speech or word finding.     Medications, Allergies, current problem list, Social and Family history reviewed today in Epic.     Objective:     /72 (BP Location: Left arm, Patient Position: Sitting, BP Cuff Size: Adult)   Pulse 90   Temp 36.2 °C (97.2 °F) (Temporal)   Resp 16   Ht 1.524 m (5')   Wt 57.6 kg (127 lb)   SpO2 99%     Physical Exam  Vitals reviewed.   Constitutional:       Appearance: Normal appearance.   HENT:      Right Ear: A middle ear effusion is present. There is impacted cerumen. Tympanic membrane is not erythematous, retracted or bulging.      Left Ear: A middle ear effusion is present. There is impacted cerumen. Tympanic membrane is not erythematous, retracted or bulging.      Mouth/Throat:      Mouth: Mucous membranes are moist.   Cardiovascular:      Rate and Rhythm: Normal rate.   Pulmonary:      Effort: Pulmonary effort is normal.   Skin:     General: Skin is warm and dry.   Neurological:      Mental Status: She is alert and oriented to person, place, and time.   Psychiatric:         Mood and Affect: Mood normal.          Assessment/Plan:     Diagnosis and associated orders    I personally reviewed prior external notes and test results pertinent to today's visit.     1. Dysfunction of Eustachian tube, unspecified laterality              IMPRESSION:  Pt has stable vital signs and no  red flag symptoms or exam findings identified.   Informed pt that symptoms are consistent with mid ear effusion, she is already taking steriods.  I advised her begin an antihistamine and Flonase and give this another 24-48 hours.  ED for no resolution of her balance issues.  Instructed her to be cautious while walking, no ladder climbing.     Differential diagnosis discussed. Pt was Educated on red flag symptoms. Pt has been Instructed to return to Urgent Care or nearest Emergency Department if symptoms fail to improve, for any change in condition, further concerns, or new concerning symptoms. Patient states understanding of the plan of care and discharge instructions.  They are discharged in stable condition.         Please note that this dictation was created using voice recognition software. I have made a reasonable attempt to correct obvious errors, but I expect that there are errors of grammar and possibly content that I did not discover before finalizing the note.    This note was electronically signed by MATILDE Amaya

## 2024-03-25 ENCOUNTER — HOSPITAL ENCOUNTER (OUTPATIENT)
Dept: RADIOLOGY | Facility: MEDICAL CENTER | Age: 79
End: 2024-03-25
Attending: FAMILY MEDICINE
Payer: MEDICARE

## 2024-03-25 DIAGNOSIS — J20.9 ACUTE BRONCHITIS, UNSPECIFIED ORGANISM: ICD-10-CM

## 2024-03-25 DIAGNOSIS — M54.50 LOW BACK PAIN, UNSPECIFIED BACK PAIN LATERALITY, UNSPECIFIED CHRONICITY, UNSPECIFIED WHETHER SCIATICA PRESENT: ICD-10-CM

## 2024-03-25 DIAGNOSIS — M54.6 THORACIC BACK PAIN, UNSPECIFIED BACK PAIN LATERALITY, UNSPECIFIED CHRONICITY: ICD-10-CM

## 2024-03-25 PROCEDURE — 71046 X-RAY EXAM CHEST 2 VIEWS: CPT

## 2024-03-25 PROCEDURE — 72100 X-RAY EXAM L-S SPINE 2/3 VWS: CPT

## 2024-03-25 PROCEDURE — 72070 X-RAY EXAM THORAC SPINE 2VWS: CPT

## 2024-04-01 DIAGNOSIS — I47.19 PAT (PAROXYSMAL ATRIAL TACHYCARDIA) (HCC): ICD-10-CM

## 2024-04-02 RX ORDER — FLECAINIDE ACETATE 50 MG/1
50 TABLET ORAL 2 TIMES DAILY
Qty: 60 TABLET | Refills: 0 | Status: SHIPPED | OUTPATIENT
Start: 2024-04-02 | End: 2024-04-29

## 2024-04-02 RX ORDER — FLECAINIDE ACETATE 50 MG/1
50 TABLET ORAL 2 TIMES DAILY
Qty: 200 TABLET | Refills: 0 | OUTPATIENT
Start: 2024-04-02

## 2024-04-02 NOTE — TELEPHONE ENCOUNTER
RX refilled for 30 days.   FV scheduled 04/17/24 with NICK.     Schedulers: Please call for new patient with EP, there is a referral in there. Last saw SS on 03/26/2021. Thanks!

## 2024-04-11 ENCOUNTER — TELEPHONE (OUTPATIENT)
Dept: CARDIOLOGY | Facility: MEDICAL CENTER | Age: 79
End: 2024-04-11
Payer: MEDICARE

## 2024-04-11 NOTE — TELEPHONE ENCOUNTER
Caller: Shaniqua Solis     Topic/issue: Pt is returning call    Callback Number: [phone]     Thank You  Ghazala HAWKINS

## 2024-04-15 ENCOUNTER — HOSPITAL ENCOUNTER (OUTPATIENT)
Dept: LAB | Facility: MEDICAL CENTER | Age: 79
End: 2024-04-15
Attending: INTERNAL MEDICINE
Payer: MEDICARE

## 2024-04-15 LAB
ALBUMIN SERPL BCP-MCNC: 4 G/DL (ref 3.2–4.9)
ALBUMIN/GLOB SERPL: 1.4 G/DL
ALP SERPL-CCNC: 70 U/L (ref 30–99)
ALT SERPL-CCNC: 16 U/L (ref 2–50)
ANION GAP SERPL CALC-SCNC: 9 MMOL/L (ref 7–16)
AST SERPL-CCNC: 14 U/L (ref 12–45)
BASOPHILS # BLD AUTO: 1 % (ref 0–1.8)
BASOPHILS # BLD: 0.06 K/UL (ref 0–0.12)
BILIRUB SERPL-MCNC: 0.7 MG/DL (ref 0.1–1.5)
BUN SERPL-MCNC: 15 MG/DL (ref 8–22)
CALCIUM ALBUM COR SERPL-MCNC: 9.4 MG/DL (ref 8.5–10.5)
CALCIUM SERPL-MCNC: 9.4 MG/DL (ref 8.5–10.5)
CHLORIDE SERPL-SCNC: 101 MMOL/L (ref 96–112)
CHOLEST SERPL-MCNC: 183 MG/DL (ref 100–199)
CHOLEST SERPL-MCNC: 185 MG/DL (ref 100–199)
CO2 SERPL-SCNC: 24 MMOL/L (ref 20–33)
CREAT SERPL-MCNC: 0.57 MG/DL (ref 0.5–1.4)
EOSINOPHIL # BLD AUTO: 0.14 K/UL (ref 0–0.51)
EOSINOPHIL NFR BLD: 2.4 % (ref 0–6.9)
ERYTHROCYTE [DISTWIDTH] IN BLOOD BY AUTOMATED COUNT: 43.1 FL (ref 35.9–50)
EST. AVERAGE GLUCOSE BLD GHB EST-MCNC: 126 MG/DL
FASTING STATUS PATIENT QL REPORTED: NORMAL
FASTING STATUS PATIENT QL REPORTED: NORMAL
GFR SERPLBLD CREATININE-BSD FMLA CKD-EPI: 93 ML/MIN/1.73 M 2
GLOBULIN SER CALC-MCNC: 2.9 G/DL (ref 1.9–3.5)
GLUCOSE SERPL-MCNC: 115 MG/DL (ref 65–99)
HBA1C MFR BLD: 6 % (ref 4–5.6)
HCT VFR BLD AUTO: 38.8 % (ref 37–47)
HDLC SERPL-MCNC: 50 MG/DL
HDLC SERPL-MCNC: 51 MG/DL
HGB BLD-MCNC: 13.1 G/DL (ref 12–16)
IMM GRANULOCYTES # BLD AUTO: 0.02 K/UL (ref 0–0.11)
IMM GRANULOCYTES NFR BLD AUTO: 0.3 % (ref 0–0.9)
LDLC SERPL CALC-MCNC: 111 MG/DL
LDLC SERPL CALC-MCNC: 112 MG/DL
LYMPHOCYTES # BLD AUTO: 1.66 K/UL (ref 1–4.8)
LYMPHOCYTES NFR BLD: 29 % (ref 22–41)
MCH RBC QN AUTO: 30.3 PG (ref 27–33)
MCHC RBC AUTO-ENTMCNC: 33.8 G/DL (ref 32.2–35.5)
MCV RBC AUTO: 89.8 FL (ref 81.4–97.8)
MONOCYTES # BLD AUTO: 0.45 K/UL (ref 0–0.85)
MONOCYTES NFR BLD AUTO: 7.9 % (ref 0–13.4)
NEUTROPHILS # BLD AUTO: 3.4 K/UL (ref 1.82–7.42)
NEUTROPHILS NFR BLD: 59.4 % (ref 44–72)
NRBC # BLD AUTO: 0 K/UL
NRBC BLD-RTO: 0 /100 WBC (ref 0–0.2)
PLATELET # BLD AUTO: 234 K/UL (ref 164–446)
PMV BLD AUTO: 10.4 FL (ref 9–12.9)
POTASSIUM SERPL-SCNC: 4.4 MMOL/L (ref 3.6–5.5)
PROT SERPL-MCNC: 6.9 G/DL (ref 6–8.2)
RBC # BLD AUTO: 4.32 M/UL (ref 4.2–5.4)
SODIUM SERPL-SCNC: 134 MMOL/L (ref 135–145)
T3FREE SERPL-MCNC: 2.85 PG/ML (ref 2–4.4)
T4 FREE SERPL-MCNC: 1.1 NG/DL (ref 0.93–1.7)
TRIGL SERPL-MCNC: 109 MG/DL (ref 0–149)
TRIGL SERPL-MCNC: 111 MG/DL (ref 0–149)
TSH SERPL DL<=0.005 MIU/L-ACNC: 0.81 UIU/ML (ref 0.38–5.33)
WBC # BLD AUTO: 5.7 K/UL (ref 4.8–10.8)

## 2024-04-15 PROCEDURE — 83036 HEMOGLOBIN GLYCOSYLATED A1C: CPT

## 2024-04-15 PROCEDURE — 84481 FREE ASSAY (FT-3): CPT

## 2024-04-15 PROCEDURE — 80061 LIPID PANEL: CPT

## 2024-04-15 PROCEDURE — 85025 COMPLETE CBC W/AUTO DIFF WBC: CPT

## 2024-04-15 PROCEDURE — 84439 ASSAY OF FREE THYROXINE: CPT

## 2024-04-15 PROCEDURE — 84681 ASSAY OF C-PEPTIDE: CPT

## 2024-04-15 PROCEDURE — 80053 COMPREHEN METABOLIC PANEL: CPT

## 2024-04-15 PROCEDURE — 36415 COLL VENOUS BLD VENIPUNCTURE: CPT

## 2024-04-15 PROCEDURE — 84443 ASSAY THYROID STIM HORMONE: CPT

## 2024-04-15 PROCEDURE — 80061 LIPID PANEL: CPT | Mod: 91

## 2024-04-15 NOTE — RESULT ENCOUNTER NOTE
Vaishali your cholesterol panel overall is improved  We will go over the results at your upcoming appointment on 4/17/2024

## 2024-04-17 ENCOUNTER — OFFICE VISIT (OUTPATIENT)
Dept: CARDIOLOGY | Facility: MEDICAL CENTER | Age: 79
End: 2024-04-17
Attending: INTERNAL MEDICINE
Payer: MEDICARE

## 2024-04-17 VITALS
WEIGHT: 126 LBS | BODY MASS INDEX: 24.74 KG/M2 | HEIGHT: 60 IN | HEART RATE: 101 BPM | SYSTOLIC BLOOD PRESSURE: 102 MMHG | RESPIRATION RATE: 14 BRPM | OXYGEN SATURATION: 94 % | DIASTOLIC BLOOD PRESSURE: 68 MMHG

## 2024-04-17 DIAGNOSIS — I25.10 CORONARY ARTERY DISEASE INVOLVING NATIVE CORONARY ARTERY OF NATIVE HEART WITHOUT ANGINA PECTORIS: Chronic | ICD-10-CM

## 2024-04-17 DIAGNOSIS — Z79.899 LONG TERM CURRENT USE OF ANTIARRHYTHMIC MEDICAL THERAPY: ICD-10-CM

## 2024-04-17 DIAGNOSIS — I35.1 MILD AORTIC REGURGITATION: ICD-10-CM

## 2024-04-17 DIAGNOSIS — I35.0 MILD AORTIC STENOSIS: ICD-10-CM

## 2024-04-17 DIAGNOSIS — E78.00 HYPERCHOLESTEREMIA: Chronic | ICD-10-CM

## 2024-04-17 DIAGNOSIS — I47.19 PAT (PAROXYSMAL ATRIAL TACHYCARDIA) (HCC): ICD-10-CM

## 2024-04-17 DIAGNOSIS — I47.10 PAROXYSMAL SUPRAVENTRICULAR TACHYCARDIA (HCC): Chronic | ICD-10-CM

## 2024-04-17 LAB — C PEPTIDE SERPL-MCNC: 2.3 NG/ML (ref 0.5–3.3)

## 2024-04-17 PROCEDURE — 99212 OFFICE O/P EST SF 10 MIN: CPT | Performed by: INTERNAL MEDICINE

## 2024-04-17 PROCEDURE — 93005 ELECTROCARDIOGRAM TRACING: CPT | Performed by: INTERNAL MEDICINE

## 2024-04-17 PROCEDURE — 3078F DIAST BP <80 MM HG: CPT | Performed by: INTERNAL MEDICINE

## 2024-04-17 PROCEDURE — 3074F SYST BP LT 130 MM HG: CPT | Performed by: INTERNAL MEDICINE

## 2024-04-17 PROCEDURE — 99214 OFFICE O/P EST MOD 30 MIN: CPT | Performed by: INTERNAL MEDICINE

## 2024-04-17 ASSESSMENT — ENCOUNTER SYMPTOMS
LOSS OF CONSCIOUSNESS: 0
SHORTNESS OF BREATH: 0
MYALGIAS: 0
COUGH: 0
BRUISES/BLEEDS EASILY: 0
DIZZINESS: 0
PALPITATIONS: 0

## 2024-04-17 ASSESSMENT — FIBROSIS 4 INDEX: FIB4 SCORE: 1.166666666666666667

## 2024-04-17 NOTE — PROGRESS NOTES
"Chief Complaint   Patient presents with    Paroxysmal Atrial Tachycardia (PAT)    Supraventricular Tachycardia (SVT)     F/v Dx:Paroxysmal supraventricular tachycardia (HCC)    Palpitations       Subjective     Vaishali Solis is a 78 y.o. female who presents today today for follow-up cardiac care.    The patient has CAD, palpitations, PAT, CAD, mild aortic regurgitation with nonsustained VT and hyperlipidemia.    Since 10/24/2023 appointment the patient has had no cardiac symptoms including chest pain, palpitations, shortness of breath.  Tolerating atorvastatin that was started at last appointment.    No history of hypertension.  No diabetes mellitus.    Past Medical History:   Diagnosis Date    Anesthesia     \"couldn't open eye or talk\"    Arrhythmia     PSVT    CAD (coronary artery disease) 11/10/2010    Cardiac arrhythmia     Chickenpox     Crohns disease     Diarrhea     Dizziness 2/2/2010    GERD (gastroesophageal reflux disease)     Heart burn     Heartburn     High cholesterol     Hypercholesteremia 11/10/2010    Indigestion     Influenza     Mumps     Murmur 11/10/2010    Osteoporosis     Palpitations 11/10/2010    Paroxysmal supraventricular tachycardia (HCC) 11/10/2010    Pneumonia     Shortness of breath     Tonsillitis     Unspecified cataract     IOL OD     Past Surgical History:   Procedure Laterality Date    KNEE ARTHROSCOPY Left 7/6/2017    Procedure: KNEE ARTHROSCOPY;  Surgeon: Al Heredia M.D.;  Location: [facility];  Service:     MENISCECTOMY, KNEE, MEDIAL Left 7/6/2017    Procedure: MEDIAL MENISCECTOMY - PARTIAL, MUIR;  Surgeon: Al Heredia M.D.;  Location: [facility];  Service:     CATARACT PHACO WITH IOL Right 6/22/2015    Procedure: CATARACT PHACO WITH IOL;  Surgeon: David Llanos M.D.;  Location: [facility];  Service:     ABDOMINOPLASTY  2015    and face lift    INGUINAL HERNIA REPAIR Right 2014    CARPAL TUNNEL RELEASE  2012    " SALPINGOSTOMY Bilateral     repair of tubes    ARTHROSCOPY, KNEE      PRIMARY C SECTION  ,      Family History   Problem Relation Age of Onset    Cancer Mother         Breast    Heart Attack Father          at age 83 of heart attack.    Heart Attack Brother         heart attack at age 41 and bypass surgery    Heart Attack Brother         bypass surgery at age 62    Cancer Maternal Aunt         Breast     Social History     Socioeconomic History    Marital status:      Spouse name: Not on file    Number of children: Not on file    Years of education: Not on file    Highest education level: Not on file   Occupational History    Occupation:      Employer: OTHER   Tobacco Use    Smoking status: Never    Smokeless tobacco: Never   Vaping Use    Vaping Use: Never used   Substance and Sexual Activity    Alcohol use: Yes     Comment: Rarely    Drug use: Never    Sexual activity: Not on file   Other Topics Concern    Not on file   Social History Narrative    Not on file     Social Determinants of Health     Financial Resource Strain: Not on file   Food Insecurity: Not on file   Transportation Needs: Not on file   Physical Activity: Not on file   Stress: Not on file   Social Connections: Not on file   Intimate Partner Violence: Not on file   Housing Stability: Not on file     No Known Allergies  Outpatient Encounter Medications as of 2024   Medication Sig Dispense Refill    flecainide (TAMBOCOR) 50 MG tablet TAKE 1 TABLET BY MOUTH TWO TIMES A DAY 60 Tablet 0    spironolactone (ALDACTONE) 25 MG Tab TAKE 1 TABLET BY MOUTH DAILY 100 Tablet 2    atorvastatin (LIPITOR) 40 MG Tab Take 1 Tablet by mouth 1/2 hour before dinner. 100 Tablet 3    estradiol (ESTRACE) 0.1 MG/GM vaginal cream       clindamycin (CLEOCIN) 150 MG Cap       esomeprazole (NEXIUM) 40 MG delayed-release capsule every day.      alendronate (FOSAMAX) 35 MG tablet every 7 days.      XIIDRA 5 % Solution       aspirin 81 MG  tablet Take 81 mg by mouth every evening.      [DISCONTINUED] azithromycin (ZITHROMAX) 250 MG Tab       [DISCONTINUED] valACYclovir (VALTREX) 500 MG Tab  (Patient not taking: Reported on 3/10/2024)       No facility-administered encounter medications on file as of 4/17/2024.     Review of Systems   Respiratory:  Negative for cough and shortness of breath.    Cardiovascular:  Negative for chest pain and palpitations.   Genitourinary:  Negative for hematuria.   Musculoskeletal:  Negative for myalgias.   Neurological:  Negative for dizziness and loss of consciousness.   Endo/Heme/Allergies:  Does not bruise/bleed easily.              Objective     /68 (BP Location: Left arm, Patient Position: Sitting, BP Cuff Size: Adult)   Pulse (!) 101   Resp 14   Ht 1.524 m (5')   Wt 57.2 kg (126 lb)   SpO2 94%   BMI 24.61 kg/m²     Physical Exam  Constitutional:       General: She is not in acute distress.  Neck:      Vascular: No JVD.   Cardiovascular:      Rate and Rhythm: Normal rate and regular rhythm.      Pulses: Normal pulses.      Heart sounds: Murmur heard.      No friction rub. No gallop.   Pulmonary:      Effort: Pulmonary effort is normal. No accessory muscle usage or respiratory distress.      Breath sounds: Normal breath sounds. No wheezing or rales.   Abdominal:      General: There is no distension.      Palpations: Abdomen is soft. There is no mass.      Tenderness: There is no abdominal tenderness.   Musculoskeletal:      Right lower leg: No edema.      Left lower leg: No edema.   Skin:     General: Skin is warm and dry.      Findings: No rash.      Nails: There is no clubbing.   Neurological:      Mental Status: She is alert and oriented to person, place, and time.   Psychiatric:         Behavior: Behavior normal.                    MPI 02/17/2012  1)   Normal exercise Cardiolite stress test with no evidence of  ischemia or infarction.   2)  Normal left ventricular systolic function with a  calculated  ejection fraction of 86%.   3)  No chest pain reported, and no ischemic EKG changes seen.   4)  Average exercise tolerance.   5)  No prior nuclear stress test available for comparison.       MPI 01/05/2017  Normal myocardial perfusion with no ischemia.   Normal left ventricular wall motion.  LV ejection fraction = 73%.   ECG INTERPRETATION   Negative stress ECG for ischemia.     MPI 11/3/2020   No evidence of significant jeopardized viable myocardium or prior myocardial infarction.   Normal left ventricular size, ejection fraction, and wall motion.   ECG INTERPRETATION   Negative stress ECG for ischemia.     01/23/2003 CARDIAC CATHETERIZATION  EF 67%.  50-70% diagonal stenosis.  Treated medically.     11/02/2016 HOLTER MONITOR  Normal sinus rhythm.  Multiple episodes of SVT.  Nonsustained VT.    TREADMILL 6/2/2021  Patient exercised for 5 minutes, 10 seconds, and 7.1 METs.   100% of MPHR: 145   90% of MPHR: 131   85% of MPHR: 123   Peak Heart Rate Achieved: 118   82 % of MPHR.   Maximum /60 mmHg   Test was terminated due to: Severe shortness of breath   Nondiagnostic stress test for ischemia, did not achieve target heart rate.   Test terminated due to shortness of breath.     ECHOCARDIOGRAM 11/29/2010  EF 84%. Mild LVH. 1-2+ AR. 1+ MR. PSP 22-27 mmHg.     ECHOCARDIOGRAM 01/05/2017  Normal left ventricular systolic function.  Moderate concentric left ventricular hypertrophy.  Left ventricular ejection fraction is visually estimated to be 70%.  Aortic sclerosis without stenosis.  Mild aortic insufficiency.  Right heart pressures are normal.  No prior study is available for comparison.     ECHOCARDIOGRAM 12/28/2018  Mild concentric left ventricular hypertrophy.  Left ventricular ejection fraction is visually estimated to be 70%.  Normal regional wall motion.  Grade II diastolic dysfunction.  Mild aortic insufficiency.  Mild mitral regurgitation..  Estimated right ventricular systolic pressure  is 40   mmHg.     ECHOCARDIOGRAM 12/11/2020  Normal left ventricular chamber size.  Left ventricular ejection fraction is visually estimated to be 70%.  Moderate eccentric left ventricular hypertrophy.  Grade II diastolic dysfunction.  Mild mitral regurgitation.  Mild aortic stenosis. Vmax is 2.4  m/s.  Normal right ventricular size and systolic function.  Normal inferior vena cava size and inspiratory collapse.  Estimated right ventricular systolic pressure  is 37 mmHg.  Compared to the prior study done -  there has been development of mils   aortic stenosis and an increase in LV thickness.    ECHOCARDIOGRAM 12/11/2020  Normal left ventricular chamber size.  Left ventricular ejection fraction is visually estimated to be 70%.  Moderate eccentric left ventricular hypertrophy.  Grade II diastolic dysfunction.  Mild mitral regurgitation.  Mild aortic stenosis. Vmax is 2.4  m/s.  Normal right ventricular size and systolic function.  Normal inferior vena cava size and inspiratory collapse.  Estimated right ventricular systolic pressure  is 37 mmHg.  Compared to the prior study done -  there has been development of mild   aortic stenosis and an increase in LV thickness.    ECHOCARDIOGRAM 12/15/2021  Normal left ventricular chamber size.  The left ventricular ejection fraction is visually estimated to be 70%.  Aortic sclerosis without stenosis.  Mild aortic insufficiency.  Normal right ventricular size and systolic function.  Right heart pressures are normal.    EKG 4/17/2024 sinus rhythm rate 79 left anterior fascicular block, personally interpreted    Assessment & Plan     1. Coronary artery disease involving native coronary artery of native heart without angina pectoris        2. Hypercholesteremia        3. Mild aortic regurgitation        4. Mild aortic stenosis        5. PAT (paroxysmal atrial tachycardia) (HCC)  EKG      6. Paroxysmal supraventricular tachycardia (HCC)        7. Long term current use of antiarrhythmic  medical therapy            Medical Decision Making: Today's Assessment/Status/Plan:   Assessment  PAT, SVT.   CAD. Diagonal stenosis 2003.  Aortic stenosis, mild.  Aortic regurgitation.  Mild.  Hypercholesterolemia.  Nonsustained ventricular tachycardia.  Left ventricular hypertrophy  Beta-blocker induced SOB.     Recommendations and Discussion  CAD, no ischemic symptoms, on atorvastatin, aspirin, did not tolerate beta-blockers  Aortic stenosis/regurgitation, mild, asymptomatic, continue to monitor, echocardiogram as needed  Dyslipidemia, , improved, atorvastatin 40 mg daily  PAT/PSVT on flecainide, c no clinical recurrence, annot tolerate beta-blockers due to MATAMOROS which resolved after discontinuation, has not seen EP in over 2 years, will refer back for recommendations concerning antiarrhythmic therapy  LVH with CRISSY, I assured her of her concerns about prior reported diastolic dysfunction  RTC 6 months, sooner if necessary                      Mustarde Flap Text: The defect edges were debeveled with a #15 scalpel blade.  Given the size, depth and location of the defect and the proximity to free margins a Mustarde flap was deemed most appropriate.  Using a sterile surgical marker, an appropriate flap was drawn incorporating the defect. The area thus outlined was incised with a #15 scalpel blade.  The skin margins were undermined to an appropriate distance in all directions utilizing iris scissors.

## 2024-04-18 ENCOUNTER — HOSPITAL ENCOUNTER (OUTPATIENT)
Dept: RADIOLOGY | Facility: MEDICAL CENTER | Age: 79
End: 2024-04-18
Attending: FAMILY MEDICINE
Payer: MEDICARE

## 2024-04-18 ENCOUNTER — HOSPITAL ENCOUNTER (OUTPATIENT)
Dept: RADIOLOGY | Facility: MEDICAL CENTER | Age: 79
End: 2024-04-18
Attending: ORTHOPAEDIC SURGERY
Payer: MEDICARE

## 2024-04-18 DIAGNOSIS — M54.6 PAIN IN THORACIC SPINE: ICD-10-CM

## 2024-04-18 DIAGNOSIS — M54.50 LOW BACK PAIN, UNSPECIFIED BACK PAIN LATERALITY, UNSPECIFIED CHRONICITY, UNSPECIFIED WHETHER SCIATICA PRESENT: ICD-10-CM

## 2024-04-18 LAB — EKG IMPRESSION: NORMAL

## 2024-04-18 PROCEDURE — 72110 X-RAY EXAM L-2 SPINE 4/>VWS: CPT

## 2024-04-18 PROCEDURE — 93010 ELECTROCARDIOGRAM REPORT: CPT | Performed by: INTERNAL MEDICINE

## 2024-04-29 ENCOUNTER — TELEPHONE (OUTPATIENT)
Dept: HEALTH INFORMATION MANAGEMENT | Facility: OTHER | Age: 79
End: 2024-04-29
Payer: MEDICARE

## 2024-04-29 DIAGNOSIS — I47.19 PAT (PAROXYSMAL ATRIAL TACHYCARDIA) (HCC): ICD-10-CM

## 2024-04-29 RX ORDER — FLECAINIDE ACETATE 50 MG/1
TABLET ORAL
Qty: 180 TABLET | Refills: 1 | Status: SHIPPED | OUTPATIENT
Start: 2024-04-29

## 2024-05-30 DIAGNOSIS — E78.00 HYPERCHOLESTEREMIA: Chronic | ICD-10-CM

## 2024-05-30 DIAGNOSIS — R00.2 PALPITATIONS: Chronic | ICD-10-CM

## 2024-05-30 DIAGNOSIS — I25.10 CORONARY ARTERY DISEASE, NON-OCCLUSIVE: ICD-10-CM

## 2024-05-30 RX ORDER — ATORVASTATIN CALCIUM 40 MG/1
40 TABLET, FILM COATED ORAL
Qty: 100 TABLET | Refills: 3 | Status: SHIPPED | OUTPATIENT
Start: 2024-05-30

## 2024-05-30 RX ORDER — ATORVASTATIN CALCIUM 40 MG/1
40 TABLET, FILM COATED ORAL
Qty: 100 TABLET | Refills: 3 | Status: SHIPPED
Start: 2024-05-30 | End: 2024-05-30

## 2024-05-30 NOTE — TELEPHONE ENCOUNTER
Is the patient due for a refill? Yes    Was the patient seen the past year? Yes    Date of last office visit: 4/17/24    Does the patient have an upcoming appointment?  Yes   If yes, When? 6/4/24    Provider to refill:NICK    Does the patients insurance require a 100 day supply?  Yes

## 2024-06-04 ENCOUNTER — OFFICE VISIT (OUTPATIENT)
Dept: CARDIOLOGY | Facility: MEDICAL CENTER | Age: 79
End: 2024-06-04
Attending: INTERNAL MEDICINE
Payer: MEDICARE

## 2024-06-04 VITALS
OXYGEN SATURATION: 97 % | DIASTOLIC BLOOD PRESSURE: 60 MMHG | HEART RATE: 82 BPM | WEIGHT: 128.2 LBS | SYSTOLIC BLOOD PRESSURE: 100 MMHG | HEIGHT: 60 IN | RESPIRATION RATE: 16 BRPM | BODY MASS INDEX: 25.17 KG/M2

## 2024-06-04 DIAGNOSIS — Z79.899 ENCOUNTER FOR MONITORING FLECAINIDE THERAPY: ICD-10-CM

## 2024-06-04 DIAGNOSIS — Z51.81 ENCOUNTER FOR MONITORING FLECAINIDE THERAPY: ICD-10-CM

## 2024-06-04 DIAGNOSIS — I47.10 PAROXYSMAL SUPRAVENTRICULAR TACHYCARDIA (HCC): Chronic | ICD-10-CM

## 2024-06-04 LAB — EKG IMPRESSION: NORMAL

## 2024-06-04 PROCEDURE — 3074F SYST BP LT 130 MM HG: CPT | Performed by: INTERNAL MEDICINE

## 2024-06-04 PROCEDURE — 3078F DIAST BP <80 MM HG: CPT | Performed by: INTERNAL MEDICINE

## 2024-06-04 PROCEDURE — 93005 ELECTROCARDIOGRAM TRACING: CPT | Performed by: INTERNAL MEDICINE

## 2024-06-04 PROCEDURE — 99214 OFFICE O/P EST MOD 30 MIN: CPT | Mod: 25 | Performed by: INTERNAL MEDICINE

## 2024-06-04 PROCEDURE — 99212 OFFICE O/P EST SF 10 MIN: CPT | Performed by: INTERNAL MEDICINE

## 2024-06-04 ASSESSMENT — FIBROSIS 4 INDEX: FIB4 SCORE: 1.166666666666666667

## 2024-06-04 NOTE — PROGRESS NOTES
"Arrhythmia Clinic Note (Established patient)    DOS: 6/4/2024    Chief complaint/Reason for consult: F/u Flec    Interval History:  Pt is a 77 yo F. History of symptomatic ectopy. She has a history of \"CAD\" but this was a ?diagonal stenosis per chart and not major epicardial artery. She has been tolerating flecainide at low doses and working well for her. Lost to f/u. Here to re-establish f/u and for flec monitoring. No complaints today.     ROS (+ highlighted in red):  General--Negative for fatigue, weight loss or weight gain  Cardiovascular--Negative for CP, orthopnea, PND    Past Medical History:   Diagnosis Date    Anesthesia     \"couldn't open eye or talk\"    Arrhythmia     PSVT    CAD (coronary artery disease) 11/10/2010    Cardiac arrhythmia     Chickenpox     Crohns disease     Diarrhea     Dizziness 2/2/2010    GERD (gastroesophageal reflux disease)     Heart burn     Heartburn     High cholesterol     Hypercholesteremia 11/10/2010    Indigestion     Influenza     Mumps     Murmur 11/10/2010    Osteoporosis     Palpitations 11/10/2010    Paroxysmal supraventricular tachycardia (HCC) 11/10/2010    Pneumonia     Shortness of breath     Tonsillitis     Unspecified cataract     IOL OD       Past Surgical History:   Procedure Laterality Date    KNEE ARTHROSCOPY Left 7/6/2017    Procedure: KNEE ARTHROSCOPY;  Surgeon: Al Heredia M.D.;  Location: [facility];  Service:     MENISCECTOMY, KNEE, MEDIAL Left 7/6/2017    Procedure: MEDIAL MENISCECTOMY - PARTIAL, MUIR;  Surgeon: Al Heredia M.D.;  Location: [facility];  Service:     CATARACT PHACO WITH IOL Right 6/22/2015    Procedure: CATARACT PHACO WITH IOL;  Surgeon: David Llanos M.D.;  Location: [facility];  Service:     ABDOMINOPLASTY  2015    and face lift    INGUINAL HERNIA REPAIR Right 2014    CARPAL TUNNEL RELEASE  2012    SALPINGOSTOMY Bilateral 1975    repair of tubes    ARTHROSCOPY, KNEE      PRIMARY C " SECTION  ,        Social History     Socioeconomic History    Marital status:      Spouse name: Not on file    Number of children: Not on file    Years of education: Not on file    Highest education level: Not on file   Occupational History    Occupation:      Employer: OTHER   Tobacco Use    Smoking status: Never    Smokeless tobacco: Never   Vaping Use    Vaping status: Never Used   Substance and Sexual Activity    Alcohol use: Yes     Comment: Rarely    Drug use: Never    Sexual activity: Not on file   Other Topics Concern    Not on file   Social History Narrative    Not on file     Social Determinants of Health     Financial Resource Strain: Not on file   Food Insecurity: Not on file   Transportation Needs: Not on file   Physical Activity: Not on file   Stress: Not on file   Social Connections: Not on file   Intimate Partner Violence: Not on file   Housing Stability: Not on file       Family History   Problem Relation Age of Onset    Cancer Mother         Breast    Heart Attack Father          at age 83 of heart attack.    Heart Attack Brother         heart attack at age 41 and bypass surgery    Heart Attack Brother         bypass surgery at age 62    Cancer Maternal Aunt         Breast       No Known Allergies    Current Outpatient Medications   Medication Sig Dispense Refill    atorvastatin (LIPITOR) 40 MG Tab Take 1 Tablet by mouth 1/2 hour before dinner. 100 Tablet 3    flecainide (TAMBOCOR) 50 MG tablet TAKE ONE TABLET BY MOUTH TWICE A DAY (TAMBACOR) 180 Tablet 1    spironolactone (ALDACTONE) 25 MG Tab TAKE 1 TABLET BY MOUTH DAILY 100 Tablet 2    estradiol (ESTRACE) 0.1 MG/GM vaginal cream       esomeprazole (NEXIUM) 40 MG delayed-release capsule every day.      alendronate (FOSAMAX) 35 MG tablet every 7 days.      XIIDRA 5 % Solution       aspirin 81 MG tablet Take 81 mg by mouth every evening.      clindamycin (CLEOCIN) 150 MG Cap        No current facility-administered  medications for this visit.       Physical Exam:  Vitals:    06/04/24 1521   BP: 100/60   BP Location: Left arm   Patient Position: Sitting   BP Cuff Size: Adult   Pulse: 82   Resp: 16   SpO2: 97%   Weight: 58.2 kg (128 lb 3.2 oz)   Height: 1.524 m (5')     General appearance: NAD, conversant  HEENT: PERRL, neck is supple with FROM  Lungs: Clear to auscultation, normal respiratory effort  CV: RRR, no murmurs/rubs/gallops, no JVD  Abdomen: Soft, non-tender with normal bowel sounds  Extremities: No peripheral edema, no clubbing or cyanosis  Skin: No rash, lesions, or ulcers  Psych: Alert and oriented to person, place and time    Data:  Labs reviewed    Prior echo/stress reviewed:  LVEF 70%, mild AI    2020 nuc med study showing normal perfusion    EKG interpreted by me:  Sinus, stable narrow QRS    Impression/Plan:  1. Paroxysmal supraventricular tachycardia (HCC)  EKG      2. Encounter for monitoring flecainide therapy          -Doing well on flec  -No changes  -Continue 50 BID dose  -She had a question regarding diastolic dysfunction, she has ?mild HFPEF symptoms, and I think the aldactone reasonable to continue for this  -F/u in 12 mo    Eugene Vela MD

## 2024-06-06 ENCOUNTER — APPOINTMENT (OUTPATIENT)
Dept: RADIOLOGY | Facility: MEDICAL CENTER | Age: 79
End: 2024-06-06
Attending: ORTHOPAEDIC SURGERY
Payer: MEDICARE

## 2024-06-14 ENCOUNTER — HOSPITAL ENCOUNTER (OUTPATIENT)
Dept: RADIOLOGY | Facility: MEDICAL CENTER | Age: 79
End: 2024-06-14
Attending: ORTHOPAEDIC SURGERY
Payer: MEDICARE

## 2024-06-14 DIAGNOSIS — M54.50 LOW BACK PAIN, UNSPECIFIED BACK PAIN LATERALITY, UNSPECIFIED CHRONICITY, UNSPECIFIED WHETHER SCIATICA PRESENT: ICD-10-CM

## 2024-06-14 PROCEDURE — 72148 MRI LUMBAR SPINE W/O DYE: CPT

## 2024-07-18 DIAGNOSIS — I47.19 PAT (PAROXYSMAL ATRIAL TACHYCARDIA) (HCC): ICD-10-CM

## 2024-07-18 DIAGNOSIS — I51.89 DIASTOLIC DYSFUNCTION: ICD-10-CM

## 2024-07-19 RX ORDER — FLECAINIDE ACETATE 50 MG/1
50 TABLET ORAL 2 TIMES DAILY
Qty: 180 TABLET | Refills: 1 | Status: SHIPPED | OUTPATIENT
Start: 2024-07-19

## 2024-07-19 RX ORDER — SPIRONOLACTONE 25 MG/1
25 TABLET ORAL DAILY
Qty: 100 TABLET | Refills: 3 | Status: SHIPPED | OUTPATIENT
Start: 2024-07-19

## 2024-07-22 LAB — EKG IMPRESSION: NORMAL

## 2024-08-26 ENCOUNTER — PATIENT MESSAGE (OUTPATIENT)
Dept: HEALTH INFORMATION MANAGEMENT | Facility: OTHER | Age: 79
End: 2024-08-26

## 2024-10-02 ENCOUNTER — HOSPITAL ENCOUNTER (OUTPATIENT)
Dept: LAB | Facility: MEDICAL CENTER | Age: 79
End: 2024-10-02
Attending: INTERNAL MEDICINE
Payer: MEDICARE

## 2024-10-02 ENCOUNTER — OFFICE VISIT (OUTPATIENT)
Dept: CARDIOLOGY | Facility: MEDICAL CENTER | Age: 79
End: 2024-10-02
Attending: INTERNAL MEDICINE
Payer: MEDICARE

## 2024-10-02 VITALS
HEIGHT: 60 IN | SYSTOLIC BLOOD PRESSURE: 122 MMHG | OXYGEN SATURATION: 97 % | DIASTOLIC BLOOD PRESSURE: 74 MMHG | WEIGHT: 128 LBS | HEART RATE: 82 BPM | RESPIRATION RATE: 15 BRPM | BODY MASS INDEX: 25.13 KG/M2

## 2024-10-02 DIAGNOSIS — R06.09 DYSPNEA ON EXERTION: ICD-10-CM

## 2024-10-02 DIAGNOSIS — I25.10 CORONARY ARTERY DISEASE, NON-OCCLUSIVE: ICD-10-CM

## 2024-10-02 DIAGNOSIS — I47.10 PAROXYSMAL SUPRAVENTRICULAR TACHYCARDIA (HCC): Chronic | ICD-10-CM

## 2024-10-02 DIAGNOSIS — Z79.899 HIGH RISK MEDICATIONS (NOT ANTICOAGULANTS) LONG-TERM USE: ICD-10-CM

## 2024-10-02 DIAGNOSIS — I47.19 PAT (PAROXYSMAL ATRIAL TACHYCARDIA) (HCC): ICD-10-CM

## 2024-10-02 LAB
EKG IMPRESSION: NORMAL
NT-PROBNP SERPL IA-MCNC: 444 PG/ML (ref 0–125)

## 2024-10-02 PROCEDURE — 36415 COLL VENOUS BLD VENIPUNCTURE: CPT

## 2024-10-02 PROCEDURE — 93010 ELECTROCARDIOGRAM REPORT: CPT | Performed by: INTERNAL MEDICINE

## 2024-10-02 PROCEDURE — 3078F DIAST BP <80 MM HG: CPT | Performed by: INTERNAL MEDICINE

## 2024-10-02 PROCEDURE — 93005 ELECTROCARDIOGRAM TRACING: CPT | Performed by: INTERNAL MEDICINE

## 2024-10-02 PROCEDURE — 83880 ASSAY OF NATRIURETIC PEPTIDE: CPT

## 2024-10-02 PROCEDURE — 3074F SYST BP LT 130 MM HG: CPT | Performed by: INTERNAL MEDICINE

## 2024-10-02 PROCEDURE — 99214 OFFICE O/P EST MOD 30 MIN: CPT | Performed by: INTERNAL MEDICINE

## 2024-10-02 PROCEDURE — 99212 OFFICE O/P EST SF 10 MIN: CPT | Performed by: INTERNAL MEDICINE

## 2024-10-02 RX ORDER — FUROSEMIDE 20 MG/1
20 TABLET ORAL
Qty: 90 TABLET | Refills: 3 | Status: SHIPPED | OUTPATIENT
Start: 2024-10-02 | End: 2024-10-02 | Stop reason: SDUPTHER

## 2024-10-02 RX ORDER — FUROSEMIDE 20 MG/1
20 TABLET ORAL
Qty: 90 TABLET | Refills: 3 | Status: SHIPPED | OUTPATIENT
Start: 2024-10-02

## 2024-10-02 ASSESSMENT — FIBROSIS 4 INDEX: FIB4 SCORE: 1.18

## 2024-10-03 ENCOUNTER — HOSPITAL ENCOUNTER (OUTPATIENT)
Dept: CARDIOLOGY | Facility: MEDICAL CENTER | Age: 79
End: 2024-10-03
Attending: INTERNAL MEDICINE
Payer: MEDICARE

## 2024-10-03 DIAGNOSIS — R06.09 DYSPNEA ON EXERTION: ICD-10-CM

## 2024-10-03 LAB
LV EJECT FRACT  99904: 67
LV EJECT FRACT MOD 2C 99903: 71.02
LV EJECT FRACT MOD 4C 99902: 64.33
LV EJECT FRACT MOD BP 99901: 67.45

## 2024-10-03 PROCEDURE — 93306 TTE W/DOPPLER COMPLETE: CPT

## 2024-10-03 PROCEDURE — 93306 TTE W/DOPPLER COMPLETE: CPT | Mod: 26 | Performed by: INTERNAL MEDICINE

## 2024-10-04 ENCOUNTER — TELEPHONE (OUTPATIENT)
Dept: CARDIOLOGY | Facility: MEDICAL CENTER | Age: 79
End: 2024-10-04
Payer: MEDICARE

## 2024-10-08 ENCOUNTER — APPOINTMENT (OUTPATIENT)
Dept: CARDIOLOGY | Facility: MEDICAL CENTER | Age: 79
End: 2024-10-08
Attending: INTERNAL MEDICINE
Payer: MEDICARE

## 2024-10-14 ENCOUNTER — APPOINTMENT (OUTPATIENT)
Dept: RADIOLOGY | Facility: MEDICAL CENTER | Age: 79
End: 2024-10-14
Attending: INTERNAL MEDICINE
Payer: MEDICARE

## 2024-10-28 ENCOUNTER — TELEPHONE (OUTPATIENT)
Dept: CARDIOLOGY | Facility: MEDICAL CENTER | Age: 79
End: 2024-10-28
Payer: MEDICARE

## 2024-10-28 DIAGNOSIS — I25.10 CORONARY ARTERY DISEASE INVOLVING NATIVE CORONARY ARTERY OF NATIVE HEART WITHOUT ANGINA PECTORIS: ICD-10-CM

## 2024-10-28 DIAGNOSIS — Z79.899 ENCOUNTER FOR MONITORING FLECAINIDE THERAPY: ICD-10-CM

## 2024-10-28 DIAGNOSIS — I25.10 CORONARY ARTERY DISEASE, NON-OCCLUSIVE: ICD-10-CM

## 2024-10-28 DIAGNOSIS — Z51.81 ENCOUNTER FOR MONITORING FLECAINIDE THERAPY: ICD-10-CM

## 2024-10-28 DIAGNOSIS — I35.1 MILD AORTIC REGURGITATION: ICD-10-CM

## 2024-10-29 ENCOUNTER — TELEPHONE (OUTPATIENT)
Dept: CARDIOLOGY | Facility: MEDICAL CENTER | Age: 79
End: 2024-10-29
Payer: MEDICARE

## 2024-11-14 ENCOUNTER — PATIENT MESSAGE (OUTPATIENT)
Dept: CARDIOLOGY | Facility: MEDICAL CENTER | Age: 79
End: 2024-11-14
Payer: MEDICARE

## 2024-11-21 ENCOUNTER — HOSPITAL ENCOUNTER (OUTPATIENT)
Dept: RADIOLOGY | Facility: MEDICAL CENTER | Age: 79
End: 2024-11-21
Attending: INTERNAL MEDICINE
Payer: MEDICARE

## 2024-11-21 ENCOUNTER — TELEPHONE (OUTPATIENT)
Dept: CARDIOLOGY | Facility: MEDICAL CENTER | Age: 79
End: 2024-11-21

## 2024-11-21 DIAGNOSIS — Z51.81 ENCOUNTER FOR MONITORING FLECAINIDE THERAPY: ICD-10-CM

## 2024-11-21 DIAGNOSIS — R94.39 ABNORMAL CARDIOVASCULAR STRESS TEST: ICD-10-CM

## 2024-11-21 DIAGNOSIS — I35.1 MILD AORTIC REGURGITATION: ICD-10-CM

## 2024-11-21 DIAGNOSIS — I25.10 CORONARY ARTERY DISEASE DUE TO LIPID RICH PLAQUE: ICD-10-CM

## 2024-11-21 DIAGNOSIS — I25.83 CORONARY ARTERY DISEASE DUE TO LIPID RICH PLAQUE: ICD-10-CM

## 2024-11-21 DIAGNOSIS — Z79.899 ENCOUNTER FOR MONITORING FLECAINIDE THERAPY: ICD-10-CM

## 2024-11-21 DIAGNOSIS — I25.10 CORONARY ARTERY DISEASE, NON-OCCLUSIVE: ICD-10-CM

## 2024-11-21 PROCEDURE — 700111 HCHG RX REV CODE 636 W/ 250 OVERRIDE (IP)

## 2024-11-21 PROCEDURE — A9502 TC99M TETROFOSMIN: HCPCS

## 2024-11-21 PROCEDURE — 78452 HT MUSCLE IMAGE SPECT MULT: CPT | Mod: 26 | Performed by: INTERNAL MEDICINE

## 2024-11-21 PROCEDURE — 93018 CV STRESS TEST I&R ONLY: CPT | Performed by: INTERNAL MEDICINE

## 2024-11-21 RX ORDER — REGADENOSON 0.08 MG/ML
0.4 INJECTION, SOLUTION INTRAVENOUS ONCE
Status: COMPLETED | OUTPATIENT
Start: 2024-11-21 | End: 2024-11-21

## 2024-11-21 RX ORDER — REGADENOSON 0.08 MG/ML
INJECTION, SOLUTION INTRAVENOUS
Status: COMPLETED
Start: 2024-11-21 | End: 2024-11-21

## 2024-11-21 RX ORDER — AMINOPHYLLINE 25 MG/ML
100 INJECTION, SOLUTION INTRAVENOUS
Status: DISCONTINUED | OUTPATIENT
Start: 2024-11-21 | End: 2024-11-22 | Stop reason: HOSPADM

## 2024-11-21 RX ADMIN — REGADENOSON 0.4 MG: 0.08 INJECTION, SOLUTION INTRAVENOUS at 09:05

## 2024-11-22 ENCOUNTER — TELEPHONE (OUTPATIENT)
Dept: CARDIOLOGY | Facility: MEDICAL CENTER | Age: 79
End: 2024-11-22
Payer: MEDICARE

## 2024-11-22 ENCOUNTER — APPOINTMENT (OUTPATIENT)
Dept: ADMISSIONS | Facility: MEDICAL CENTER | Age: 79
End: 2024-11-22
Attending: INTERNAL MEDICINE
Payer: MEDICARE

## 2024-11-22 NOTE — TELEPHONE ENCOUNTER
----- Message from Physician Mitch Patton M.D. sent at 11/21/2024  6:31 PM PST -----  Call and confirm if she is okay to do cath

## 2024-11-22 NOTE — TELEPHONE ENCOUNTER
Scheduled C w/ poss per Dr. ISRAEL once Jacque or Bradley RN's  speaks to patient. Emailed  Orders to Bradley for  To sign.

## 2024-11-22 NOTE — TELEPHONE ENCOUNTER
To Marvin, please call pt to schedule angiogram, ordered.  Thank you!    Called pt [phone] to review CW recommendations.  Pt verbalizes understanding and states she would like to proceed with angiogram.  Vaishali states no other concerns or questions at this time and is appreciative of information given.    ==================================    ----- Message from Physician Mitch Patton M.D. sent at 11/21/2024  6:31 PM PST -----  Call and confirm if she is okay to do cath

## 2024-11-22 NOTE — TELEPHONE ENCOUNTER
Patient is scheduled on 11-25-24 for a C w/poss with . No meds to stop and patient to check in at 7:30 for a 9:30 procedure.Updated H&P to be done on admit by NP. Pre admit to call patient.

## 2024-11-22 NOTE — TELEPHONE ENCOUNTER
Phone Number Called: [phone]     Call outcome: Spoke to patient regarding message below.    Message: Called to inform patient of CW recommendations. Patient verbalized understanding. Per CW, patient to stop Flecainide. No further questions at this time.         -------------------------  Mitch Patton M.D. Regarding result: NM-CARDIAC STRESS TEST       11/21/24  6:31 PM  Result Comment  The stress test does suggest you have an area of the heart that needs improvement in the arteries.  You should stop taking flecainide.  I would recommend you do an angiogram which is an outpatient procedure similar to your prior angiogram but anticipate that he would need to put in a stent this time.  If you would like to discuss this in person we can certainly arrange that otherwise my office will call to schedule the angiogram.          ----- Message from Marvin DUBOIS sent at 11/22/2024  7:21 AM PST -----  Regarding: Cath  Please let me know once you have spoken to patient and she wants to do procedure.TY  ----- Message -----  From: Mitch Patton M.D.  Sent: 11/21/2024   6:31 PM PST  To: Marvin Vasquez; Jacque Hurley R.N.    Call and confirm if she is okay to do cath

## 2024-11-25 ENCOUNTER — HOSPITAL ENCOUNTER (OUTPATIENT)
Facility: MEDICAL CENTER | Age: 79
End: 2024-11-26
Attending: INTERNAL MEDICINE | Admitting: INTERNAL MEDICINE
Payer: MEDICARE

## 2024-11-25 ENCOUNTER — APPOINTMENT (OUTPATIENT)
Dept: CARDIOLOGY | Facility: MEDICAL CENTER | Age: 79
End: 2024-11-25
Payer: MEDICARE

## 2024-11-25 ENCOUNTER — APPOINTMENT (OUTPATIENT)
Dept: CARDIOLOGY | Facility: MEDICAL CENTER | Age: 79
End: 2024-11-25
Attending: INTERNAL MEDICINE
Payer: MEDICARE

## 2024-11-25 DIAGNOSIS — I25.10 CORONARY ARTERY DISEASE DUE TO LIPID RICH PLAQUE: ICD-10-CM

## 2024-11-25 DIAGNOSIS — R94.39 ABNORMAL CARDIOVASCULAR STRESS TEST: ICD-10-CM

## 2024-11-25 DIAGNOSIS — I25.83 CORONARY ARTERY DISEASE DUE TO LIPID RICH PLAQUE: ICD-10-CM

## 2024-11-25 DIAGNOSIS — I25.10 CORONARY ARTERY DISEASE DUE TO CALCIFIED CORONARY LESION: ICD-10-CM

## 2024-11-25 DIAGNOSIS — I25.84 CORONARY ARTERY DISEASE DUE TO CALCIFIED CORONARY LESION: ICD-10-CM

## 2024-11-25 LAB
ACT BLD: 135 SEC (ref 74–137)
ALBUMIN SERPL BCP-MCNC: 3.8 G/DL (ref 3.2–4.9)
ALBUMIN/GLOB SERPL: 1.2 G/DL
ALP SERPL-CCNC: 83 U/L (ref 30–99)
ALT SERPL-CCNC: 16 U/L (ref 2–50)
ANION GAP SERPL CALC-SCNC: 9 MMOL/L (ref 7–16)
APTT PPP: 29.4 SEC (ref 24.7–36)
AST SERPL-CCNC: 18 U/L (ref 12–45)
BILIRUB SERPL-MCNC: 0.7 MG/DL (ref 0.1–1.5)
BUN SERPL-MCNC: 12 MG/DL (ref 8–22)
CALCIUM ALBUM COR SERPL-MCNC: 9.3 MG/DL (ref 8.5–10.5)
CALCIUM SERPL-MCNC: 9.1 MG/DL (ref 8.5–10.5)
CHLORIDE SERPL-SCNC: 100 MMOL/L (ref 96–112)
CO2 SERPL-SCNC: 25 MMOL/L (ref 20–33)
CREAT SERPL-MCNC: 0.64 MG/DL (ref 0.5–1.4)
EKG IMPRESSION: NORMAL
EKG IMPRESSION: NORMAL
ERYTHROCYTE [DISTWIDTH] IN BLOOD BY AUTOMATED COUNT: 42.7 FL (ref 35.9–50)
GFR SERPLBLD CREATININE-BSD FMLA CKD-EPI: 90 ML/MIN/1.73 M 2
GLOBULIN SER CALC-MCNC: 3.1 G/DL (ref 1.9–3.5)
GLUCOSE SERPL-MCNC: 107 MG/DL (ref 65–99)
HCT VFR BLD AUTO: 37.4 % (ref 37–47)
HGB BLD-MCNC: 12.6 G/DL (ref 12–16)
INR PPP: 1 (ref 0.87–1.13)
MCH RBC QN AUTO: 30.7 PG (ref 27–33)
MCHC RBC AUTO-ENTMCNC: 33.7 G/DL (ref 32.2–35.5)
MCV RBC AUTO: 91.2 FL (ref 81.4–97.8)
PLATELET # BLD AUTO: 251 K/UL (ref 164–446)
PMV BLD AUTO: 10.1 FL (ref 9–12.9)
POTASSIUM SERPL-SCNC: 3.7 MMOL/L (ref 3.6–5.5)
PROT SERPL-MCNC: 6.9 G/DL (ref 6–8.2)
PROTHROMBIN TIME: 13.2 SEC (ref 12–14.6)
RBC # BLD AUTO: 4.1 M/UL (ref 4.2–5.4)
SODIUM SERPL-SCNC: 134 MMOL/L (ref 135–145)
WBC # BLD AUTO: 6.3 K/UL (ref 4.8–10.8)

## 2024-11-25 PROCEDURE — 700102 HCHG RX REV CODE 250 W/ 637 OVERRIDE(OP): Performed by: INTERNAL MEDICINE

## 2024-11-25 PROCEDURE — 93005 ELECTROCARDIOGRAM TRACING: CPT | Performed by: INTERNAL MEDICINE

## 2024-11-25 PROCEDURE — 85347 COAGULATION TIME ACTIVATED: CPT

## 2024-11-25 PROCEDURE — 93010 ELECTROCARDIOGRAM REPORT: CPT | Mod: 59 | Performed by: INTERNAL MEDICINE

## 2024-11-25 PROCEDURE — A9270 NON-COVERED ITEM OR SERVICE: HCPCS | Performed by: INTERNAL MEDICINE

## 2024-11-25 PROCEDURE — 99223 1ST HOSP IP/OBS HIGH 75: CPT | Mod: FS | Performed by: THORACIC SURGERY (CARDIOTHORACIC VASCULAR SURGERY)

## 2024-11-25 PROCEDURE — 80053 COMPREHEN METABOLIC PANEL: CPT

## 2024-11-25 PROCEDURE — 700105 HCHG RX REV CODE 258: Performed by: INTERNAL MEDICINE

## 2024-11-25 PROCEDURE — 99152 MOD SED SAME PHYS/QHP 5/>YRS: CPT | Mod: 76 | Performed by: INTERNAL MEDICINE

## 2024-11-25 PROCEDURE — 700102 HCHG RX REV CODE 250 W/ 637 OVERRIDE(OP)

## 2024-11-25 PROCEDURE — 92928 PRQ TCAT PLMT NTRAC ST 1 LES: CPT | Mod: LC | Performed by: INTERNAL MEDICINE

## 2024-11-25 PROCEDURE — 99152 MOD SED SAME PHYS/QHP 5/>YRS: CPT

## 2024-11-25 PROCEDURE — 85027 COMPLETE CBC AUTOMATED: CPT

## 2024-11-25 PROCEDURE — 92929 PR PRQ TRLUML CORONARY STENT W/ANGIO ADDL ART/BRNCH: CPT | Mod: LD | Performed by: INTERNAL MEDICINE

## 2024-11-25 PROCEDURE — 700117 HCHG RX CONTRAST REV CODE 255: Performed by: INTERNAL MEDICINE

## 2024-11-25 PROCEDURE — 85610 PROTHROMBIN TIME: CPT

## 2024-11-25 PROCEDURE — 92979 ENDOLUMINL IVUS OCT C EA: CPT | Mod: 26,LD | Performed by: INTERNAL MEDICINE

## 2024-11-25 PROCEDURE — 99153 MOD SED SAME PHYS/QHP EA: CPT

## 2024-11-25 PROCEDURE — 700111 HCHG RX REV CODE 636 W/ 250 OVERRIDE (IP): Performed by: INTERNAL MEDICINE

## 2024-11-25 PROCEDURE — 85730 THROMBOPLASTIN TIME PARTIAL: CPT

## 2024-11-25 PROCEDURE — 96365 THER/PROPH/DIAG IV INF INIT: CPT | Mod: XU

## 2024-11-25 PROCEDURE — 700101 HCHG RX REV CODE 250

## 2024-11-25 PROCEDURE — 160002 HCHG RECOVERY MINUTES (STAT)

## 2024-11-25 PROCEDURE — 99204 OFFICE O/P NEW MOD 45 MIN: CPT | Performed by: INTERNAL MEDICINE

## 2024-11-25 PROCEDURE — C9600 PERC DRUG-EL COR STENT SING: HCPCS | Mod: LD

## 2024-11-25 PROCEDURE — 92979 ENDOLUMINL IVUS OCT C EA: CPT

## 2024-11-25 PROCEDURE — 93005 ELECTROCARDIOGRAM TRACING: CPT | Mod: XE | Performed by: INTERNAL MEDICINE

## 2024-11-25 PROCEDURE — 96366 THER/PROPH/DIAG IV INF ADDON: CPT | Mod: XU

## 2024-11-25 PROCEDURE — A9270 NON-COVERED ITEM OR SERVICE: HCPCS

## 2024-11-25 PROCEDURE — 92978 ENDOLUMINL IVUS OCT C 1ST: CPT | Mod: 26,LC | Performed by: INTERNAL MEDICINE

## 2024-11-25 PROCEDURE — 160035 HCHG PACU - 1ST 60 MINS PHASE I

## 2024-11-25 PROCEDURE — G0378 HOSPITAL OBSERVATION PER HR: HCPCS

## 2024-11-25 PROCEDURE — 93458 L HRT ARTERY/VENTRICLE ANGIO: CPT | Mod: 26,59 | Performed by: INTERNAL MEDICINE

## 2024-11-25 PROCEDURE — 700111 HCHG RX REV CODE 636 W/ 250 OVERRIDE (IP): Mod: JZ

## 2024-11-25 RX ORDER — HEPARIN SODIUM 200 [USP'U]/100ML
INJECTION, SOLUTION INTRAVENOUS
Status: COMPLETED
Start: 2024-11-25 | End: 2024-11-25

## 2024-11-25 RX ORDER — ASPIRIN 81 MG/1
81 TABLET ORAL DAILY
Status: DISCONTINUED | OUTPATIENT
Start: 2024-11-26 | End: 2024-11-26 | Stop reason: HOSPADM

## 2024-11-25 RX ORDER — MIDAZOLAM HYDROCHLORIDE 1 MG/ML
INJECTION INTRAMUSCULAR; INTRAVENOUS
Status: COMPLETED
Start: 2024-11-25 | End: 2024-11-25

## 2024-11-25 RX ORDER — AMOXICILLIN 250 MG
2 CAPSULE ORAL EVERY EVENING
Status: DISCONTINUED | OUTPATIENT
Start: 2024-11-25 | End: 2024-11-26 | Stop reason: HOSPADM

## 2024-11-25 RX ORDER — SODIUM CHLORIDE 9 MG/ML
1.5 INJECTION, SOLUTION INTRAVENOUS CONTINUOUS
Status: ACTIVE | OUTPATIENT
Start: 2024-11-25 | End: 2024-11-25

## 2024-11-25 RX ORDER — HEPARIN SODIUM 1000 [USP'U]/ML
INJECTION, SOLUTION INTRAVENOUS; SUBCUTANEOUS
Status: COMPLETED
Start: 2024-11-25 | End: 2024-11-25

## 2024-11-25 RX ORDER — VERAPAMIL HYDROCHLORIDE 2.5 MG/ML
INJECTION, SOLUTION INTRAVENOUS
Status: COMPLETED
Start: 2024-11-25 | End: 2024-11-25

## 2024-11-25 RX ORDER — ACETAMINOPHEN 325 MG/1
650 TABLET ORAL EVERY 6 HOURS PRN
Status: DISCONTINUED | OUTPATIENT
Start: 2024-11-25 | End: 2024-11-26 | Stop reason: HOSPADM

## 2024-11-25 RX ORDER — BIVALIRUDIN 250 MG/5ML
INJECTION, POWDER, LYOPHILIZED, FOR SOLUTION INTRAVENOUS
Status: COMPLETED
Start: 2024-11-25 | End: 2024-11-25

## 2024-11-25 RX ORDER — POLYETHYLENE GLYCOL 3350 17 G/17G
1 POWDER, FOR SOLUTION ORAL
Status: DISCONTINUED | OUTPATIENT
Start: 2024-11-25 | End: 2024-11-26 | Stop reason: HOSPADM

## 2024-11-25 RX ORDER — LIDOCAINE HYDROCHLORIDE 20 MG/ML
INJECTION, SOLUTION INFILTRATION; PERINEURAL
Status: COMPLETED
Start: 2024-11-25 | End: 2024-11-25

## 2024-11-25 RX ORDER — ATORVASTATIN CALCIUM 20 MG/1
80 TABLET, FILM COATED ORAL EVERY EVENING
Status: DISCONTINUED | OUTPATIENT
Start: 2024-11-25 | End: 2024-11-26 | Stop reason: HOSPADM

## 2024-11-25 RX ORDER — CLOPIDOGREL BISULFATE 75 MG/1
75 TABLET ORAL DAILY
Status: DISCONTINUED | OUTPATIENT
Start: 2024-11-26 | End: 2024-11-26 | Stop reason: HOSPADM

## 2024-11-25 RX ORDER — ASPIRIN 81 MG/1
81 TABLET ORAL DAILY
Status: DISCONTINUED | OUTPATIENT
Start: 2024-11-25 | End: 2024-11-25

## 2024-11-25 RX ORDER — CLOPIDOGREL 300 MG/1
600 TABLET, FILM COATED ORAL ONCE
Status: DISCONTINUED | OUTPATIENT
Start: 2024-11-25 | End: 2024-11-25

## 2024-11-25 RX ORDER — CLOPIDOGREL 300 MG/1
TABLET, FILM COATED ORAL
Status: COMPLETED
Start: 2024-11-25 | End: 2024-11-25

## 2024-11-25 RX ADMIN — MIDAZOLAM HYDROCHLORIDE 1 MG: 1 INJECTION, SOLUTION INTRAMUSCULAR; INTRAVENOUS at 09:45

## 2024-11-25 RX ADMIN — HEPARIN SODIUM: 1000 INJECTION, SOLUTION INTRAVENOUS; SUBCUTANEOUS at 09:02

## 2024-11-25 RX ADMIN — VERAPAMIL HYDROCHLORIDE 2.5 MG: 2.5 INJECTION, SOLUTION INTRAVENOUS at 09:00

## 2024-11-25 RX ADMIN — FENTANYL CITRATE 100 MCG: 50 INJECTION, SOLUTION INTRAMUSCULAR; INTRAVENOUS at 15:29

## 2024-11-25 RX ADMIN — CLOPIDOGREL BISULFATE 600 MG: 300 TABLET, FILM COATED ORAL at 14:55

## 2024-11-25 RX ADMIN — VERAPAMIL HYDROCHLORIDE 2.5 MG: 2.5 INJECTION, SOLUTION INTRAVENOUS at 14:50

## 2024-11-25 RX ADMIN — NITROGLYCERIN: 20 INJECTION INTRAVENOUS at 09:00

## 2024-11-25 RX ADMIN — ATORVASTATIN CALCIUM 80 MG: 20 TABLET, FILM COATED ORAL at 17:39

## 2024-11-25 RX ADMIN — HEPARIN SODIUM 2000 UNITS: 200 INJECTION, SOLUTION INTRAVENOUS at 14:52

## 2024-11-25 RX ADMIN — BIVALIRUDIN 1.75 MG/KG/HR: 250 INJECTION, POWDER, LYOPHILIZED, FOR SOLUTION INTRAVENOUS at 15:32

## 2024-11-25 RX ADMIN — HEPARIN SODIUM: 1000 INJECTION, SOLUTION INTRAVENOUS; SUBCUTANEOUS at 14:50

## 2024-11-25 RX ADMIN — ASPIRIN 81 MG: 81 TABLET, COATED ORAL at 11:15

## 2024-11-25 RX ADMIN — FENTANYL CITRATE 50 MCG: 50 INJECTION, SOLUTION INTRAMUSCULAR; INTRAVENOUS at 09:45

## 2024-11-25 RX ADMIN — BIVALIRUDIN 0.2 MG/KG/HR: 250 INJECTION, POWDER, LYOPHILIZED, FOR SOLUTION INTRAVENOUS at 17:30

## 2024-11-25 RX ADMIN — IOHEXOL 100 ML: 350 INJECTION, SOLUTION INTRAVENOUS at 16:11

## 2024-11-25 RX ADMIN — MIDAZOLAM HYDROCHLORIDE 2 MG: 1 INJECTION, SOLUTION INTRAMUSCULAR; INTRAVENOUS at 15:22

## 2024-11-25 RX ADMIN — NITROGLYCERIN 10 ML: 20 INJECTION INTRAVENOUS at 14:50

## 2024-11-25 RX ADMIN — MIDAZOLAM HYDROCHLORIDE 1 MG: 1 INJECTION, SOLUTION INTRAMUSCULAR; INTRAVENOUS at 16:10

## 2024-11-25 RX ADMIN — MIDAZOLAM HYDROCHLORIDE 2 MG: 1 INJECTION, SOLUTION INTRAMUSCULAR; INTRAVENOUS at 15:41

## 2024-11-25 RX ADMIN — LIDOCAINE HYDROCHLORIDE: 20 INJECTION, SOLUTION INFILTRATION; PERINEURAL at 09:00

## 2024-11-25 RX ADMIN — LIDOCAINE HYDROCHLORIDE: 20 INJECTION, SOLUTION INFILTRATION; PERINEURAL at 14:50

## 2024-11-25 RX ADMIN — HEPARIN SODIUM 2000 ML: 200 INJECTION, SOLUTION INTRAVENOUS at 09:00

## 2024-11-25 ASSESSMENT — COGNITIVE AND FUNCTIONAL STATUS - GENERAL
SUGGESTED CMS G CODE MODIFIER MOBILITY: CH
SUGGESTED CMS G CODE MODIFIER DAILY ACTIVITY: CH
DAILY ACTIVITIY SCORE: 24
MOBILITY SCORE: 24

## 2024-11-25 ASSESSMENT — ENCOUNTER SYMPTOMS
GASTROINTESTINAL NEGATIVE: 1
PALPITATIONS: 0
CONSTITUTIONAL NEGATIVE: 1
NEUROLOGICAL NEGATIVE: 1
MUSCULOSKELETAL NEGATIVE: 1
EYES NEGATIVE: 1
MYALGIAS: 1
PSYCHIATRIC NEGATIVE: 1
SHORTNESS OF BREATH: 1

## 2024-11-25 ASSESSMENT — FIBROSIS 4 INDEX: FIB4 SCORE: 1.18

## 2024-11-25 NOTE — PROCEDURES
"CARDIAC CATHETERIZATION REPORT    REFERRING: Mitch Patton M.D.    PROCEDURE PHYSICIAN: Ceasar Schwartz MD, [facility], [facility]  ASSISTANT: None    IMPRESSIONS:  1.  Accelerating angina with obstructive multivessel disease  2.  Mild aortic stenosis  3.  Moderate elevation in LVEDP at 24 mmHg    Recommendations:  Admit for CABG consultation    Pre-procedure diagnosis: Unstable angina  Post-procedure diagnosis: Same    Procedure performed  Selective coronary angiography  Left heart catheterization    Conscious sedation was supervised by myself and administered by trained personnel using fentanyl and versed between 941 and 957. The patient tolerated sedation without complication.     Procedure Description  1. Access: 5/6 [place] right radial artery Micropuncture technique was utilized following local anesthesia with lidocaine.  A radial cocktail containing verapamil and saline was administered in the radial artery sheath    2. Diagnostic description: The catheter was passed to the central circulation with the aide of J tipped 0.35\" wire. A 5F TIG 4.0 and 6F Pigtail were used to inject the coronary circulation and inject the left ventricle during invasive hemodynamic monitoring.     3. Closing: At completion of the procedure the relevant equipment was removed from the body and hemostasis achieved by Radial band    Findings   Hemodynamics:   Aorta: 100/67 mmHg  LVEDP: 128/24 mmHg  Aortic valve peak to peak gradient: 25 mmHg    Coronary Anatomy   Left Main:  Normal   LAD:  80% proximal stenosis.  Minimal luminal irregularities at the midportion of the vessel.  The first diagonal is a large vessel with mid 90% stenosis   LCx: Dominant, proximal 50% stenosis.  The mid segment has a 90% stenosis just prior to the only significant obtuse marginal vessel which has proximal 30% stenosis of the lower limb . Left PDA is normal   RCA: Non-dominant, Minimal luminal irregularities     Left Ventriculography:   LVEF= 45%. Global " hypokinesis    Technical Factors  1. Complications: None  2. Estimated Blood Loss: <50 cc  3. Specimens: None  4. Contrast Volume: 45 ml  5. Medications: Radial cocktail (Verapamil 2.5 mg, Nitroglycerin 100 mcg) Heparin 5000 Units

## 2024-11-25 NOTE — ASSESSMENT & PLAN NOTE
Cardiac catheterization today showed multivessel coronary artery disease  Patient declined CABG  Cardiology is following  Status post PCI  Monitor on telemetry  Maximize medical management  
Check A1c  Diabetic diet  
Continue PPI  
History of.  Seen on cardiac catheterization  
Switch from outpatient simvastatin to high intensity atorvastatin  
Was on flecainide as outpatient  Monitor on telemetry  
unk

## 2024-11-25 NOTE — OR NURSING
1010 - Patient arrival to [facility]PU after left heart cath. Assessed patient with Jose SANTO. Pt awake and alert. TR band to right wrist is C/D/soft. VSS. Denies numbness or tingling to right hand. Denies pain at this time. Educated patient on wrist precautions and plan of care  1015 - belongings returned to patient at bedside  1023 - updated patient's daughter Feli  1030 - family at bedside  1120 - 2 cc air removed from TR, remains C/D/soft  1135 - small oozing to site  1150 - 2 cc air removed from TR, site dry/soft  1205 - 2 cc air removed from TR, site dry/soft  1215 - Dr. Taylor at bedside  1240 - 3 cc air removed from TR, site dry/soft  1255 - 3 cc air removed from TR, site dry/soft  1310 - 1 cc air removed from TR, site dry/soft  1325 - TR band removed and gauze and tegaderm dressing placed  1451 - patient taken back to cath lab with Cassidy SANTO for stent placement.

## 2024-11-25 NOTE — CONSULTS
Hospital Medicine Consultation    Date of Service  11/25/2024    Referring Physician  Ceasar Schwartz M.D.    Consulting Physician  Samuel Cerrato D.O.    Reason for Consultation  Medical management    History of Presenting Illness  79 y.o. female with a history of coronary artery disease, PSVT on flecainide, hyperlipidemia who presented 11/25/2024 with for worsening shortness of breath, chest tightness.  Patient presented for elective cardiac catheterization which showed multivessel coronary artery disease, mild aortic stenosis.  CT surgery was consulted for expedited CABG which patient declined reporting she wanted to try stents first.      Status post complex PCI with stent placement to the LAD, diagonal, circumflex    Hospital medicine was consulted for medical management.  Patient reports some pain in her right wrist at catheter access site and her chest feels uncomfortable, but much better than prior.  Otherwise denies fever, chills, shortness of breath, palpitations, dizziness/lightheadedness    Review of Systems  Review of Systems   Respiratory:  Positive for shortness of breath.    Cardiovascular:  Positive for chest pain. Negative for palpitations.   Musculoskeletal:  Positive for myalgias.       Past Medical History   has a past medical history of Anesthesia, Arrhythmia, CAD (coronary artery disease) (11/10/2010), Cardiac arrhythmia, Chickenpox, Crohns disease, Diarrhea, Dizziness (2/2/2010), GERD (gastroesophageal reflux disease), Heart burn, Heartburn, High cholesterol, Hypercholesteremia (11/10/2010), Indigestion, Influenza, Mumps, Murmur (11/10/2010), Osteoporosis, Palpitations (11/10/2010), Paroxysmal supraventricular tachycardia (HCC) (11/10/2010), Pneumonia, Shortness of breath, Tonsillitis, and Unspecified cataract.    Surgical History   has a past surgical history that includes primary c section (1977, 1985); inguinal hernia repair (Right, 2014); abdominoplasty (2015); cataract phaco with iol  (Right, 6/22/2015); carpal tunnel release (2012); salpingostomy (Bilateral, 1975); knee arthroscopy (Left, 7/6/2017); meniscectomy, knee, medial (Left, 7/6/2017); and arthroscopy, knee.    Family History  family history includes Cancer in her maternal aunt and mother; Heart Attack in her brother, brother, and father.    Social History   reports that she has never smoked. She has never used smokeless tobacco. She reports that she does not currently use alcohol. She reports that she does not use drugs.    Medications  Prior to Admission Medications   Prescriptions Last Dose Informant Patient Reported? Taking?   XIIDRA 5 % Solution  Patient Yes No   alendronate (FOSAMAX) 35 MG tablet  Patient Yes No   Sig: every 7 days.   Patient not taking: Reported on 11/22/2024   aspirin 81 MG tablet 11/24/2024 Evening Patient Yes Yes   Sig: Take 81 mg by mouth every evening.   esomeprazole (NEXIUM) 40 MG delayed-release capsule 11/25/2024 at  6:30 AM Patient Yes Yes   Sig: every day.   estradiol (ESTRACE) 0.1 MG/GM vaginal cream  Patient Yes No   flecainide (TAMBOCOR) 50 MG tablet 11/25/2024 at  6:30 AM Patient Yes Yes   Sig: Take 1 Tablet by mouth 2 times a day.   furosemide (LASIX) 20 MG Tab 11/23/2024 Patient No No   Sig: Take 1 Tablet by mouth 1 time a day as needed (weight gain of 2 lbs).   pantoprazole (PROTONIX) 40 MG Tablet Delayed Response  Patient Yes No   Sig: Take 1 Tablet by mouth every day.   simvastatin (ZOCOR) 40 MG Tab 11/24/2024 Evening Patient Yes Yes   Sig: Take 40 mg by mouth every evening.   spironolactone (ALDACTONE) 25 MG Tab 11/24/2024 Patient No Yes   Sig: Take 1 Tablet by mouth every day.      Facility-Administered Medications: None       Allergies  No Known Allergies    Physical Exam  Temp:  [36.3 °C (97.3 °F)-36.4 °C (97.6 °F)] 36.3 °C (97.3 °F)  Pulse:  [66-89] 68  Resp:  [16-18] 16  BP: (133-158)/(71-85) 133/71  SpO2:  [94 %-100 %] 94 %    Physical Exam  Vitals and nursing note reviewed.    Constitutional:       Appearance: Normal appearance. She is ill-appearing.   HENT:      Head: Normocephalic and atraumatic.      Right Ear: External ear normal.      Left Ear: External ear normal.      Nose: Nose normal.      Mouth/Throat:      Mouth: Mucous membranes are moist.      Pharynx: Oropharynx is clear. No oropharyngeal exudate or posterior oropharyngeal erythema.   Eyes:      Extraocular Movements: Extraocular movements intact.      Conjunctiva/sclera: Conjunctivae normal.   Cardiovascular:      Rate and Rhythm: Normal rate and regular rhythm.      Pulses: Normal pulses.      Heart sounds: Normal heart sounds. No murmur heard.  Pulmonary:      Effort: Pulmonary effort is normal. No respiratory distress.      Breath sounds: Normal breath sounds. No stridor. No wheezing or rales.   Abdominal:      General: Abdomen is flat. Bowel sounds are normal. There is no distension.      Palpations: Abdomen is soft. There is no mass.      Tenderness: There is no abdominal tenderness.   Musculoskeletal:      Cervical back: Normal range of motion.      Comments: Right wrist catheter access site with band   Skin:     General: Skin is warm.      Capillary Refill: Capillary refill takes less than 2 seconds.   Neurological:      General: No focal deficit present.      Mental Status: She is alert and oriented to person, place, and time. Mental status is at baseline.      Cranial Nerves: No cranial nerve deficit.   Psychiatric:         Mood and Affect: Mood normal.         Behavior: Behavior normal.         Fluids      Laboratory  Recent Labs     11/25/24  0820   WBC 6.3   RBC 4.10*   HEMOGLOBIN 12.6   HEMATOCRIT 37.4   MCV 91.2   MCH 30.7   MCHC 33.7   RDW 42.7   PLATELETCT 251   MPV 10.1     Recent Labs     11/25/24  0820   SODIUM 134*   POTASSIUM 3.7   CHLORIDE 100   CO2 25   GLUCOSE 107*   BUN 12   CREATININE 0.64   CALCIUM 9.1     Recent Labs     11/25/24  0820   APTT 29.4   INR 1.00                  Imaging      Assessment/Plan  * Coronary artery disease due to calcified coronary lesion- (present on admission)  Assessment & Plan  Cardiac catheterization today showed multivessel coronary artery disease  Patient declined CABG  Cardiology is following  Status post PCI  Monitor on telemetry  Maximize medical management    Prediabetes- (present on admission)  Assessment & Plan  Check A1c  Diabetic diet    Mild aortic stenosis- (present on admission)  Assessment & Plan  History of.  Seen on cardiac catheterization    GERD (gastroesophageal reflux disease)- (present on admission)  Assessment & Plan  Continue PPI    Paroxysmal supraventricular tachycardia (HCC)- (present on admission)  Assessment & Plan  Was on flecainide as outpatient  Monitor on telemetry    Hypercholesteremia- (present on admission)  Assessment & Plan  Switch from outpatient simvastatin to high intensity atorvastatin

## 2024-11-25 NOTE — CONSULTS
REFERRING PHYSICIAN: Ceasar Schwartz MD.     CONSULTING PHYSICIAN: Brian Taylor DO     CHIEF COMPLAINT: Shortness of breath     HISTORY OF PRESENT ILLNESS: The patient is a 79 y.o. female with history of hyperlipidemia and PSVT- on flecainide. She complained of increasing shortness of breath with exertion. She also complained of associated chest tightness. She had a stress test that was abnormal and brought in for elective cardiac catheterization that showed multi-vessel disease. She denies lower extremity edema, dizziness, syncope, orthopnea, or PND. She was admitted for further work-up and cardiac surgery consultation.     PAST MEDICAL HISTORY:   Active Ambulatory Problems     Diagnosis Date Noted    Coronary artery disease due to lipid rich plaque - mild on angio 2002 11/10/2010    Hypercholesteremia 11/10/2010    Palpitations 11/10/2010    Paroxysmal supraventricular tachycardia (HCC) 11/10/2010    Cataract, right eye 06/22/2015    Coronary artery disease, non-occlusive 11/30/2016    PAT (paroxysmal atrial tachycardia) (HCC) 02/15/2017    Complex tear of medial meniscus of left knee as current injury 07/06/2017    GERD (gastroesophageal reflux disease) 12/28/2018    Long term current use of antiarrhythmic medical therapy 02/13/2019    MATAMOROS (dyspnea on exertion) 06/02/2021    Mild aortic regurgitation 06/02/2021    Mild aortic stenosis 06/02/2021    Left ventricular hypertrophy 08/31/2021    Osteopenia 04/20/2022    Atherosclerosis of abdominal aorta (HCC) 04/20/2022    Prediabetes 04/20/2022    BMI 25.0-25.9,adult 04/20/2022    BMI 24.0-24.9, adult 05/01/2023    Herpes simplex infection of genitourinary system 05/01/2023    History of pulmonary hypertension 05/01/2023    Abnormal cardiovascular stress test 11/21/2024     Resolved Ambulatory Problems     Diagnosis Date Noted    Dizziness 02/02/2010    Murmur 11/10/2010    CHEST PAIN 02/06/2012    Near syncope 11/30/2016    Hypokalemia 12/28/2018     Past  Medical History:   Diagnosis Date    Anesthesia     Arrhythmia     CAD (coronary artery disease) 11/10/2010    Cardiac arrhythmia     Chickenpox     Crohns disease     Diarrhea     Heart burn     Heartburn     High cholesterol     Indigestion     Influenza     Mumps     Osteoporosis     Pneumonia     Shortness of breath     Tonsillitis     Unspecified cataract      PAST SURGICAL HISTORY:   Past Surgical History:   Procedure Laterality Date    KNEE ARTHROSCOPY Left 2017    Procedure: KNEE ARTHROSCOPY;  Surgeon: Al Heredia M.D.;  Location: SURGERY [facility];  Service:     MENISCECTOMY, KNEE, MEDIAL Left 2017    Procedure: MEDIAL MENISCECTOMY - PARTIAL, MUIR;  Surgeon: Al Heredia M.D.;  Location: SURGERY [facility];  Service:     CATARACT PHACO WITH IOL Right 2015    Procedure: CATARACT PHACO WITH IOL;  Surgeon: David Llanos M.D.;  Location: SURGERY [facility];  Service:     ABDOMINOPLASTY  2015    and face lift    INGUINAL HERNIA REPAIR Right 2014    CARPAL TUNNEL RELEASE  2012    SALPINGOSTOMY Bilateral     repair of tubes    ARTHROSCOPY, KNEE      PRIMARY C SECTION  ,      ALLERGIES: No Known Allergies     CURRENT MEDICATIONS:   Current Facility-Administered Medications:     NS infusion, 1.5 mL/kg/hr, Intravenous, Continuous, Ceasar Schwartz M.D.    aspirin EC tablet 81 mg, 81 mg, Oral, DAILY, Ceasar Schwartz M.D.    FAMILY HISTORY:   Family History   Problem Relation Age of Onset    Cancer Mother         Breast    Heart Attack Father          at age 83 of heart attack.    Heart Attack Brother         heart attack at age 41 and bypass surgery    Heart Attack Brother         bypass surgery at age 62    Cancer Maternal Aunt         Breast      SOCIAL HISTORY:   Social History     Socioeconomic History    Marital status:      Spouse name: Not on file    Number of children: Not on file    Years of education: Not on file    Highest education level:  Not on file   Occupational History    Occupation:      Employer: OTHER   Tobacco Use    Smoking status: Never    Smokeless tobacco: Never   Vaping Use    Vaping status: Never Used   Substance and Sexual Activity    Alcohol use: Not Currently     Comment: Rarely    Drug use: Never    Sexual activity: Not on file   Other Topics Concern    Not on file   Social History Narrative    Not on file     Social Drivers of Health     Financial Resource Strain: Not on file   Food Insecurity: Not on file   Transportation Needs: Not on file   Physical Activity: Not on file   Stress: Not on file   Social Connections: Not on file   Intimate Partner Violence: Not on file   Housing Stability: Not on file     REVIEW OF SYSTEMS:  Review of Systems   Constitutional: Negative.    HENT: Negative.     Eyes: Negative.    Respiratory:  Positive for shortness of breath.    Cardiovascular:  Positive for chest pain.   Gastrointestinal: Negative.    Genitourinary: Negative.    Musculoskeletal: Negative.    Skin: Negative.    Neurological: Negative.    Endo/Heme/Allergies: Negative.    Psychiatric/Behavioral: Negative.       PHYSICAL EXAMINATION:    BP (!) 143/74   Pulse 71   Temp 36.3 °C (97.3 °F) (Temporal)   Resp 17   Ht 1.524 m (5')   Wt 58.4 kg (128 lb 12 oz)   SpO2 98%   BMI 25.14 kg/m²    Physical Exam  Constitutional:       General: She is not in acute distress.     Appearance: Normal appearance.   HENT:      Head: Normocephalic and atraumatic.      Nose: Nose normal.      Mouth/Throat:      Pharynx: Uvula midline.   Eyes:      Pupils: Pupils are equal, round, and reactive to light.   Neck:      Vascular: No carotid bruit or JVD.   Cardiovascular:      Rate and Rhythm: Normal rate and regular rhythm.      Heart sounds: Normal heart sounds.   Pulmonary:      Breath sounds: Normal breath sounds.   Abdominal:      General: Bowel sounds are normal. There is no distension.      Palpations: Abdomen is soft.    Musculoskeletal:         General: Normal range of motion.   Skin:     General: Skin is warm and dry.   Neurological:      Mental Status: She is alert and oriented to person, place, and time.       LABS REVIEWED:  Lab Results   Component Value Date/Time    SODIUM 134 (L) 11/25/2024 08:20 AM    POTASSIUM 3.7 11/25/2024 08:20 AM    CHLORIDE 100 11/25/2024 08:20 AM    CO2 25 11/25/2024 08:20 AM    GLUCOSE 107 (H) 11/25/2024 08:20 AM    BUN 12 11/25/2024 08:20 AM    CREATININE 0.64 11/25/2024 08:20 AM      Lab Results   Component Value Date/Time    PROTHROMBTM 13.2 11/25/2024 08:20 AM    INR 1.00 11/25/2024 08:20 AM      Lab Results   Component Value Date/Time    WBC 6.3 11/25/2024 08:20 AM    RBC 4.10 (L) 11/25/2024 08:20 AM    HEMOGLOBIN 12.6 11/25/2024 08:20 AM    HEMATOCRIT 37.4 11/25/2024 08:20 AM    MCV 91.2 11/25/2024 08:20 AM    MCH 30.7 11/25/2024 08:20 AM    MCHC 33.7 11/25/2024 08:20 AM    MPV 10.1 11/25/2024 08:20 AM    NEUTSPOLYS 59.40 04/15/2024 08:42 AM    LYMPHOCYTES 29.00 04/15/2024 08:42 AM    MONOCYTES 7.90 04/15/2024 08:42 AM    EOSINOPHILS 2.40 04/15/2024 08:42 AM    BASOPHILS 1.00 04/15/2024 08:42 AM      IMAGING REVIEWED AND INTERPRETED:    [place] [facility]:  Normal left ventricular systolic function.  The ejection fraction is measured to be 67 % by Irving's biplane.  Normal diastolic function.  The right ventricle is normal in size and systolic function.  Mild aortic valve stenosis.  Mild aortic insufficiency.    [place] [facility]  Coronary Anatomy              Left Main:  Normal              LAD:  80% proximal stenosis.  Minimal luminal irregularities at the midportion of the vessel.  The first diagonal is a large vessel with mid 90% stenosis              LCx: Dominant, proximal 50% stenosis.  The mid segment has a 90% stenosis just prior to the only significant obtuse marginal vessel which has proximal 30% stenosis of the lower limb . Left PDA is normal               RCA: Non-dominant, Minimal luminal irregularities                Left Ventriculography:   LVEF= 45%. Global hypokinesis    IMPRESSION:  Accelerating Angina, MVCAD,, Htn, HLD      PLAN:  I dicussed pros and cons of surgery with the patient and her daughter at the bedside. She prefers to try stents first, and given her age and her concern to b e able to recover after surgery., I believe that is a reasonable request.     The procedure, its risks, benefits, potential complications and alternative treatments were discussed with the patient in detail including the risks should she decide not to undergo my recommended treatment. All of her questions were answered to her satisfaction and she is willing to proceed with the operation. The risks include death, stroke, infection: to include a rare bacterial infection related to the use of the heart/lung machine, roger-operative myocardial infarction, dysrhythmias, diaphragmatic paralysis, chest wall paresthesia, tracheostomy, kidney or other organ failure, possible return to the operating room for bleeding, bleeding requiring transfusion with its attendant risks including AIDS or hepatitis, dehiscence of surgical incisions, respiratory complications including the need for prolonged ventilator support, Protamine or other drug reaction, peripheral neuropathy, loss of limb, and miscount of surgical items. The operative mortality risk is approximately 2%. The STS mortality risk score is 1.8% and the morbidity and mortality risk score is 14% urgent CABG. The scores were discussed with patient.    Thank you for this very challenging consultation and participation in the patient’s care.  I will keep you apprised of all future developments.      Sincerely,       Brian Taylor DO.        I,  Brian Taylor DO performed a substantial portion of the service face-to-face with the same patient on the same date of service. I have reviewed and agree with the care plan and  complexity of problems documented by me, Brian Taylor DO and/or JAYNE Meehan. I was personally involved in the medical decision making, including the information below:  reviewing and interpreting the films, conducted elements of the history and physical exam, and provided the plan of care. All medical decision making was made by me.     Please note that this dictation was created using voice recognition software. The accuracy of the dictation is limited to the abilities of the software. I have made every reasonable attempt to correct obvious errors, but I expect that there are errors of grammar and possibly content that I did not discover before finalizing the note.

## 2024-11-26 ENCOUNTER — PATIENT MESSAGE (OUTPATIENT)
Dept: CARDIOLOGY | Facility: MEDICAL CENTER | Age: 79
End: 2024-11-26
Payer: MEDICARE

## 2024-11-26 ENCOUNTER — TELEPHONE (OUTPATIENT)
Dept: CARDIOLOGY | Facility: MEDICAL CENTER | Age: 79
End: 2024-11-26
Payer: MEDICARE

## 2024-11-26 ENCOUNTER — PHARMACY VISIT (OUTPATIENT)
Dept: PHARMACY | Facility: MEDICAL CENTER | Age: 79
End: 2024-11-26
Payer: COMMERCIAL

## 2024-11-26 VITALS
OXYGEN SATURATION: 97 % | BODY MASS INDEX: 25.19 KG/M2 | WEIGHT: 128.31 LBS | RESPIRATION RATE: 16 BRPM | HEIGHT: 60 IN | DIASTOLIC BLOOD PRESSURE: 95 MMHG | HEART RATE: 89 BPM | SYSTOLIC BLOOD PRESSURE: 137 MMHG | TEMPERATURE: 98.1 F

## 2024-11-26 LAB
ALBUMIN SERPL BCP-MCNC: 3.2 G/DL (ref 3.2–4.9)
ALBUMIN/GLOB SERPL: 1.3 G/DL
ALP SERPL-CCNC: 68 U/L (ref 30–99)
ALT SERPL-CCNC: 9 U/L (ref 2–50)
ANION GAP SERPL CALC-SCNC: 10 MMOL/L (ref 7–16)
AST SERPL-CCNC: 19 U/L (ref 12–45)
BASOPHILS # BLD AUTO: 0.4 % (ref 0–1.8)
BASOPHILS # BLD: 0.04 K/UL (ref 0–0.12)
BILIRUB SERPL-MCNC: 0.6 MG/DL (ref 0.1–1.5)
BUN SERPL-MCNC: 10 MG/DL (ref 8–22)
CALCIUM ALBUM COR SERPL-MCNC: 8.9 MG/DL (ref 8.5–10.5)
CALCIUM SERPL-MCNC: 8.3 MG/DL (ref 8.5–10.5)
CHLORIDE SERPL-SCNC: 102 MMOL/L (ref 96–112)
CHOLEST SERPL-MCNC: 120 MG/DL (ref 100–199)
CO2 SERPL-SCNC: 22 MMOL/L (ref 20–33)
CREAT SERPL-MCNC: 0.59 MG/DL (ref 0.5–1.4)
EOSINOPHIL # BLD AUTO: 0.04 K/UL (ref 0–0.51)
EOSINOPHIL NFR BLD: 0.4 % (ref 0–6.9)
ERYTHROCYTE [DISTWIDTH] IN BLOOD BY AUTOMATED COUNT: 41.1 FL (ref 35.9–50)
EST. AVERAGE GLUCOSE BLD GHB EST-MCNC: 117 MG/DL
GFR SERPLBLD CREATININE-BSD FMLA CKD-EPI: 91 ML/MIN/1.73 M 2
GLOBULIN SER CALC-MCNC: 2.5 G/DL (ref 1.9–3.5)
GLUCOSE SERPL-MCNC: 140 MG/DL (ref 65–99)
HBA1C MFR BLD: 5.7 % (ref 4–5.6)
HCT VFR BLD AUTO: 33.3 % (ref 37–47)
HDLC SERPL-MCNC: 41 MG/DL
HGB BLD-MCNC: 11.4 G/DL (ref 12–16)
IMM GRANULOCYTES # BLD AUTO: 0.03 K/UL (ref 0–0.11)
IMM GRANULOCYTES NFR BLD AUTO: 0.3 % (ref 0–0.9)
LDLC SERPL CALC-MCNC: 63 MG/DL
LYMPHOCYTES # BLD AUTO: 1.4 K/UL (ref 1–4.8)
LYMPHOCYTES NFR BLD: 15.3 % (ref 22–41)
MAGNESIUM SERPL-MCNC: 1.6 MG/DL (ref 1.5–2.5)
MCH RBC QN AUTO: 30.5 PG (ref 27–33)
MCHC RBC AUTO-ENTMCNC: 34.2 G/DL (ref 32.2–35.5)
MCV RBC AUTO: 89 FL (ref 81.4–97.8)
MONOCYTES # BLD AUTO: 0.62 K/UL (ref 0–0.85)
MONOCYTES NFR BLD AUTO: 6.8 % (ref 0–13.4)
NEUTROPHILS # BLD AUTO: 7.04 K/UL (ref 1.82–7.42)
NEUTROPHILS NFR BLD: 76.8 % (ref 44–72)
NRBC # BLD AUTO: 0 K/UL
NRBC BLD-RTO: 0 /100 WBC (ref 0–0.2)
PLATELET # BLD AUTO: 227 K/UL (ref 164–446)
PMV BLD AUTO: 9.5 FL (ref 9–12.9)
POTASSIUM SERPL-SCNC: 3.5 MMOL/L (ref 3.6–5.5)
PROT SERPL-MCNC: 5.7 G/DL (ref 6–8.2)
RBC # BLD AUTO: 3.74 M/UL (ref 4.2–5.4)
SODIUM SERPL-SCNC: 134 MMOL/L (ref 135–145)
TRIGL SERPL-MCNC: 79 MG/DL (ref 0–149)
WBC # BLD AUTO: 9.2 K/UL (ref 4.8–10.8)

## 2024-11-26 PROCEDURE — 99233 SBSQ HOSP IP/OBS HIGH 50: CPT | Performed by: INTERNAL MEDICINE

## 2024-11-26 PROCEDURE — 97535 SELF CARE MNGMENT TRAINING: CPT

## 2024-11-26 PROCEDURE — RXMED WILLOW AMBULATORY MEDICATION CHARGE: Performed by: HOSPITALIST

## 2024-11-26 PROCEDURE — 80061 LIPID PANEL: CPT

## 2024-11-26 PROCEDURE — G0378 HOSPITAL OBSERVATION PER HR: HCPCS

## 2024-11-26 PROCEDURE — 36415 COLL VENOUS BLD VENIPUNCTURE: CPT

## 2024-11-26 PROCEDURE — 99239 HOSP IP/OBS DSCHRG MGMT >30: CPT | Performed by: HOSPITALIST

## 2024-11-26 PROCEDURE — A9270 NON-COVERED ITEM OR SERVICE: HCPCS

## 2024-11-26 PROCEDURE — A9270 NON-COVERED ITEM OR SERVICE: HCPCS | Performed by: HOSPITALIST

## 2024-11-26 PROCEDURE — 85025 COMPLETE CBC W/AUTO DIFF WBC: CPT

## 2024-11-26 PROCEDURE — 700102 HCHG RX REV CODE 250 W/ 637 OVERRIDE(OP): Performed by: INTERNAL MEDICINE

## 2024-11-26 PROCEDURE — 700102 HCHG RX REV CODE 250 W/ 637 OVERRIDE(OP)

## 2024-11-26 PROCEDURE — 83036 HEMOGLOBIN GLYCOSYLATED A1C: CPT

## 2024-11-26 PROCEDURE — 80053 COMPREHEN METABOLIC PANEL: CPT

## 2024-11-26 PROCEDURE — 97162 PT EVAL MOD COMPLEX 30 MIN: CPT

## 2024-11-26 PROCEDURE — 700102 HCHG RX REV CODE 250 W/ 637 OVERRIDE(OP): Performed by: HOSPITALIST

## 2024-11-26 PROCEDURE — 83735 ASSAY OF MAGNESIUM: CPT

## 2024-11-26 PROCEDURE — A9270 NON-COVERED ITEM OR SERVICE: HCPCS | Performed by: INTERNAL MEDICINE

## 2024-11-26 RX ORDER — ATORVASTATIN CALCIUM 80 MG/1
80 TABLET, FILM COATED ORAL EVERY EVENING
Qty: 30 TABLET | Refills: 0 | Status: SHIPPED | OUTPATIENT
Start: 2024-11-26 | End: 2024-12-30 | Stop reason: SDUPTHER

## 2024-11-26 RX ORDER — METOPROLOL TARTRATE 25 MG/1
25 TABLET, FILM COATED ORAL 2 TIMES DAILY
Status: DISCONTINUED | OUTPATIENT
Start: 2024-11-26 | End: 2024-11-26

## 2024-11-26 RX ORDER — DILTIAZEM HYDROCHLORIDE 120 MG/1
120 CAPSULE, COATED, EXTENDED RELEASE ORAL
Status: DISCONTINUED | OUTPATIENT
Start: 2024-11-26 | End: 2024-11-26 | Stop reason: HOSPADM

## 2024-11-26 RX ORDER — CLOPIDOGREL BISULFATE 75 MG/1
75 TABLET ORAL DAILY
Qty: 30 TABLET | Refills: 1 | Status: SHIPPED | OUTPATIENT
Start: 2024-11-27 | End: 2024-12-30 | Stop reason: SDUPTHER

## 2024-11-26 RX ORDER — DILTIAZEM HYDROCHLORIDE 120 MG/1
120 CAPSULE, COATED, EXTENDED RELEASE ORAL DAILY
Qty: 30 CAPSULE | Refills: 0 | Status: SHIPPED | OUTPATIENT
Start: 2024-11-27 | End: 2024-12-30 | Stop reason: SDUPTHER

## 2024-11-26 RX ORDER — POTASSIUM CHLORIDE 1500 MG/1
20 TABLET, EXTENDED RELEASE ORAL ONCE
Status: COMPLETED | OUTPATIENT
Start: 2024-11-26 | End: 2024-11-26

## 2024-11-26 RX ADMIN — DILTIAZEM HYDROCHLORIDE 120 MG: 120 CAPSULE, EXTENDED RELEASE ORAL at 10:02

## 2024-11-26 RX ADMIN — OMEPRAZOLE 20 MG: 20 CAPSULE, DELAYED RELEASE ORAL at 05:23

## 2024-11-26 RX ADMIN — POTASSIUM CHLORIDE 20 MEQ: 1500 TABLET, EXTENDED RELEASE ORAL at 10:02

## 2024-11-26 RX ADMIN — ASPIRIN 81 MG: 81 TABLET, COATED ORAL at 05:23

## 2024-11-26 RX ADMIN — CLOPIDOGREL BISULFATE 75 MG: 75 TABLET ORAL at 05:22

## 2024-11-26 ASSESSMENT — COGNITIVE AND FUNCTIONAL STATUS - GENERAL
SUGGESTED CMS G CODE MODIFIER MOBILITY: CH
MOBILITY SCORE: 24

## 2024-11-26 ASSESSMENT — ENCOUNTER SYMPTOMS
SHORTNESS OF BREATH: 0
PALPITATIONS: 0
GASTROINTESTINAL NEGATIVE: 1
MUSCULOSKELETAL NEGATIVE: 1
CARDIOVASCULAR NEGATIVE: 1
EYES NEGATIVE: 1
NERVOUS/ANXIOUS: 0
CHILLS: 0
NEUROLOGICAL NEGATIVE: 1
COUGH: 0
VOMITING: 0
FEVER: 0
BRUISES/BLEEDS EASILY: 0
NAUSEA: 0
DIZZINESS: 0
CONSTITUTIONAL NEGATIVE: 1
PSYCHIATRIC NEGATIVE: 1
MYALGIAS: 0
ABDOMINAL PAIN: 0
WEAKNESS: 0
RESPIRATORY NEGATIVE: 1
HEADACHES: 0

## 2024-11-26 ASSESSMENT — GAIT ASSESSMENTS
DISTANCE (FEET): 500
GAIT LEVEL OF ASSIST: SUPERVISED

## 2024-11-26 ASSESSMENT — FIBROSIS 4 INDEX: FIB4 SCORE: 2.2

## 2024-11-26 NOTE — PROGRESS NOTES
Cardiology Follow Up Progress Note    Date of Service  11/26/2024    Attending Physician  FLORENCIO Martins.*    Chief Complaint   Coronary artery disease status post stent placements.     HPI  Vaishali Solis is a 79 y.o. female admitted 11/25/2024 with above.    Interim Events  She underwent cardiac catheterization as below.     Review of Systems  Review of Systems   Constitutional: Negative.  Negative for chills and fever.   HENT: Negative.  Negative for hearing loss.    Eyes: Negative.    Respiratory: Negative.  Negative for cough and shortness of breath.    Cardiovascular: Negative.  Negative for chest pain, palpitations and leg swelling.   Gastrointestinal: Negative.  Negative for abdominal pain, nausea and vomiting.   Genitourinary: Negative.  Negative for dysuria and urgency.   Musculoskeletal: Negative.  Negative for myalgias.   Skin: Negative.  Negative for rash.   Neurological: Negative.  Negative for dizziness, weakness and headaches.   Hematological:  Does not bruise/bleed easily.   Psychiatric/Behavioral: Negative.  The patient is not nervous/anxious.        Vital signs in last 24 hours  Temp:  [36.1 °C (97 °F)-36.7 °C (98.1 °F)] 36.7 °C (98.1 °F)  Pulse:  [66-89] 89  Resp:  [16-18] 16  BP: (131-165)/() 137/95  SpO2:  [90 %-100 %] 97 %    Physical Exam  Physical Exam  Constitutional:       Appearance: Normal appearance. She is well-developed and normal weight.   HENT:      Head: Normocephalic and atraumatic.      Mouth/Throat:      Mouth: Mucous membranes are moist.   Eyes:      Extraocular Movements: Extraocular movements intact.      Conjunctiva/sclera: Conjunctivae normal.   Cardiovascular:      Rate and Rhythm: Normal rate and regular rhythm.      Pulses: Normal pulses.      Heart sounds: Normal heart sounds.   Pulmonary:      Effort: Pulmonary effort is normal.      Breath sounds: Normal breath sounds.   Abdominal:      General: Bowel sounds are normal.      Palpations: Abdomen is soft.  "  Musculoskeletal:         General: Normal range of motion.      Cervical back: Normal range of motion and neck supple.   Skin:     General: Skin is warm and dry.   Neurological:      General: No focal deficit present.      Mental Status: She is alert and oriented to person, place, and time. Mental status is at baseline.   Psychiatric:         Mood and Affect: Mood normal.         Behavior: Behavior normal.         Thought Content: Thought content normal.         Judgment: Judgment normal.         Lab Review  Lab Results   Component Value Date/Time    WBC 9.2 11/26/2024 12:57 AM    RBC 3.74 (L) 11/26/2024 12:57 AM    HEMOGLOBIN 11.4 (L) 11/26/2024 12:57 AM    HEMATOCRIT 33.3 (L) 11/26/2024 12:57 AM    MCV 89.0 11/26/2024 12:57 AM    MCH 30.5 11/26/2024 12:57 AM    MCHC 34.2 11/26/2024 12:57 AM    MPV 9.5 11/26/2024 12:57 AM      Lab Results   Component Value Date/Time    SODIUM 134 (L) 11/26/2024 12:57 AM    POTASSIUM 3.5 (L) 11/26/2024 12:57 AM    CHLORIDE 102 11/26/2024 12:57 AM    CO2 22 11/26/2024 12:57 AM    GLUCOSE 140 (H) 11/26/2024 12:57 AM    BUN 10 11/26/2024 12:57 AM    CREATININE 0.59 11/26/2024 12:57 AM      Lab Results   Component Value Date/Time    ASTSGOT 19 11/26/2024 12:57 AM    ALTSGPT 9 11/26/2024 12:57 AM     Lab Results   Component Value Date/Time    CHOLSTRLTOT 120 11/26/2024 12:57 AM    LDL 63 11/26/2024 12:57 AM    HDL 41 11/26/2024 12:57 AM    TRIGLYCERIDE 79 11/26/2024 12:57 AM       No results for input(s): \"NTPROBNP\" in the last 72 hours.  (Above labs reviewed.)       Current Facility-Administered Medications:     potassium chloride SA (Kdur) tablet 20 mEq, 20 mEq, Oral, Once, Samuel Krishna M.D.    omeprazole (PriLOSEC) capsule 20 mg, 20 mg, Oral, DAILY, IGOR Madrigal.P.R.N., 20 mg at 11/26/24 0523    acetaminophen (Tylenol) tablet 650 mg, 650 mg, Oral, Q6HRS PRN, IGOR Madrigal.P.R.N.    senna-docusate (Pericolace Or Senokot S) 8.6-50 MG per tablet 2 Tablet, 2 " Tablet, Oral, Q EVENING **AND** polyethylene glycol/lytes (Miralax) Packet 1 Packet, 1 Packet, Oral, QDAY PRN, Pilar Villanueva, A.P.R.N.    atorvastatin (Lipitor) tablet 80 mg, 80 mg, Oral, Q EVENING, Pilar Villanueva, A.P.R.N., 80 mg at 11/25/24 1739    aspirin EC tablet 81 mg, 81 mg, Oral, DAILY, Ceasar Schwartz M.D., 81 mg at 11/26/24 0523    clopidogrel (Plavix) tablet 75 mg, 75 mg, Oral, DAILY, Ceasar Schwartz M.D., 75 mg at 11/26/24 0522  (Medications reviewed.)    Cardiac Imaging and Procedures Review  CARDIAC STUDIES/PROCEDURES:    CARDIAC CATHETERIZATION CONCLUSIONS by Ceasar Vidales (11/25/24)  1. Successful staged PCI of the Cx, LAD, diagonal using AMISH, IVUS  2. Normal LVEDP  (study result reviewed)     CARDIAC CATHETERIZATION CONCLUSIONS by Ceasar Vidales (11/25/24)  1.  Accelerating angina with obstructive multivessel disease  2.  Mild aortic stenosis  3.  Moderate elevation in LVEDP at 24 mmHg  (study result reviewed)     ECHOCARDIOGRAM CONCLUSIONS (10?03/24)  Compared to the images of the prior study on 12/15/2021 - there is now   mild aortic stenosis.  Normal left ventricular systolic function.  The ejection fraction is measured to be 67 % by Irving's biplane.  Normal diastolic function.  The right ventricle is normal in size and systolic function.  Mild aortic valve stenosis.  Mild aortic insufficiency.  (study result reviewed)     EKG performed on (11/25/24) was reviewed: EKG personally interpreted shows sinus rhythm.     Assessment/Plan  Coronary artery disease with stent placement: She is clinically doing well from cardiac standpoint. She will be discharged to home today.   Paroxysmal supraventricular tachycardia: We will start diltiazem as she could not tolerate beta blockade therapy. She may not take flecainide at this point.       Thank you for allowing me to participate in the care of this patient.  Cardiology will sign off on this patient    Please contact me with any  questions.    Sundar Wilson M.D.   Cardiologist, [facility]  [phone]

## 2024-11-26 NOTE — PROGRESS NOTES
Received pt from PACU post cardiac cath with stent placement. Right hemoband with 13 cc's in place. Pt arrived to floor with Angiomax running with a stop time of 2010 per report. Post op vitals continued. SR on telemetry monitor

## 2024-11-26 NOTE — DISCHARGE PLANNING
HTH/ [facility] TCN chart review completed. Due to low LACE 34 and high 6 click score of 24 ADL's and 24 mobility as well as patients currently documented level of function including Kardex of 15 feet X 2 no AD, no TCN needs anticipated in patient discharge planning at this time. Should post acute discharge needs arise warranting TCN involvement, please contact TCN team via Voalte. Thank you.

## 2024-11-26 NOTE — PROGRESS NOTES
Monitor Summary:    SR 74-84    Rare PAC; Rare PVC; Occ. Trigem    PSVT up to 174 for up to 6.8 seconds (TRISHA Winters notified)    .18/.08/.36

## 2024-11-26 NOTE — PROGRESS NOTES
Pt. being discharged. Pt. educated on discharge instructions and new prescriptions. Pt verbalizes understanding. Follow up appointment made with cardiology. PIV removed, monitor checked in. Pt. Going home with family.

## 2024-11-26 NOTE — PROCEDURES
"CARDIAC CATHETERIZATION REPORT    Referring Provider: Brian Taylor DO    PROCEDURE PHYSICIAN: Ceasar Schwartz MD, [facility], [facility]  ASSISTANT: None    IMPRESSIONS:  1. Successful staged PCI of the Cx, LAD, diagonal using AMISH, IVUS  2. Normal LVEDP    Recommendations:  Usual post cath care    Pre-procedure diagnosis: Unstable angina - surgical turndown  Post-procedure diagnosis: Same    Procedure performed  Left heart catheterization  Percutaneous coronary intervention - Stent Placement (LAD, diagonal, Cx)  Intravascular Ultrasound (LAD, Diagonal, Cx)    Conscious sedation was supervised by myself and administered by trained personnel using fentanyl and versed between 304 and 409. The patient tolerated sedation without complication.     Procedure Description  1. Access: 5/6 [place] right radial artery Micropuncture technique was utilized following local anesthesia with lidocaine.  A radial cocktail containing verapamil and saline was administered in the radial artery sheath    2. Description of Intervention: After confirming therapeutic anticoagulation intervention proceeded. A 6F EBU 3.5 guiding catheter was used. The lesion in the circumflex was crossed with a 0.014\" Run Through wire.  I performed IVUS.  This showed a 3.5 reference vessel size, shallow calcification throughout the stenotic area.  I predilated with a 3.5 NC balloon and then deployed a 3.5 x 26 mm [place] AMISH.  There was waist proximally which was postdilated with a 3.5 NC balloon at 20 judith with full resolution of the waist.  I then redirected the run-through wire into the LAD and passed a [place] wire into the diagonal.  I attempted to IVUS the diagonal although the IVUS would not cross the lesion.  Proximally the vessel was 3.0 mm.  I used a 3.0 x 20 mm NC balloon to predilate the lesion and then deployed a 3.0 x 22 mm [place] frontier AMISH at nominal atmospheres.  This was postdilated with a 3.0 NC balloon.  Angiogram showed excellent result.  " There was noted to be disease of 50% stenosis extending to the ostium of the vessel however the angle of takeoff was favorable and I plan to approach the bifurcation and the provisional way.  I passed the IVUS into the LAD.  There was a 3.0 distal reference and 3.5 proximal.  I predilated with a 3.0 NC balloon and then deployed a 3.0 x 22 mm [place] AMISH at 14 judith.  The mid stent body was dilated with a 3.0 NC balloon at 20 judith and proximally a 3.5 NC balloon.  After stent deployment there was shift of plaque to the ostium of the diagonal and now 90% stenosis.  I attempted to wire with a [place], run-through and  50 wire.  These all selected readily into a subintimal channel and unfortunately would not traverse the stent struts into the true lumen.  Flow remained CARMELLA-3 into the vessel but there did appear to be probable intramural hematoma and so after many attempts of trying to achieve lumen the lumen path of the wire I elected to abandon further efforts.  Final angiograms showed preserved flow in all branches    4. Closing: At completion of the procedure the relevant equipment was removed from the body and hemostasis achieved by Radial band    Findings   Hemodynamics:   Aorta: 128/70 mmHg  LVEDP: 15 mmHg    Results of intervention to the circumflex:  Pre: 90% stenosis and CARMELLA III flow  Post: 0% residual stenosis and CARMELLA III flow.    Results of intervention to the diagonal 1 :  Pre: 90% stenosis and CARMELLA III flow  Post: 0% residual stenosis and CARMELLA III flow.     Results of intervention to the LAD:  Pre: 80% stenosis and CARMELLA III flow  Post: 0% residual stenosis and CARMELLA III flow.     Technical Factors  1. Complications: None  2. Estimated Blood Loss: <50 cc  3. Specimens: None  4. Contrast Volume: 100 ml  5. Medications: Radial cocktail (Verapamil 2.5 mg, Nitroglycerin 100 mcg) Heparin 6000 units Bivalirudin Clopidogrel 600 mg

## 2024-11-26 NOTE — CARE PLAN
The patient is Stable - Low risk of patient condition declining or worsening    Shift Goals  Clinical Goals: monitor for bleeding  Patient Goals: rest, discharge  Family Goals: updates, discharge planning    Progress made toward(s) clinical / shift goals:    Problem: Knowledge Deficit - Standard  Goal: Patient and family/care givers will demonstrate understanding of plan of care, disease process/condition, diagnostic tests and medications  Outcome: Progressing  Note: White board updated with POC and care team information during bedside report.

## 2024-11-26 NOTE — PROGRESS NOTES
Bedside report received from off going RN/tech: Gila assumed care of patient.   Overnight Events: overnight pt had a couple runs of PSVT up into the 170's   A&O x 4  Oxygen: Room Air  SBP Ranged: 130's-150's  Patient on cardiac monitor: Yes SR 70's-80's  IVF/IV medications: Not Applicable   Fall Risk Score: NO RISK  Fall risk interventions in place: Place yellow fall risk ID band on patient, Provide patient/family education based on risk assessment, Educate patient/family to call staff for assistance when getting out of bed, Place fall precaution signage outside patient door, Utilize bed/chair fall alarm, Notify charge of high risk for huddle, and Bed alarm connected correctly  Bed type: Regular (Tal Score less than 17 interventions in place)  Bedside sitter: Not Applicable   Isolation: Not applicable

## 2024-11-26 NOTE — THERAPY
"Physical Therapy   Initial Evaluation     Patient Name: Shaniqua Solis  Age:  79 y.o., Sex:  female  Medical Record #: [id number]  Today's Date: 11/26/2024     Precautions  Precautions: Cardiac Precautions (See Comments)    Assessment  Patient is 79 y.o. female that presented to acute with complaints of SOB and chest tightness, found to have multivessel disease and declined CTS, s/p complex PCI/AMISH to LAD, diagonal, and circumflex. EF 45% per cath. PMHx significant for HLD.    They presented to PT with decreased activity tolerance. They mobilized as detailed below; patient reported no signs/symptoms of inappropriate hemodynamic response during activity but at end of session she reported feeling a \"heaviness\" in her chest.    Additional time required for patient education regarding: physiology of cardiovascular system and hemodynamic response; home exercise program and appropriate activity progression; self monitoring via RPE scale/talk test/HR; and benefits of outpatient Healthy Heart Program. Patient was receptive to education and demonstrated understanding but would likely benefit from reinforcement. Recommend outpatient cardiac rehab.      Plan    Physical Therapy Initial Treatment Plan   Duration: Evaluation only    DC Equipment Recommendations: None  Discharge Recommendations:  (outpatient cardiac rehab)       Subjective    RN cleared patient for therapy; patient received in bed and agreeable     Objective       11/26/24 0914   Time In/Time Out   Therapy Start Time 0846   Therapy End Time 0914   Total Therapy Time 28   Charge Group   PT Evaluation PT Evaluation Mod   PT Self Care / Home Evaluation (Units) 1   Total Time Spent   PT Total Time Yes   PT Evaluation Time Spent (Mins) 20   PT Self Care/Home Evaluation Time Spent (Mins) 8   PT Total Time Spent (Calculated) 28   Initial Contact Note    Initial Contact Note Order Received and Verified, Evaluation Only - Patient Does Not Require Further Acute Physical " "Therapy at this Time.  However, May Benefit from Post Acute Therapy for Higher Level Functional Deficits.   Precautions   Precautions Cardiac Precautions (See Comments)   Vitals   O2 (LPM) 0   O2 Delivery Device None - Room Air   Vitals Comments BP, HR respectively: 130/83, 84 semi reclined; 122/81, 90 sitting EOB; 136/79, 92 standing EOB; 131/91, 116 standing post activity   Pain 0 - 10 Group   Therapist Pain Assessment   (no pain complaint during session)   Prior Living Situation   Prior Services None   Housing / Facility 1 Story House   Steps Into Home 1   Steps In Home 0   Equipment Owned None   Lives with - Patient's Self Care Capacity Adult Children   Comments Patient reported she lives with her adult son who is capable of helping but also works and has a gambing addiction   Prior Level of Functional Mobility   Bed Mobility Independent   Transfer Status Independent   Ambulation Independent   Ambulation Distance community   Assistive Devices Used None   Stairs Independent   Comments Patient is typically active and independent   Cognition    Cognition / Consciousness WDL   Comments pleasant, cooperative, demonstrated understanding of education   Balance Assessment   Sitting Balance (Static) Good   Sitting Balance (Dynamic) Good   Standing Balance (Static) Fair +   Standing Balance (Dynamic) Fair +   Weight Shift Sitting Good   Weight Shift Standing Good   Comments no AD, no LOB   Gait Analysis   Gait Level Of Assist Supervised   Assistive Device None   Distance (Feet) 500   # of Times Distance was Traveled 1   Deviation   (decreased hattie)   # of Stairs Climbed 0   Weight Bearing Status no restrictions   Vision Deficits Impacting Mobility denied   Comments negative talk test, patient reported \"heaviness\" and concern regarding palpitation   6 Clicks Assessment - How much HELP from from another person do you currently need... (If the patient hasn't done an activity recently, how much help from another person do " you think he/she would need if he/she tried?)   Turning from your back to your side while in a flat bed without using bedrails? 4   Moving from lying on your back to sitting on the side of a flat bed without using bedrails? 4   Moving to and from a bed to a chair (including a wheelchair)? 4   Standing up from a chair using your arms (e.g., wheelchair, or bedside chair)? 4   Walking in hospital room? 4   Climbing 3-5 steps with a railing? 4   6 clicks Mobility Score 24   Education Group   Education Provided Role of Physical Therapist;Cardiac Precautions   Cardiac Precautions Patient Response Patient;Acceptance;Explanation;Verbal Demonstration;Demonstration;Handout;Action Demonstration   Role of Physical Therapist Patient Response Patient;Acceptance;Explanation;Verbal Demonstration   Physical Therapy Initial Treatment Plan    Duration Evaluation only   Problem List    Problems Impaired Ambulation;Decreased Activity Tolerance;Limited Knowledge of Post-Op Precautions   Anticipated Discharge Equipment and Recommendations   DC Equipment Recommendations None   Discharge Recommendations   (outpatient cardiac rehab)   Interdisciplinary Plan of Care Collaboration   IDT Collaboration with  Nursing   Patient Position at End of Therapy Seated;Call Light within Reach;Tray Table within Reach;Phone within Reach   Collaboration Comments RN aware of visit, gurinder   Session Information   Date / Session Number  11/26 - 1x only

## 2024-11-26 NOTE — TELEPHONE ENCOUNTER
Let her know I reviewed her case and I'm glad she decided for stents, we got an excellent result, reinforce the importance of DAPT and no flecainide with close follow up.

## 2024-11-26 NOTE — DISCHARGE INSTRUCTIONS
Diagnosis:  Acute Coronary Syndrome (ACS) is a diagnosis that encompasses cardiac-related chest pain and heart attack. ACS occurs when the blood flow to the heart muscle is severely reduced or cut off completely due to a slow process called atherosclerosis.  Atherosclerosis is a disease in which the coronary arteries become narrow from a buildup of fat, cholesterol, and other substances that combine to form plaque. If the plaque breaks, a blood clot will form and block the blood flow to the heart muscle. This lack of blood flow can cause damage or death to the heart muscle which is called a heart attack or Myocardial Infarction (MI). There are two different types of MIs:  ST Elevation Myocardial Infarction or STEMI (the most severe type of heart attack) and Non-ST Elevation Myocardial Infarction or NSTEMI.    Treatment Plan:  Cardiac Diet  - Low fat, low salt, low cholesterol   Cardiac Rehab  - Your doctor has ordered you a referral to [facility].  Call [phone] to schedule an appointment.  Attend my follow-up appointment with my Cardiologist.  Take my medications as prescribed by my doctor  Exercise daily  Lower my bad cholesterol and raise my good cholesterol, lower my blood pressure, and Reduce stress    Medications:  Certain medications are used to treat ACS.  Remember to always take medications as prescribed and never stop talking medications unless told by your doctor.    You have been prescribed the following medicatons:    Aspirin - Aspirin is used as a blood thinning medication and you will require this medication indefinitely.  Anti-platelet/blood thinner - Your Anti-platelet/Blood thinning medication is called CLOPIDOGREL (PLAVIX), and is used in combination with aspirin to prevent clots from forming in your heart and/or around your stent.  Your doctor will determine how long you need to be on this medicine.  Statin - Statin ATORVASTATIN (LIPITOR) is used to lower cholesterol.    Coronary Angiogram With  Stent, Care After  This sheet gives you information about how to care for yourself after your procedure. Your health care provider may also give you more specific instructions. If you have problems or questions, contact your health care provider.  What can I expect after the procedure?  After the procedure, it is common to have:  Bruising and tenderness at the insertion site. This usually fades within 1-2 weeks.  A collection of blood under the skin (hematoma). This usually decreases within 1-2 weeks.  Follow these instructions at home:  Medicines  Take over-the-counter and prescription medicines only as told by your health care provider.  If you were prescribed an antibiotic medicine, take it as told by your health care provider. Do not stop using the antibiotic even if you start to feel better.  If you take medicines for diabetes, your health care provider may need to change how much you take. Ask your health care provider for specific directions about taking your diabetes medicines.  If you are taking blood thinners:  Talk with your health care provider before you take any medicines that contain aspirin or NSAIDs, such as ibuprofen. These medicines increase your risk for dangerous bleeding.  Take your medicine exactly as told, at the same time every day.  Avoid activities that could cause injury or bruising, and follow instructions about how to prevent falls.  Wear a medical alert bracelet or carry a card that lists what medicines you take.  Eating and drinking    Follow instructions from your health care provider about eating or drinking restrictions.  Eat a heart-healthy diet that includes plenty of fresh fruits and vegetables.  Avoid foods that are high in salt, sugar, or saturated fat. Avoid fried foods or canned or highly processed food.  Drink enough fluid to keep your urine pale yellow.  Alcohol use  Do not drink alcohol if:  Your health care provider tells you not to.  You are pregnant, may be pregnant,  or plan to become pregnant.  If you drink alcohol:  Limit how much you use to:  0-1 drink a day for women.  0-2 drinks a day for men.  Be aware of how much alcohol is in your drink. In the [place] one drink equals one 12 oz bottle of beer (355 mL), one 5 oz glass of wine (148 mL), or one 1½ oz glass of hard liquor (44 mL).  Bathing  Do not take baths, swim, or use a hot tub until your health care provider approves. Ask your health care provider if you may take showers. You may only be allowed to take sponge baths.  Gently wash the insertion site with plain soap and water.  Pat the area dry with a clean towel. Do not rub. This may cause bleeding.  Incision care  Follow instructions from your health care provider about how to take care of your insertion area. Make sure you:  Wash your hands with soap and water before and after you change your bandage (dressing). If soap and water are not available, use hand .  Change your dressing as told by your health care provider.  Leave stitches (sutures) or adhesive strips in place. These skin closures may need to stay in place for 2 weeks or longer. If adhesive strip edges start to loosen and curl up, you may trim the loose edges. Do not remove adhesive strips completely unless your health care provider tells you to do that.  Do not apply powder or lotion on the insertion area.  Check your insertion area every day for signs of infection. Check for:  Redness, swelling, or pain.  Fluid or blood.  Warmth.  Pus or a bad smell.  Activity  Do not drive for 24 hours if you were given a sedative during your procedure.  Rest as told by your health care provider.  Avoid sitting for a long time without moving. Get up to take short walks every 1-2 hours. This is important to improve blood flow and breathing. Ask for help if you feel weak or unsteady.  Do not lift anything that is heavier than 10 lb (4.5 kg), or the limit that you are told, until your health care provider says that  it is safe.  Return to your normal activities as told by your health care provider. Ask your health care provider what activities are safe for you.  Lifestyle    Do not use any products that contain nicotine or tobacco, such as cigarettes, e-cigarettes, and chewing tobacco. If you need help quitting, ask your health care provider.  If needed, work with your health care provider to treat other problems, such as being overweight, or having high blood pressure or diabetes.  Get regular exercise. Do exercises as told by your health care provider.  General instructions  Tell all your health care providers that you have a stent. This is especially important if you are going to get imaging studies, such as MRI.  Wear compression stockings as told by your health care provider. These stockings help to prevent blood clots and reduce swelling in your legs.  Do not strain during a bowel movement if the procedure was done through your leg. Straining may cause bleeding from the insertion site.  Keep all follow-up visits as directed by your health care provider. This is important.  Contact a health care provider if you:  Have a fever.  Have chills.  Have redness, swelling, or pain around your insertion area.  Have fluid or blood (other than a little blood on the dressing) coming from your insertion area.  Notice that your insertion area feels warm to the touch.  Have pus or a bad smell coming from your insertion area.  Have more bleeding from the insertion area. Hold pressure on the area.  Get help right away if:  You develop chest pain or shortness of breath.  You feel like fainting or you faint.  Your leg or arm becomes cool, numb, or tingly.  You have unusual pain.  Your insertion area is bleeding, and bleeding continues after 30 minutes of steadily held pressure.  You develop bleeding anywhere else, including from your rectum. There may be bright red blood in your urine or stool, or you may have black, tarry stool.  These  symptoms may represent a serious problem that is an emergency. Do not wait to see if the symptoms will go away. Get medical help right away. Call your local emergency services (911 in the [place] Do not drive yourself to the hospital.  Summary  After this procedure, it is common to have bruising and tenderness around the catheter insertion site. This will go away in a few weeks.  Follow your health care provider's instructions about caring for your insertion site. Change dressing and clean the area as instructed.  Eat a heart-healthy diet. Limit alcohol use. Do not use tobacco or nicotine.  Contact a health care provider if you have fever or chills, or if you have pus or a bad smell coming from the site.  Get help right away if you develop chest pain, you faint, or have bleeding at the insertion site.  This information is not intended to replace advice given to you by your health care provider. Make sure you discuss any questions you have with your health care provider.  Document Revised: 07/08/2020 Document Reviewed: 07/08/2020  [employer] Patient Education © 2023 [employer]vier Inc.    Clopidogrel Tablets  What is this medication?  CLOPIDOGREL (kloh PID oh grel) lowers the risk of heart attack, stroke, or blood clots. It prevents blood cells (platelets) from clumping together to form a clot. It belongs to a group of medications called antiplatelets.  This medicine may be used for other purposes; ask your health care provider or pharmacist if you have questions.  COMMON BRAND NAME(S): Plavix  What should I tell my care team before I take this medication?  They need to know if you have any of the following conditions:  Bleeding disorders  Bleeding in the brain  Having surgery  History of stomach bleeding  An unusual or allergic reaction to clopidogrel, other medications, foods, dyes, or preservatives  Pregnant or trying to get pregnant  Breast-feeding  How should I use this medication?  Take this medication by mouth with a  glass of water. Follow the directions on the prescription label. You may take this medication with or without food. If it upsets your stomach, take it with food. Take your medication at regular intervals. Do not take it more often than directed. Do not stop taking except on your care team's advice.  A special MedGuide will be given to you by the pharmacist with each prescription and refill. Be sure to read this information carefully each time.  Talk to your care team about the use of this medication in children. Special care may be needed.  Overdosage: If you think you have taken too much of this medicine contact a poison control center or emergency room at once.  NOTE: This medicine is only for you. Do not share this medicine with others.  What if I miss a dose?  If you miss a dose, take it as soon as you can. If it is almost time for your next dose, take only that dose. Do not take double or extra doses.  What may interact with this medication?  Do not take this medication with the following:  Dasabuvir; ombitasvir; paritaprevir; ritonavir  Defibrotide  Selexipag  This medication may also interact with the following:  Certain medications that treat or prevent blood clots like warfarin  Narcotic medications for pain  NSAIDs, medications for pain and inflammation, like ibuprofen or naproxen  Repaglinide  SNRIs, medications for depression, like desvenlafaxine, duloxetine, levomilnacipran, venlafaxine  SSRIs, medications for depression, like citalopram, escitalopram, fluoxetine, fluvoxamine, paroxetine, sertraline  Stomach acid blockers like cimetidine, esomeprazole, omeprazole  This list may not describe all possible interactions. Give your health care provider a list of all the medicines, herbs, non-prescription drugs, or dietary supplements you use. Also tell them if you smoke, drink alcohol, or use illegal drugs. Some items may interact with your medicine.  What should I watch for while using this  medication?  Visit your care team for regular check-ups. Do not stop taking your medication unless your care team tells you to.  Notify your care team and seek emergency services if you develop sudden numbness or weakness of the face, arm, or leg, trouble speaking, confusion, trouble walking, loss of balance or coordination, dizziness, severe headache, or change in vision. These can be signs that your condition has gotten worse.  If you are going to have surgery or dental work, tell your care team that you are taking this medication.  Certain genetic factors may reduce the effect of this medication. Your care team may use genetic tests to determine treatment.  Only take aspirin if you are instructed to. Low doses of aspirin are used with this medication to treat some conditions. Taking aspirin with this medication can increase your risk of bleeding, so you must be careful. Talk to your care team if you have questions.  What side effects may I notice from receiving this medication?  Side effects that you should report to your care team as soon as possible:  Allergic reactions--skin rash, itching, hives, swelling of the face, lips, tongue, or throat  Bleeding--bloody or black, tar-like stools, red or dark brown urine, vomiting blood or brown material that looks like coffee grounds, small, red or purple spots on the skin, unusual bleeding or bruising  TTP--purple spots on the skin or inside the mouth, pale skin, yellowing skin or eyes, unusual weakness or fatigue, fever, fast or irregular heartbeat, confusion, change in vision, trouble speaking, trouble walking  Side effects that usually do not require medical attention (report to your care team if they continue or are bothersome):  Diarrhea  Headache  This list may not describe all possible side effects. Call your doctor for medical advice about side effects. You may report side effects to FDA at 4-622-FDA-1411.  Where should I keep my medication?  Keep out of the  reach of children and pets.  Store at room temperature of 59 to 86 degrees F (15 to 30 degrees C). Throw away any unused medication after the expiration date.  NOTE: This sheet is a summary. It may not cover all possible information. If you have questions about this medicine, talk to your doctor, pharmacist, or health care provider.  © 2023 Elsevier/Gold Standard (2021-12-21 00:00:00)

## 2024-11-27 ENCOUNTER — TELEPHONE (OUTPATIENT)
Dept: CARDIOLOGY | Facility: MEDICAL CENTER | Age: 79
End: 2024-11-27
Payer: MEDICARE

## 2024-11-27 NOTE — TELEPHONE ENCOUNTER
CW    Caller: Vaishali LagosWill    Reported Symptoms:   Reporting dizziness and nausea -she believes it could be her new medications taken after recent procedure.     Recent Blood Pressure/Heart Rate Reading:   N/A    Callback Number:   [phone]    Thank you,   Yanira TAYLOR

## 2024-11-27 NOTE — TELEPHONE ENCOUNTER
To Schedulers, please re-schedule FV as she is unable to make it to current visit scheduled 12/17/2024, thank you!    Called pt [phone] to review CW recommendations.  Pt states is requesting to reschedule follow up visit.  She verbalizes understanding and states no other concerns or questions at this time.  Vaishali is appreciative of information given.

## 2024-11-27 NOTE — DISCHARGE SUMMARY
Discharge Summary    CHIEF COMPLAINT ON ADMISSION  No chief complaint on file.      Reason for Admission  Atherosclerotic heart disease of n*     Admission Date  11/25/2024    CODE STATUS  Full code    HPI & HOSPITAL COURSE  Please see original H&P and consult note specific information  79 y.o. female with a history of coronary artery disease, PSVT on flecainide, hyperlipidemia who presented 11/25/2024 with for worsening shortness of breath, chest tightness.  Patient presented for elective cardiac catheterization which showed multivessel coronary artery disease, mild aortic stenosis.  CT surgery was consulted for expedited CABG which patient declined reporting she wanted to try stents first.       Status post complex PCI with stent placement to the LAD, diagonal, circumflex     Hospital medicine was consulted for medical management.  Patient reports some pain in her right wrist at catheter access site and her chest feels uncomfortable, but much better than prior.  Otherwise denies fever, chills, shortness of breath, palpitations, dizziness/lightheadedness    Patient today is feeling much better, she denies any chest pain, shortness of breath, patient has paroxysmal SVT, cardiology recommending Cardizem at this time and recommended to stop flecainide, cardiology has cleared patient for discharge, patient is going to be discharging today in a stable condition, she will follow-up with primary care doctor and cardiology as outpatient, she is alert oriented follows commands she has expressed understanding for plan of care and agree with it request have been answered.  Patient to continue with dual antiplatelet therapy    Therefore, she is discharged in good and stable condition to home with close outpatient follow-up.    The patient met 2-midnight criteria for an inpatient stay at the time of discharge.    Discharge Date  11/26/2024    FOLLOW UP ITEMS POST DISCHARGE  Primary care physician  Cardiology    DISCHARGE  DIAGNOSES  Principal Problem:    Coronary artery disease due to calcified coronary lesion (POA: Yes)  Active Problems:    Hypercholesteremia (Chronic) (POA: Yes)    Paroxysmal supraventricular tachycardia (HCC) (Chronic) (POA: Yes)    GERD (gastroesophageal reflux disease) (POA: Yes)    Mild aortic stenosis (POA: Yes)    Prediabetes (POA: Yes)  Resolved Problems:    * No resolved hospital problems. *      FOLLOW UP  Future Appointments   Date Time Provider Department Center   12/17/2024 10:45 AM Mitch Patton M.D. [facility] None     Jose Carlos Lacey M.D.  [address]  [address]  [facility] [address]  [phone]    Follow up        MEDICATIONS ON DISCHARGE     Medication List        START taking these medications        Instructions   atorvastatin 80 MG tablet  Commonly known as: Lipitor   Take 1 Tablet by mouth every evening.  Dose: 80 mg     clopidogrel 75 MG Tabs  Start taking on: November 27, 2024  Commonly known as: Plavix   Take 1 Tablet by mouth every day.  Dose: 75 mg     DILTIAZem  MG Cp24  Start taking on: November 27, 2024  Commonly known as: Cardizem CD   Take 1 Capsule by mouth every day.  Dose: 120 mg            CONTINUE taking these medications        Instructions   aspirin 81 MG tablet   Take 81 mg by mouth every evening.  Dose: 81 mg     estradiol 0.1 MG/GM vaginal cream  Commonly known as: Estrace      pantoprazole 40 MG Tbec  Commonly known as: Protonix   Take 1 Tablet by mouth every day.  Dose: 1 Tablet     Xiidra 5 % Soln  Generic drug: Lifitegrast             STOP taking these medications      alendronate 35 MG tablet  Commonly known as: Fosamax     esomeprazole 40 MG delayed-release capsule  Commonly known as: NexIUM     flecainide 50 MG tablet  Commonly known as: Tambocor     furosemide 20 MG Tabs  Commonly known as: Lasix     simvastatin 40 MG Tabs  Commonly known as: Zocor     spironolactone 25 MG Tabs  Commonly known as: Aldactone              Allergies  No Known Allergies    DIET  No  orders of the defined types were placed in this encounter.      ACTIVITY  As tolerated.  Weight bearing as tolerated    CONSULTATIONS  Cardiology    PROCEDURES  Cardiac cath    LABORATORY  Lab Results   Component Value Date    SODIUM 134 (L) 11/26/2024    POTASSIUM 3.5 (L) 11/26/2024    CHLORIDE 102 11/26/2024    CO2 22 11/26/2024    GLUCOSE 140 (H) 11/26/2024    BUN 10 11/26/2024    CREATININE 0.59 11/26/2024        Lab Results   Component Value Date    WBC 9.2 11/26/2024    HEMOGLOBIN 11.4 (L) 11/26/2024    HEMATOCRIT 33.3 (L) 11/26/2024    PLATELETCT 227 11/26/2024   Hypokalemia was replaced before discharge  Hyponatremia likely due to mild hyperglycemia    Total time of the discharge process exceeds 32 minutes.

## 2024-11-27 NOTE — TELEPHONE ENCOUNTER
"Returned pt call [phone] and reviewed concerns.  Pt states, \"I still don't feel good\" and is wondering if it was due to her new medications clopidogrel and diltiazem.  She states she's still experiencing palpitations.  Vaishali states, \"I am not dizzy\".  Advised on hydration as it may be from yesterdays procedure and monitor her symptoms throughout the weekend.  Advised if she still is experiencing symptoms next week to reach out to our office. Reviewed [employer] message advise.  She verbalizes understanding and states no other concerns or questions at this time.  Vaishali is appreciative of information given.  "

## 2024-12-02 ENCOUNTER — PATIENT MESSAGE (OUTPATIENT)
Dept: CARDIOLOGY | Facility: MEDICAL CENTER | Age: 79
End: 2024-12-02
Payer: MEDICARE

## 2024-12-02 DIAGNOSIS — I47.10 PAROXYSMAL SUPRAVENTRICULAR TACHYCARDIA (HCC): ICD-10-CM

## 2024-12-02 DIAGNOSIS — I47.19 PAT (PAROXYSMAL ATRIAL TACHYCARDIA) (HCC): ICD-10-CM

## 2024-12-02 RX ORDER — DRONEDARONE 400 MG/1
400 TABLET, FILM COATED ORAL 2 TIMES DAILY WITH MEALS
Qty: 60 TABLET | Refills: 11 | Status: SHIPPED | OUTPATIENT
Start: 2024-12-02

## 2024-12-02 NOTE — PATIENT COMMUNICATION
To CW , please review pt concerns below regarding diltiazem and palpitation/SOB symptoms and reply directly to pt regarding advise, thank you!

## 2024-12-03 ENCOUNTER — TELEPHONE (OUTPATIENT)
Dept: CARDIOLOGY | Facility: MEDICAL CENTER | Age: 79
End: 2024-12-03
Payer: MEDICARE

## 2024-12-03 DIAGNOSIS — I47.10 PAROXYSMAL SUPRAVENTRICULAR TACHYCARDIA (HCC): ICD-10-CM

## 2024-12-03 RX ORDER — DRONEDARONE 400 MG/1
400 TABLET, FILM COATED ORAL 2 TIMES DAILY WITH MEALS
Qty: 60 TABLET | Refills: 3 | Status: SHIPPED | OUTPATIENT
Start: 2024-12-03

## 2024-12-03 NOTE — TELEPHONE ENCOUNTER
Good morning!  Upon running a test claim this medication per insurance requires a 90day supply or 100 day supply.  A 90 day supply is $400 and 100 day is $466. There is no FA or samples for this medication. Is there an alternative or would CW be apposed to the patient starting this medication in January when I can get her FA?

## 2024-12-04 NOTE — TELEPHONE ENCOUNTER
There might be samples at [facility], I sent a Rx, if not available she can  a 30 days supply or wiat until January

## 2024-12-04 NOTE — TELEPHONE ENCOUNTER
S/w Keiko clarifying CW telephone note 12/3/24.  She verbalizes understanding and states no other concerns or questions at this time.

## 2024-12-04 NOTE — TELEPHONE ENCOUNTER
Good morning,   As previously mentioned in the first encounter there are no samples or FA and insurance requires a 90day supply. The insurance will not pay anything for a 30day supply. The patient can not fill this medication until January, is there an alternative as this medication is not affordable. There is also 2 RX's of this medication please dc the RX from 12/3

## 2024-12-06 ENCOUNTER — TELEPHONE (OUTPATIENT)
Dept: CARDIOLOGY | Facility: MEDICAL CENTER | Age: 79
End: 2024-12-06
Payer: MEDICARE

## 2024-12-06 DIAGNOSIS — I47.10 PAROXYSMAL SUPRAVENTRICULAR TACHYCARDIA (HCC): ICD-10-CM

## 2024-12-06 DIAGNOSIS — R00.2 PALPITATIONS: ICD-10-CM

## 2024-12-06 RX ORDER — METOPROLOL TARTRATE 25 MG/1
25 TABLET, FILM COATED ORAL EVERY 6 HOURS
Qty: 360 TABLET | Refills: 3 | Status: SHIPPED | OUTPATIENT
Start: 2024-12-06 | End: 2024-12-06 | Stop reason: SDUPTHER

## 2024-12-06 RX ORDER — METOPROLOL TARTRATE 25 MG/1
25 TABLET, FILM COATED ORAL EVERY 6 HOURS
Qty: 360 TABLET | Refills: 1 | Status: SHIPPED | OUTPATIENT
Start: 2024-12-06

## 2024-12-06 RX ORDER — METOPROLOL TARTRATE 25 MG/1
25 TABLET, FILM COATED ORAL EVERY 6 HOURS
COMMUNITY
End: 2024-12-06 | Stop reason: SDUPTHER

## 2024-12-06 NOTE — TELEPHONE ENCOUNTER
CW    Caller: Shaniqua Solis    Topic/issue: Patient returning call regarding medical advise.     Callback Number: [phone]    Thank you,  Matilde LI

## 2024-12-06 NOTE — TELEPHONE ENCOUNTER
Caller: Shaniqua Solis     Topic/issue: Patient states her heart palpitations are still very frequent and strong after her stent was placed a week ago. Patient was then placed on Metoprolol 25 mg by her PCP but was only given a week supply she will be out Monday. She also adds her lightheadedness will not go away. Patient also adds short of breath . Denies swelling. Patient does not have a device to monitor her BP or pulse.      Patient states her Metoprolol reads that she may take one every six days.     Pharmacy :  [id number] - [place], [place] - [address] [[id number]]     Callback Number: [phone]      Thank you  Rhianna DEJESUS

## 2024-12-06 NOTE — TELEPHONE ENCOUNTER
Phone Number Called: [phone]    Call outcome: Spoke to patient regarding message below.    Message: Called to discuss symptoms. PCP gave her 1 week supply of 25mg metoprolol tartrate every 6 hours. She stated it was helping her symptoms, she took every 6 hours and palpitations subsided but lightheadedness continued.  She has not picked up Multaq yet, advised to get that and a BP machine. She is leaving now for that medication. Patient asking if she should take metoprolol as well as Multaq, advised will notify CW for recommendations. No appointments available for today. Answered all questions and concerns, appreciative of call.     CW: Please advise on metoprolol and Multaq. Thanks!

## 2024-12-06 NOTE — TELEPHONE ENCOUNTER
Phone Number Called: [phone]    Call outcome: Did not leave a detailed message. Requested patient to call back.    Message: Called to discuss symptoms and medications.

## 2024-12-07 NOTE — TELEPHONE ENCOUNTER
Phone Number Called: [phone]    Call outcome: Spoke to patient regarding message below.    Message: Called to relay CW recommendations. She picked up Multaq and already took it, feeling better. Confirmed pharmacy for metoprolol. Answered all questions and concerns, appreciative of call.     RX ordered.

## 2024-12-21 ENCOUNTER — PATIENT MESSAGE (OUTPATIENT)
Dept: CARDIOLOGY | Facility: MEDICAL CENTER | Age: 79
End: 2024-12-21
Payer: MEDICARE

## 2024-12-21 DIAGNOSIS — I25.84 CORONARY ARTERY DISEASE DUE TO CALCIFIED CORONARY LESION: ICD-10-CM

## 2024-12-21 DIAGNOSIS — I25.10 CORONARY ARTERY DISEASE DUE TO CALCIFIED CORONARY LESION: ICD-10-CM

## 2024-12-30 RX ORDER — CLOPIDOGREL BISULFATE 75 MG/1
75 TABLET ORAL DAILY
Qty: 90 TABLET | Refills: 0 | Status: SHIPPED | OUTPATIENT
Start: 2024-12-30

## 2024-12-30 RX ORDER — ATORVASTATIN CALCIUM 80 MG/1
80 TABLET, FILM COATED ORAL EVERY EVENING
Qty: 90 TABLET | Refills: 0 | Status: SHIPPED | OUTPATIENT
Start: 2024-12-30

## 2024-12-30 RX ORDER — DILTIAZEM HYDROCHLORIDE 120 MG/1
120 CAPSULE, COATED, EXTENDED RELEASE ORAL DAILY
Qty: 90 CAPSULE | Refills: 0 | Status: SHIPPED | OUTPATIENT
Start: 2024-12-30

## 2024-12-30 NOTE — PATIENT COMMUNICATION
Medications sent to preferred pharmacy as requested.    Replied to pt via [employer] regarding findings.

## 2025-01-09 ENCOUNTER — OFFICE VISIT (OUTPATIENT)
Dept: CARDIOLOGY | Facility: MEDICAL CENTER | Age: 80
End: 2025-01-09
Attending: INTERNAL MEDICINE
Payer: MEDICARE

## 2025-01-09 VITALS
DIASTOLIC BLOOD PRESSURE: 68 MMHG | BODY MASS INDEX: 24.17 KG/M2 | HEART RATE: 83 BPM | OXYGEN SATURATION: 97 % | WEIGHT: 128 LBS | RESPIRATION RATE: 16 BRPM | SYSTOLIC BLOOD PRESSURE: 100 MMHG | HEIGHT: 61 IN

## 2025-01-09 DIAGNOSIS — I35.1 MILD AORTIC REGURGITATION: ICD-10-CM

## 2025-01-09 DIAGNOSIS — I25.83 CORONARY ARTERY DISEASE DUE TO LIPID RICH PLAQUE: ICD-10-CM

## 2025-01-09 DIAGNOSIS — E78.5 DYSLIPIDEMIA: Chronic | ICD-10-CM

## 2025-01-09 DIAGNOSIS — I35.0 MILD AORTIC STENOSIS: ICD-10-CM

## 2025-01-09 DIAGNOSIS — I25.84 CORONARY ARTERY DISEASE DUE TO CALCIFIED CORONARY LESION: ICD-10-CM

## 2025-01-09 DIAGNOSIS — I47.19 PAT (PAROXYSMAL ATRIAL TACHYCARDIA) (HCC): ICD-10-CM

## 2025-01-09 DIAGNOSIS — I51.7 LEFT VENTRICULAR HYPERTROPHY: ICD-10-CM

## 2025-01-09 DIAGNOSIS — I25.10 CORONARY ARTERY DISEASE DUE TO CALCIFIED CORONARY LESION: ICD-10-CM

## 2025-01-09 DIAGNOSIS — I47.10 PAROXYSMAL SUPRAVENTRICULAR TACHYCARDIA (HCC): Chronic | ICD-10-CM

## 2025-01-09 DIAGNOSIS — Z95.5 PRESENCE OF DRUG COATED STENT IN CORONARY ARTERY: ICD-10-CM

## 2025-01-09 DIAGNOSIS — I70.0 ATHEROSCLEROSIS OF ABDOMINAL AORTA (HCC): ICD-10-CM

## 2025-01-09 DIAGNOSIS — I25.10 CORONARY ARTERY DISEASE DUE TO LIPID RICH PLAQUE: ICD-10-CM

## 2025-01-09 PROCEDURE — 99212 OFFICE O/P EST SF 10 MIN: CPT | Performed by: INTERNAL MEDICINE

## 2025-01-09 PROCEDURE — 3074F SYST BP LT 130 MM HG: CPT | Performed by: INTERNAL MEDICINE

## 2025-01-09 PROCEDURE — G2211 COMPLEX E/M VISIT ADD ON: HCPCS | Performed by: INTERNAL MEDICINE

## 2025-01-09 PROCEDURE — 3078F DIAST BP <80 MM HG: CPT | Performed by: INTERNAL MEDICINE

## 2025-01-09 PROCEDURE — 99214 OFFICE O/P EST MOD 30 MIN: CPT | Performed by: INTERNAL MEDICINE

## 2025-01-09 RX ORDER — CLOPIDOGREL BISULFATE 75 MG/1
75 TABLET ORAL DAILY
Qty: 100 TABLET | Refills: 3 | Status: SHIPPED | OUTPATIENT
Start: 2025-01-09

## 2025-01-09 RX ORDER — ATORVASTATIN CALCIUM 80 MG/1
80 TABLET, FILM COATED ORAL EVERY EVENING
Qty: 100 TABLET | Refills: 3 | Status: SHIPPED | OUTPATIENT
Start: 2025-01-09

## 2025-01-09 RX ORDER — DRONEDARONE 400 MG/1
400 TABLET, FILM COATED ORAL 2 TIMES DAILY WITH MEALS
Qty: 200 TABLET | Refills: 3 | Status: SHIPPED | OUTPATIENT
Start: 2025-01-09

## 2025-01-09 RX ORDER — DILTIAZEM HYDROCHLORIDE 120 MG/1
120 CAPSULE, COATED, EXTENDED RELEASE ORAL DAILY
Qty: 100 CAPSULE | Refills: 3 | Status: SHIPPED | OUTPATIENT
Start: 2025-01-09

## 2025-01-09 ASSESSMENT — FIBROSIS 4 INDEX: FIB4 SCORE: 2.2

## 2025-01-09 NOTE — PROGRESS NOTES
"Chief Complaint   Patient presents with    Tachycardia     F/V Dx:Paroxysmal supraventricular tachycardia (HCC)      Hyperlipidemia     F/V Dx: Hypercholesteremia       Subjective     Vaishali Solis is a 79 y.o. female who presents today with CAD s/p PCI     Did well with multivessel    Planning on cardiac rehab    Past Medical History:   Diagnosis Date    Anesthesia     \"couldn't open eye or talk\"    Arrhythmia     PSVT    CAD (coronary artery disease) 11/10/2010    Cardiac arrhythmia     Chickenpox     Crohns disease     Diarrhea     Dizziness 2010    GERD (gastroesophageal reflux disease)     Heart burn     Heartburn     High cholesterol     Hypercholesteremia 11/10/2010    Indigestion     Influenza     Mumps     Murmur 11/10/2010    Osteoporosis     Palpitations 11/10/2010    Paroxysmal supraventricular tachycardia (HCC) 11/10/2010    Pneumonia     Shortness of breath     Tonsillitis     Unspecified cataract     IOL OD     Past Surgical History:   Procedure Laterality Date    KNEE ARTHROSCOPY Left 2017    Procedure: KNEE ARTHROSCOPY;  Surgeon: Al Heredia M.D.;  Location: SURGERY [facility];  Service:     MENISCECTOMY, KNEE, MEDIAL Left 2017    Procedure: MEDIAL MENISCECTOMY - PARTIAL, MUIR;  Surgeon: Al Heredia M.D.;  Location: [facility];  Service:     CATARACT PHACO WITH IOL Right 2015    Procedure: CATARACT PHACO WITH IOL;  Surgeon: David Llanos M.D.;  Location: [facility];  Service:     ABDOMINOPLASTY      and face lift    INGUINAL HERNIA REPAIR Right 2014    CARPAL TUNNEL RELEASE  2012    SALPINGOSTOMY Bilateral     repair of tubes    ARTHROSCOPY, KNEE      PRIMARY C SECTION  ,      Family History   Problem Relation Age of Onset    Cancer Mother         Breast    Heart Attack Father          at age 83 of heart attack.    Heart Attack Brother         heart attack at age 41 and bypass surgery    Heart Attack Brother         " bypass surgery at age 62    Cancer Maternal Aunt         Breast     Social History     Socioeconomic History    Marital status:      Spouse name: Not on file    Number of children: Not on file    Years of education: Not on file    Highest education level: Not on file   Occupational History    Occupation:      Employer: OTHER   Tobacco Use    Smoking status: Never    Smokeless tobacco: Never   Vaping Use    Vaping status: Never Used   Substance and Sexual Activity    Alcohol use: Not Currently     Comment: Rarely    Drug use: Never    Sexual activity: Not on file   Other Topics Concern    Not on file   Social History Narrative    Not on file     Social Drivers of Health     Financial Resource Strain: Not on file   Food Insecurity: Not on file   Transportation Needs: Not on file   Physical Activity: Not on file   Stress: Not on file   Social Connections: Not on file   Intimate Partner Violence: Not on file   Housing Stability: Not on file     No Known Allergies  Outpatient Encounter Medications as of 1/9/2025   Medication Sig Dispense Refill    dronedarone (MULTAQ) 400 MG Tab Take 1 Tablet by mouth 2 times a day with meals. 200 Tablet 3    clopidogrel (PLAVIX) 75 MG Tab Take 1 Tablet by mouth every day. 100 Tablet 3    atorvastatin (LIPITOR) 80 MG tablet Take 1 Tablet by mouth every evening. 100 Tablet 3    DILTIAZem CD (CARDIZEM CD) 120 MG CAPSULE SR 24 HR Take 1 Capsule by mouth every day. 100 Capsule 3    pantoprazole (PROTONIX) 40 MG Tablet Delayed Response Take 1 Tablet by mouth every day.      estradiol (ESTRACE) 0.1 MG/GM vaginal cream       aspirin 81 MG tablet Take 81 mg by mouth every evening.      [DISCONTINUED] atorvastatin (LIPITOR) 80 MG tablet Take 1 Tablet by mouth every evening. Please keep follow up visit scheduled 1/9/2025 to ensure further refills, thank you! 90 Tablet 0    [DISCONTINUED] clopidogrel (PLAVIX) 75 MG Tab Take 1 Tablet by mouth every day. Please keep follow up  "visit scheduled 1/9/2025 to ensure further refills, thank you! 90 Tablet 0    [DISCONTINUED] DILTIAZem CD (CARDIZEM CD) 120 MG CAPSULE SR 24 HR Take 1 Capsule by mouth every day. Please keep follow up visit scheduled 1/9/2025 to ensure further refills, thank you! 90 Capsule 0    metoprolol tartrate (LOPRESSOR) 25 MG Tab Take 1 Tablet by mouth every 6 hours. (Patient not taking: Reported on 1/9/2025) 360 Tablet 1    [DISCONTINUED] dronedarone (MULTAQ) 400 MG Tab Take 1 Tablet by mouth 2 times a day with meals. 60 Tablet 3    [DISCONTINUED] dronedarone (MULTAQ) 400 MG Tab Take 1 Tablet by mouth 2 times a day with meals. 60 Tablet 11    [DISCONTINUED] atorvastatin (LIPITOR) 80 MG tablet Take 1 Tablet by mouth every evening. 30 Tablet 0    [DISCONTINUED] clopidogrel (PLAVIX) 75 MG Tab Take 1 Tablet by mouth every day. 30 Tablet 1    [DISCONTINUED] DILTIAZem CD (CARDIZEM CD) 120 MG CAPSULE SR 24 HR Take 1 Capsule by mouth every day. 30 Capsule 0    [DISCONTINUED] XIIDRA 5 % Solution        No facility-administered encounter medications on file as of 1/9/2025.     ROS           Objective     /68 (BP Location: Left arm, Patient Position: Sitting, BP Cuff Size: Adult)   Pulse 83   Resp 16   Ht 1.549 m (5' 1\")   Wt 58.1 kg (128 lb)   SpO2 97%   BMI 24.19 kg/m²     Physical Exam  Constitutional:       General: She is not in acute distress.     Appearance: She is not diaphoretic.   Eyes:      General: No scleral icterus.  Neck:      Vascular: No JVD.   Cardiovascular:      Rate and Rhythm: Normal rate.      Heart sounds: Normal heart sounds. No murmur heard.     No friction rub. No gallop.   Pulmonary:      Effort: No respiratory distress.      Breath sounds: No wheezing or rales.   Abdominal:      General: Bowel sounds are normal.      Palpations: Abdomen is soft.   Musculoskeletal:      Right lower leg: No edema.      Left lower leg: No edema.   Skin:     Findings: No rash.   Neurological:      Mental Status: She " is alert. Mental status is at baseline.   Psychiatric:         Mood and Affect: Mood normal.          We reviewed in person the most recent labs  Recent Results (from the past 30 weeks)   EKG    Collection Time: 10/02/24  1:24 PM   Result Value Ref Range    Report       [facility]    Test Date:  2024-10-02  Pt Name:    NADEGE SMITH               Department: [facility]  MRN:        [id number]                      Room:  Gender:     Female                       Technician:   :        1945                   Requested By:SAUL RAINEY  Order #:    [id number]                    Reading MD: Saul Rainey MD    Measurements  Intervals                                Axis  Rate:       87                           P:          23  WY:         119                          QRS:        -36  QRSD:       99                           T:          8  QT:         346  QTc:        417    Interpretive Statements  Sinus rhythm  Atrial premature complexes  Borderline short WY interval  Left axis deviation  Consider anterior infarct  Compared to ECG 2024 15:31:07  NO SIGNIFICANT CHANGES  Electronically Signed On 10- 16:19:48 PDT by Saul Rainey MD     proBrain Natriuretic Peptide, NT    Collection Time: 10/02/24  2:25 PM   Result Value Ref Range    NT-proBNP 444 (H) 0 - 125 pg/mL   EC-ECHOCARDIOGRAM COMPLETE W/O CONT    Collection Time: 10/03/24  3:04 PM   Result Value Ref Range    Eject.Frac. MOD BP 67.45     Eject.Frac. MOD 4C 64.33     Eject.Frac. MOD 2C 71.02     Left Ventrical Ejection Fraction 67    COMP METABOLIC PANEL    Collection Time: 24  8:20 AM   Result Value Ref Range    Sodium 134 (L) 135 - 145 mmol/L    Potassium 3.7 3.6 - 5.5 mmol/L    Chloride 100 96 - 112 mmol/L    Co2 25 20 - 33 mmol/L    Anion Gap 9.0 7.0 - 16.0    Glucose 107 (H) 65 - 99 mg/dL    Bun 12 8 - 22 mg/dL    Creatinine 0.64 0.50 - 1.40 mg/dL    Calcium 9.1 8.5 - 10.5 mg/dL    Correct Calcium  9.3 8.5 - 10.5 mg/dL    AST(SGOT) 18 12 - 45 U/L    ALT(SGPT) 16 2 - 50 U/L    Alkaline Phosphatase 83 30 - 99 U/L    Total Bilirubin 0.7 0.1 - 1.5 mg/dL    Albumin 3.8 3.2 - 4.9 g/dL    Total Protein 6.9 6.0 - 8.2 g/dL    Globulin 3.1 1.9 - 3.5 g/dL    A-G Ratio 1.2 g/dL   CBC WITHOUT DIFFERENTIAL    Collection Time: 24  8:20 AM   Result Value Ref Range    WBC 6.3 4.8 - 10.8 K/uL    RBC 4.10 (L) 4.20 - 5.40 M/uL    Hemoglobin 12.6 12.0 - 16.0 g/dL    Hematocrit 37.4 37.0 - 47.0 %    MCV 91.2 81.4 - 97.8 fL    MCH 30.7 27.0 - 33.0 pg    MCHC 33.7 32.2 - 35.5 g/dL    RDW 42.7 35.9 - 50.0 fL    Platelet Count 251 164 - 446 K/uL    MPV 10.1 9.0 - 12.9 fL   PT    Collection Time: 24  8:20 AM   Result Value Ref Range    PT 13.2 12.0 - 14.6 sec    INR 1.00 0.87 - 1.13   APTT    Collection Time: 24  8:20 AM   Result Value Ref Range    APTT 29.4 24.7 - 36.0 sec   ESTIMATED GFR    Collection Time: 24  8:20 AM   Result Value Ref Range    GFR (CKD-EPI) 90 >60 mL/min/1.73 m 2   EKG    Collection Time: 24  8:23 AM   Result Value Ref Range    Report       [facility]    Test Date:  2024  Pt Name:    NADEGE SMIHT               Department: [facility]  MRN:        [id number]                      Room:  Gender:     Female                       Technician: DEVIN  :        1945                   Requested By:TIKA OLIVO  Order #:    [id number]                    Reading MD: Elia Mobley MD    Measurements  Intervals                                Axis  Rate:       75                           P:          21  RI:         166                          QRS:        -36  QRSD:       92                           T:          41  QT:         396  QTc:        443    Interpretive Statements  Sinus rhythm  Left axis deviation  Nonspecific ST-T wave changes  Electronically Signed On 2024 09:17:13 PST by Elia Mobley MD     POCT activated clotting time device results    Collection Time: 24  3:23  PM   Result Value Ref Range    Istat Activated Clotting Time 135 74 - 137 sec   EKG STAT    Collection Time: 24  5:12 PM   Result Value Ref Range    Report       [facility]    Test Date:  2024  Pt Name:    NADEGE SMITH               Department: 183  MRN:        [id number]                      Room:       [facility]  Gender:     Female                       Technician: [facility]  :        1945                   Requested By:TIKA OLIVO  Order #:    [id number]                    Reading MD: Elia Mobley MD    Measurements  Intervals                                Axis  Rate:       74                           P:          2  OR:         168                          QRS:        -39  QRSD:       96                           T:          36  QT:         408  QTc:        453    Interpretive Statements  Sinus rhythm  Left axis deviation  Nonspecific ST-T wave changes  Electronically Signed On 2024 18:20:32 PST by Elia Mobley MD     CBC WITH DIFFERENTIAL    Collection Time: 24 12:57 AM   Result Value Ref Range    WBC 9.2 4.8 - 10.8 K/uL    RBC 3.74 (L) 4.20 - 5.40 M/uL    Hemoglobin 11.4 (L) 12.0 - 16.0 g/dL    Hematocrit 33.3 (L) 37.0 - 47.0 %    MCV 89.0 81.4 - 97.8 fL    MCH 30.5 27.0 - 33.0 pg    MCHC 34.2 32.2 - 35.5 g/dL    RDW 41.1 35.9 - 50.0 fL    Platelet Count 227 164 - 446 K/uL    MPV 9.5 9.0 - 12.9 fL    Neutrophils-Polys 76.80 (H) 44.00 - 72.00 %    Lymphocytes 15.30 (L) 22.00 - 41.00 %    Monocytes 6.80 0.00 - 13.40 %    Eosinophils 0.40 0.00 - 6.90 %    Basophils 0.40 0.00 - 1.80 %    Immature Granulocytes 0.30 0.00 - 0.90 %    Nucleated RBC 0.00 0.00 - 0.20 /100 WBC    Neutrophils (Absolute) 7.04 1.82 - 7.42 K/uL    Lymphs (Absolute) 1.40 1.00 - 4.80 K/uL    Monos (Absolute) 0.62 0.00 - 0.85 K/uL    Eos (Absolute) 0.04 0.00 - 0.51 K/uL    Baso (Absolute) 0.04 0.00 - 0.12 K/uL    Immature Granulocytes (abs) 0.03 0.00 - 0.11 K/uL    NRBC (Absolute) 0.00 K/uL   Lipid Profile (Lipid  Panel) Fasting    Collection Time: 11/26/24 12:57 AM   Result Value Ref Range    Cholesterol,Tot 120 100 - 199 mg/dL    Triglycerides 79 0 - 149 mg/dL    HDL 41 >=40 mg/dL    LDL 63 <100 mg/dL   Comp Metabolic Panel (CMP)    Collection Time: 11/26/24 12:57 AM   Result Value Ref Range    Sodium 134 (L) 135 - 145 mmol/L    Potassium 3.5 (L) 3.6 - 5.5 mmol/L    Chloride 102 96 - 112 mmol/L    Co2 22 20 - 33 mmol/L    Anion Gap 10.0 7.0 - 16.0    Glucose 140 (H) 65 - 99 mg/dL    Bun 10 8 - 22 mg/dL    Creatinine 0.59 0.50 - 1.40 mg/dL    Calcium 8.3 (L) 8.5 - 10.5 mg/dL    Correct Calcium 8.9 8.5 - 10.5 mg/dL    AST(SGOT) 19 12 - 45 U/L    ALT(SGPT) 9 2 - 50 U/L    Alkaline Phosphatase 68 30 - 99 U/L    Total Bilirubin 0.6 0.1 - 1.5 mg/dL    Albumin 3.2 3.2 - 4.9 g/dL    Total Protein 5.7 (L) 6.0 - 8.2 g/dL    Globulin 2.5 1.9 - 3.5 g/dL    A-G Ratio 1.3 g/dL   HEMOGLOBIN A1C    Collection Time: 11/26/24 12:57 AM   Result Value Ref Range    Glycohemoglobin 5.7 (H) 4.0 - 5.6 %    Est Avg Glucose 117 mg/dL   MAGNESIUM    Collection Time: 11/26/24 12:57 AM   Result Value Ref Range    Magnesium 1.6 1.5 - 2.5 mg/dL   ESTIMATED GFR    Collection Time: 11/26/24 12:57 AM   Result Value Ref Range    GFR (CKD-EPI) 91 >60 mL/min/1.73 m 2           Assessment & Plan     1. Paroxysmal supraventricular tachycardia (HCC)  dronedarone (MULTAQ) 400 MG Tab      2. Coronary artery disease due to lipid rich plaque - mild on angio 2002        3. PAT (paroxysmal atrial tachycardia) (HCC)  dronedarone (MULTAQ) 400 MG Tab      4. Coronary artery disease due to calcified coronary lesion  clopidogrel (PLAVIX) 75 MG Tab    atorvastatin (LIPITOR) 80 MG tablet    DILTIAZem CD (CARDIZEM CD) 120 MG CAPSULE SR 24 HR      5. Atherosclerosis of abdominal aorta (HCC)        6. Mild aortic regurgitation        7. Mild aortic stenosis        8. Left ventricular hypertrophy        9. Dyslipidemia        10. Presence of drug coated stent in coronary artery             Medical Decision Making: Today's Assessment/Status/Plan:      It was my pleasure to meet with Ms. Solis.    We addressed the management of hypertension at today's visit. Blood pressure is well controlled.  We specifically assessed the labs on hypertension treatment    We addressed the management of dyslipidemia and atherosclerosis at today's visit. She is on appropriate lipid lowering medication.    We addressed the management of atherosclerotic artery disease.  She is on proper antiplatelet, cholesterol management as appropriate.  We addressed the potential side effects and laboratory follow-up for these medications.    I will see Ms. Solis back in 1 year time and encouraged her to follow up with us over the phone or electronically using my Netragonhart as issues arise.    It is my pleasure to participate in the care of Ms. Solis.  Please do not hesitate to contact me with questions or concerns.    Mitch Patton MD PhD [facility]  Cardiologist [facility]    Please note that this dictation was created using voice recognition software. There may be errors I did not discover before finalizing the note.     () Today's E/M visit is associated with medical care services that serve as the continuing focal point for all needed health care services and/or with medical care services that  are part of ongoing care related to a patient's single, serious condition, or a complex condition: This includes  furnishing services to patients on an ongoing basis that result in care that is personalized  to the patient. The services result in a comprehensive, longitudinal, and continuous  relationship with the patient and involve delivery of team-based care that is accessible, coordinated with other practitioners and providers, and integrated with the broader health  care landscape.

## 2025-01-09 NOTE — PATIENT INSTRUCTIONS
A - Antiplatelet - Clopidogrel (PLAVIX) reduces your risk of cardiac event by 27% compared to Aspirin 81 mg daily (HOST EXAM study 2021), prasrugrel (EFFIENT), ticagrelor (BRILINTA)) may be used for the first year.  Aspirin 81 mg daily is associated with a 20% less use of heart event and is used the first year after a cardiac event, stent or CABG.  B - Blood Pressure Control - reduces your risk or heart attack and stroke, the goal is <130/80.  C - Cholesterol Management - statins dramatically reduce your risk; for those that are intolerant to statins, there are alternatives.  D - Diet - MEDITERRANEAN DIET or Cardiac rehab diets, Cardiosmart.org.  E - Exercise - at least 2.5 hours of moderate exercise weekly  (typical brisk walking or similar activity).  F - Fats - VASCEPA, or EPA Fish oil (if Vascepa too expensive) for elevated triglycerides (REDUCE IT trial showed reduction from 22% 5 year MACE to 17%).  G - Good Vibes - meditation, exercise, yoga, Pilates, mindfulness, Mariano-Chi, stress reduction.  H - Heart Failure - betablockers, sucubatril (ENTRESTO) 16% less risk of dying over 3 years, spironolactone, empagliflozin (JARDIANCE) 17% less risk of dying over 2 years, CRT +/- ICD.  I - Inflammation - Colchicine in the LoDoCo2 study in 2020 reduced the risk of heart attack by 30% in 2.5 year follow up.  R - Rehab - Cardiac Rehab reduces risk of dying by 13-24% and need to go to the hospital by 30% within the first year. Compared to regular Cardiac Rehab, Intensive Cardiac Rehab ([place] [facility]) was shown to reduce the risk of major events 17% to 11% and hospitalization for CHF from 8% to 2%. (Nutrients 1436Yto64(11:3263)  S - Smoking - never smoke, if you do smoke ask for help to quit for good. Patients who quit smoking after heart attack have 36% less likely risk of dying.  Resources are 1-800-QUIT-NOW LiveOffice in addition to Chantix, bupropion (Zyban) or nicotine replacement  T - Type II Diabetes  - pills empagliflozin (JARDIANCE) 38% less risk of dying over 4 years, and/or weekly injections: tirzepatide (Mounjaro), semaglutide (Ozempic), liraglutide (Victoza), dulaglutide (Trulicity) ~26% less risk of MACE in 2 years.  V - Vaccines - Annual flu shot and COVID vaccine reduces the risk of serious cardiovascular complications from these deadly infections.  W - Weight - maintain a healthy weight. Semaglutide (WEGOVY) weekly injection was shown to reduce weight by 10% and heart events by 20% for patients with CAD and BMI > 27 in the SELECT trial (6.5% vs 8% in 48 month follow up [facility] 12/2023).      Work on at least 2.5 - 5 hours a week of moderate exercise    Please look into the following diets and incorporate them into your diet  LOW SALT DIET   KEEP YOUR SODIUM EQUAL TO CALORIES AND NO MORE THAN DOUBLE THE CALORIES FOR A LOW SALT DIET    Cardiosmart.org - great resource for American College of Cardiology on heart disease prevention and treatment    FOR TREATMENT OR PREVENTION OF CORONARY ARTERY DISEASE  These three programs are approved by Medicare/Insurers for those with heart disease  [facility] - [facility]  Dr. Monroy's Program for Reversing Heart Disease - Luis Soto's Cardiologist vegetarian-based  [facility] - [place]-based mind-body Program    Mediterranean Diet has been shown to be a hearty healthy diet.    This is a commonly referenced Program  Dr Vera - [place] [facility] [place] (book and documentary) - vegetarian-based    FOR TREATMENT OF BLOOD PRESSURE  DASH DIET - American Heart Association for treatment of HYPERTENSION    FOR TREATMENT OF BAD CHOLESTEROL/FATS  REDUCE PROCESSED SUGAR AS MUCH AS POSSIBLE  INCREASE WHOLE GRAINS/VEGETABLES  INCREASE FIBER    Lowering total cholesterol and LDL (bad) cholesterol:  - Eat leaner cuts of meat, or eliminate altogether if possible red meat, and frequently substitute fish or chicken.  - Limit saturated  fat to no more than 7-10% of total calories no more than 10 g per day is recommended. Some sources of saturated fat include butter, animal fats, hydrogenated vegetable fats and oils, many desserts, whole milk dairy products.  - Replaced saturated fats with polyunsaturated fats and monounsaturated fats. Foods high in monounsaturated fat include nuts, canola oil, avocados, and olives.  - Limit trans fat (processed foods) and replaced with fresh fruits and vegetables  - Recommend nonfat dairy products  - Increase substantially the amount of soluble fiber intake (legumes such as beans, fruit, whole grains).  - Consider nutritional supplements: plant sterile spreads such as Benecol, fish oil,  flaxseed oil, omega-3 acids EPA capsules 2000 mg twice a day, or viscous fiber such as Metamucil  - Attain ideal weight and regular exercise (at least 30 minutes per day of moderate exercise)  ASK ABOUT STATIN OR NON STATIN MEDICATION TO REDUCE YOUR LDL AND HEART RISK    Lowering triglycerides:  - Reduce intake of simple sugar: Desserts, candy, pastries, honey, sodas, sugared cereals, yogurt, Gatorade, sports bars, canned fruit, smoothies, fruit juice, coffee drinks  - Reduced intake of refined starches: Refined Pasta, most bread  - Reduce or abstain from alcohol  - Increase omega-3 fatty acids: Varney, Trout, Mackerel, Herring, Albacore tuna and supplements  - Attain ideal weight and regular exercise (at least 30 minutes per day of moderate exercise)  ASK ABOUT PURIFIED OMEGA 3 EPA or FISH OIL TO REDUCE YOUR TG AND HEART RISK    Elevating HDL (good) cholesterol:  - Increase physical activity  - Increase omega-3 fatty acids and supplements as listed above  - Incorporating appropriate amounts of monounsaturated fats such as nuts, olive oil, canola oil, avocados, olives  - Stop smoking  - Attain ideal weight and regular exercise (at least 30 minutes per day of moderate exercise)

## 2025-01-13 ENCOUNTER — APPOINTMENT (OUTPATIENT)
Dept: RADIOLOGY | Facility: MEDICAL CENTER | Age: 80
End: 2025-01-13
Attending: FAMILY MEDICINE

## 2025-01-14 ENCOUNTER — APPOINTMENT (OUTPATIENT)
Dept: RADIOLOGY | Facility: IMAGING CENTER | Age: 80
End: 2025-01-14
Payer: MEDICARE

## 2025-01-14 ENCOUNTER — OFFICE VISIT (OUTPATIENT)
Dept: URGENT CARE | Facility: CLINIC | Age: 80
End: 2025-01-14
Payer: MEDICARE

## 2025-01-14 VITALS
SYSTOLIC BLOOD PRESSURE: 94 MMHG | TEMPERATURE: 97.6 F | DIASTOLIC BLOOD PRESSURE: 64 MMHG | HEART RATE: 76 BPM | WEIGHT: 126 LBS | HEIGHT: 60 IN | BODY MASS INDEX: 24.74 KG/M2 | RESPIRATION RATE: 16 BRPM | OXYGEN SATURATION: 97 %

## 2025-01-14 DIAGNOSIS — R05.3 PERSISTENT COUGH: ICD-10-CM

## 2025-01-14 PROCEDURE — 3078F DIAST BP <80 MM HG: CPT

## 2025-01-14 PROCEDURE — 71046 X-RAY EXAM CHEST 2 VIEWS: CPT | Mod: TC | Performed by: RADIOLOGY

## 2025-01-14 PROCEDURE — 99213 OFFICE O/P EST LOW 20 MIN: CPT

## 2025-01-14 PROCEDURE — 3074F SYST BP LT 130 MM HG: CPT

## 2025-01-14 RX ORDER — AZITHROMYCIN 250 MG/1
TABLET, FILM COATED ORAL
Qty: 6 TABLET | Refills: 0 | Status: SHIPPED
Start: 2025-01-14 | End: 2025-01-15

## 2025-01-14 RX ORDER — BENZONATATE 100 MG/1
100 CAPSULE ORAL 3 TIMES DAILY PRN
Qty: 60 CAPSULE | Refills: 0 | Status: SHIPPED | OUTPATIENT
Start: 2025-01-14

## 2025-01-14 ASSESSMENT — ENCOUNTER SYMPTOMS
SHORTNESS OF BREATH: 0
DIARRHEA: 0
ABDOMINAL PAIN: 0
VOMITING: 0
SORE THROAT: 0
MYALGIAS: 0
FEVER: 0
HEADACHES: 0
CHILLS: 0
NAUSEA: 0
COUGH: 1

## 2025-01-14 ASSESSMENT — FIBROSIS 4 INDEX: FIB4 SCORE: 2.2

## 2025-01-14 NOTE — PROGRESS NOTES
Subjective:   Shaniqua Solis is a 79 y.o. female who presents for Cough (Will not go away, loss of voice 2 weeks )      Cough  This is a new problem. The current episode started 1 to 4 weeks ago. The problem has been unchanged. Pertinent negatives include no chest pain, chills, ear pain, fever, headaches, myalgias, rash, sore throat, shortness of breath or wheezing. She has tried OTC cough suppressant for the symptoms.       Review of Systems   Constitutional:  Negative for chills, fever and malaise/fatigue.   HENT:  Positive for congestion. Negative for ear pain, hearing loss, sinus pain and sore throat.    Respiratory:  Positive for cough and sputum production. Negative for shortness of breath and wheezing.    Cardiovascular:  Negative for chest pain.   Gastrointestinal:  Negative for abdominal pain, diarrhea, nausea and vomiting.   Genitourinary:  Negative for dysuria.   Musculoskeletal:  Negative for myalgias.   Skin:  Negative for rash.   Neurological:  Negative for headaches.       Medications, Allergies, and current problem list reviewed today in Epic.     Objective:     BP 94/64 (BP Location: Left arm, Patient Position: Sitting, BP Cuff Size: Adult)   Pulse 76   Temp 36.4 °C (97.6 °F) (Temporal)   Resp 16   Ht 1.524 m (5')   Wt 57.2 kg (126 lb)   SpO2 97%     Physical Exam  Vitals and nursing note reviewed.   Constitutional:       General: She is not in acute distress.     Appearance: Normal appearance. She is ill-appearing.   HENT:      Head: Normocephalic and atraumatic.      Right Ear: Tympanic membrane normal.      Left Ear: Tympanic membrane normal.      Nose: Nose normal.      Mouth/Throat:      Mouth: Mucous membranes are moist.   Eyes:      Conjunctiva/sclera: Conjunctivae normal.   Cardiovascular:      Rate and Rhythm: Normal rate.      Heart sounds: Normal heart sounds.   Pulmonary:      Effort: Pulmonary effort is normal. No respiratory distress.      Breath sounds: No stridor or  decreased air movement. No wheezing or rhonchi.   Abdominal:      General: Abdomen is flat.      Palpations: Abdomen is soft.   Musculoskeletal:         General: Normal range of motion.      Cervical back: Normal range of motion.   Skin:     General: Skin is warm and dry.      Capillary Refill: Capillary refill takes less than 2 seconds.   Neurological:      Mental Status: She is alert and oriented to person, place, and time.   Psychiatric:         Mood and Affect: Mood normal.         Behavior: Behavior normal.       RADIOLOGY RESULTS   DX-CHEST-2 VIEWS    Result Date: 1/14/2025 1/14/2025 3:26 PM HISTORY/REASON FOR EXAM:  Cough and congestion for one month. TECHNIQUE/EXAM DESCRIPTION AND NUMBER OF VIEWS: Two views of the chest. COMPARISON:  Chest radiography, 3/25/2024. FINDINGS: Cardiomediastinal silhouette size is within normal limits. Aortic arteriosclerosis. Calcified mediastinal lymph nodes. Slight interval increase in size of the hiatal hernia, now moderate. No pulmonary infiltrates or consolidations are noted. No pleural abnormalities are noted.     1. No acute cardiac or pulmonary abnormalities are identified. 2. Hiatal hernia, slightly enlarging in size in the interval.         Assessment/Plan:       1. Persistent cough  DX-CHEST-2 VIEWS    benzonatate (TESSALON) 100 MG Cap    doxycycline (VIBRAMYCIN) 100 MG Tab    DISCONTINUED: azithromycin (ZITHROMAX) 250 MG Tab        After assessment patient here with persisting cough for the past 2 weeks.  Patient did have also some mild nasal congestion.  Patient reports that the cough has become more productive in nature.  Patient does not have any significant symptoms including shortness of breath or fevers.  Patient is limited on what she can take due to her recent changes in medications.  X-ray was performed in office and showed no acute cardiac or pulmonary abnormalities.  I am concerned due to the length and worsening symptoms I am concerned there is an  underlying bacterial infection.  Initially I did prescribe patient Tessalon Perles and Z-Luis.  After confirming with pharmacy Z-Luis did interfere with patient's current Multaq and we did change this over to doxycycline.  Patient instructed to take everything as prescribed.  Recommend adequate hydration, rest, deep breathing and coughing, ambulation as tolerated, OTC medications.  Patient instructed to monitor for any worsening signs and symptoms of any other concerns patient was instructed to return to urgent care for reevaluation.    Differential diagnosis, natural history, and supportive care discussed. We also reviewed side effects of medication including allergic response, GI upset, tendon injury, rash, sedation etc. Patient and/or guardian voices understanding.      Advised the patient to follow-up with the primary care physician for recheck, reevaluation, and consideration of further management.    I personally reviewed prior external notes and test results pertinent to today's visit as well as additional imaging and testing completed in clinic today.     Please note that this dictation was created using voice recognition software. I have made every reasonable attempt to correct obvious errors, but I expect that there are errors of grammar and possibly content that I did not discover before finalizing the note.    This note was electronically signed by MATILDE Faulkner

## 2025-01-15 RX ORDER — DOXYCYCLINE HYCLATE 100 MG
100 TABLET ORAL 2 TIMES DAILY
Qty: 14 TABLET | Refills: 0 | Status: SHIPPED | OUTPATIENT
Start: 2025-01-15 | End: 2025-01-22

## 2025-01-15 ASSESSMENT — ENCOUNTER SYMPTOMS
WHEEZING: 0
SINUS PAIN: 0
SPUTUM PRODUCTION: 1

## 2025-02-10 ENCOUNTER — NON-PROVIDER VISIT (OUTPATIENT)
Dept: CARDIOLOGY | Facility: MEDICAL CENTER | Age: 80
End: 2025-02-10
Payer: MEDICARE

## 2025-02-10 DIAGNOSIS — Z95.5 STENTED CORONARY ARTERY: Primary | ICD-10-CM

## 2025-02-10 PROCEDURE — G0422 INTENS CARDIAC REHAB W/EXERC: HCPCS | Performed by: INTERNAL MEDICINE

## 2025-02-10 PROCEDURE — G0423 INTENS CARDIAC REHAB NO EXER: HCPCS | Mod: 59 | Performed by: INTERNAL MEDICINE

## 2025-02-11 ENCOUNTER — NON-PROVIDER VISIT (OUTPATIENT)
Dept: CARDIOLOGY | Facility: MEDICAL CENTER | Age: 80
End: 2025-02-11
Payer: MEDICARE

## 2025-02-11 DIAGNOSIS — Z95.5 STENTED CORONARY ARTERY: ICD-10-CM

## 2025-02-11 PROCEDURE — G0423 INTENS CARDIAC REHAB NO EXER: HCPCS | Mod: 59 | Performed by: INTERNAL MEDICINE

## 2025-02-11 PROCEDURE — G0422 INTENS CARDIAC REHAB W/EXERC: HCPCS | Performed by: INTERNAL MEDICINE

## 2025-02-12 ENCOUNTER — NON-PROVIDER VISIT (OUTPATIENT)
Dept: CARDIOLOGY | Facility: MEDICAL CENTER | Age: 80
End: 2025-02-12
Payer: MEDICARE

## 2025-02-12 DIAGNOSIS — Z95.5 STENTED CORONARY ARTERY: ICD-10-CM

## 2025-02-12 PROCEDURE — G0423 INTENS CARDIAC REHAB NO EXER: HCPCS | Mod: 59 | Performed by: INTERNAL MEDICINE

## 2025-02-12 PROCEDURE — G0422 INTENS CARDIAC REHAB W/EXERC: HCPCS | Performed by: INTERNAL MEDICINE

## 2025-02-12 NOTE — PROGRESS NOTES
Shaniqua Tom Will attended Intensive Cardiac Rehab today from 1400 to 1600. During her time she exercised and attended class. Her education today was a video titled: Hypertension and Heart Disease. Patient received handouts and class discussion pertaining to the topic.        no

## 2025-02-12 NOTE — PROGRESS NOTES
Shaniqua Tom Will attended Intensive Cardiac Rehab today from 1400 to 1600. During her time she exercised and attended class. Her education today was a COOKING SCHOOL WORKSHOP titled: TASTY APPS AND SNACKS. Patient received handouts and class discussion pertaining to the topic.

## 2025-02-13 ENCOUNTER — NON-PROVIDER VISIT (OUTPATIENT)
Dept: CARDIOLOGY | Facility: MEDICAL CENTER | Age: 80
End: 2025-02-13
Payer: MEDICARE

## 2025-02-13 DIAGNOSIS — Z95.5 STENTED CORONARY ARTERY: ICD-10-CM

## 2025-02-13 PROCEDURE — G0422 INTENS CARDIAC REHAB W/EXERC: HCPCS | Performed by: INTERNAL MEDICINE

## 2025-02-13 PROCEDURE — G0423 INTENS CARDIAC REHAB NO EXER: HCPCS | Mod: 59 | Performed by: INTERNAL MEDICINE

## 2025-02-13 NOTE — PROGRESS NOTES
Shaniqua Tom Will attended Intensive Cardiac Rehab today from 1400 to 1600. During her time she exercised and attended class. Her education today was a VIDEO titled: HEALTHY MINDS, BODIES AND HEARTS.. Patient received handouts and class discussion pertaining to the topic.

## 2025-02-25 ENCOUNTER — NON-PROVIDER VISIT (OUTPATIENT)
Dept: CARDIOLOGY | Facility: MEDICAL CENTER | Age: 80
End: 2025-02-25
Payer: MEDICARE

## 2025-02-25 DIAGNOSIS — Z95.5 STENTED CORONARY ARTERY: ICD-10-CM

## 2025-02-25 NOTE — PROGRESS NOTES
Shaniqua Tom Will attended Intensive Cardiac Rehab today from 14001 to 1600. During her time she exercised and attended class. Her education today was a video titled: Dining Out. Patient received handouts and class discussion pertaining to the topic.

## 2025-02-26 ENCOUNTER — NON-PROVIDER VISIT (OUTPATIENT)
Dept: CARDIOLOGY | Facility: MEDICAL CENTER | Age: 80
End: 2025-02-26
Payer: MEDICARE

## 2025-02-26 DIAGNOSIS — Z95.5 STENTED CORONARY ARTERY: ICD-10-CM

## 2025-02-27 ENCOUNTER — NON-PROVIDER VISIT (OUTPATIENT)
Dept: CARDIOLOGY | Facility: MEDICAL CENTER | Age: 80
End: 2025-02-27
Payer: MEDICARE

## 2025-02-27 DIAGNOSIS — Z95.5 STENTED CORONARY ARTERY: ICD-10-CM

## 2025-02-27 NOTE — PROGRESS NOTES
Shaniqua Tom Will attended Intensive Cardiac Rehab today from 1400 to 1600. During her time she exercised and attended class. Her education today was a cooking school titled: One Pot Wonders. Patient received handouts and class discussion pertaining to the topic.

## 2025-02-28 NOTE — PROGRESS NOTES
Shaniquamichelle Tom Will attended Intensive Cardiac Rehab today from 1400 to 1600. During her time she exercised and attended class. Her education today was a workshop titled: Dining Out . Patient received handouts and class discussion pertaining to the topic.

## 2025-03-04 ENCOUNTER — NON-PROVIDER VISIT (OUTPATIENT)
Dept: CARDIOLOGY | Facility: MEDICAL CENTER | Age: 80
End: 2025-03-04
Payer: MEDICARE

## 2025-03-04 DIAGNOSIS — Z95.5 STENTED CORONARY ARTERY: ICD-10-CM

## 2025-03-04 PROCEDURE — G0422 INTENS CARDIAC REHAB W/EXERC: HCPCS | Performed by: INTERNAL MEDICINE

## 2025-03-04 PROCEDURE — G0423 INTENS CARDIAC REHAB NO EXER: HCPCS | Mod: 59 | Performed by: INTERNAL MEDICINE

## 2025-03-04 NOTE — PROGRESS NOTES
Shaniqua Tom Will attended Intensive Cardiac Rehab today from 1400 to 1600. During her time she exercised and attended class. Her education today was a VIDEO titled: FACTS ON FATS. Patient received handouts and class discussion pertaining to the topic.

## 2025-03-05 ENCOUNTER — NON-PROVIDER VISIT (OUTPATIENT)
Dept: CARDIOLOGY | Facility: MEDICAL CENTER | Age: 80
End: 2025-03-05
Payer: MEDICARE

## 2025-03-05 DIAGNOSIS — Z95.5 STENTED CORONARY ARTERY: ICD-10-CM

## 2025-03-05 PROCEDURE — G0422 INTENS CARDIAC REHAB W/EXERC: HCPCS | Performed by: INTERNAL MEDICINE

## 2025-03-05 PROCEDURE — G0423 INTENS CARDIAC REHAB NO EXER: HCPCS | Mod: 59 | Performed by: INTERNAL MEDICINE

## 2025-03-06 ENCOUNTER — NON-PROVIDER VISIT (OUTPATIENT)
Dept: CARDIOLOGY | Facility: MEDICAL CENTER | Age: 80
End: 2025-03-06
Payer: MEDICARE

## 2025-03-06 DIAGNOSIS — Z95.5 STENTED CORONARY ARTERY: ICD-10-CM

## 2025-03-06 PROCEDURE — G0423 INTENS CARDIAC REHAB NO EXER: HCPCS | Mod: 59 | Performed by: INTERNAL MEDICINE

## 2025-03-06 PROCEDURE — G0422 INTENS CARDIAC REHAB W/EXERC: HCPCS | Performed by: INTERNAL MEDICINE

## 2025-03-06 NOTE — PROGRESS NOTES
Shaniqua Tom Will attended Intensive Cardiac Rehab today from 1400 to 1600. During her time she exercised and attended class. Her education today was a cooking school titled: Fast Evening Meals. Patient received handouts and class discussion pertaining to the topic.

## 2025-03-07 NOTE — PROGRESS NOTES
Shaniquamichelle Tom Will attended Intensive Cardiac Rehab today from 1400 to 1600. During her time she exercised and attended class. Her education today was a workshop titled: Managing Heart Disease Part 2. Patient received handouts and class discussion pertaining to the topic.

## 2025-03-11 ENCOUNTER — NON-PROVIDER VISIT (OUTPATIENT)
Dept: CARDIOLOGY | Facility: MEDICAL CENTER | Age: 80
End: 2025-03-11
Payer: MEDICARE

## 2025-03-11 DIAGNOSIS — Z95.5 STENTED CORONARY ARTERY: ICD-10-CM

## 2025-03-11 PROCEDURE — G0423 INTENS CARDIAC REHAB NO EXER: HCPCS | Mod: 59 | Performed by: INTERNAL MEDICINE

## 2025-03-11 PROCEDURE — G0422 INTENS CARDIAC REHAB W/EXERC: HCPCS | Performed by: INTERNAL MEDICINE

## 2025-03-11 NOTE — PROGRESS NOTES
Shaniqua Tom Will attended Intensive Cardiac Rehab today from 1400 to 1600. During her time she exercised and attended class. Her education today was a VIDEO titled: HEART DISEASE AND RISK REDUCTION. Patient received handouts and class discussion pertaining to the topic.

## 2025-03-12 ENCOUNTER — NON-PROVIDER VISIT (OUTPATIENT)
Dept: CARDIOLOGY | Facility: MEDICAL CENTER | Age: 80
End: 2025-03-12
Payer: MEDICARE

## 2025-03-12 DIAGNOSIS — Z95.5 STENTED CORONARY ARTERY: ICD-10-CM

## 2025-03-12 PROCEDURE — G0422 INTENS CARDIAC REHAB W/EXERC: HCPCS | Performed by: INTERNAL MEDICINE

## 2025-03-12 PROCEDURE — G0423 INTENS CARDIAC REHAB NO EXER: HCPCS | Mod: 59 | Performed by: INTERNAL MEDICINE

## 2025-03-12 NOTE — PROGRESS NOTES
Shaniqua Tom Will attended Intensive Cardiac Rehab today from 1400 to 1600. During her time she exercised and attended class. Her education today was a nutrition and cooking class titled: Easy Entertainment. Patient received handouts and class discussion pertaining to the topic.

## 2025-03-13 ENCOUNTER — HOSPITAL ENCOUNTER (OUTPATIENT)
Dept: RADIOLOGY | Facility: MEDICAL CENTER | Age: 80
End: 2025-03-13
Attending: FAMILY MEDICINE
Payer: MEDICARE

## 2025-03-13 ENCOUNTER — NON-PROVIDER VISIT (OUTPATIENT)
Dept: CARDIOLOGY | Facility: MEDICAL CENTER | Age: 80
End: 2025-03-13
Payer: MEDICARE

## 2025-03-13 DIAGNOSIS — Z95.5 STENTED CORONARY ARTERY: ICD-10-CM

## 2025-03-13 DIAGNOSIS — M81.0 AGE-RELATED OSTEOPOROSIS WITHOUT CURRENT PATHOLOGICAL FRACTURE: ICD-10-CM

## 2025-03-13 DIAGNOSIS — Z12.31 VISIT FOR SCREENING MAMMOGRAM: ICD-10-CM

## 2025-03-13 PROCEDURE — 77067 SCR MAMMO BI INCL CAD: CPT

## 2025-03-13 PROCEDURE — 77080 DXA BONE DENSITY AXIAL: CPT

## 2025-03-13 PROCEDURE — G0423 INTENS CARDIAC REHAB NO EXER: HCPCS | Mod: 59 | Performed by: INTERNAL MEDICINE

## 2025-03-13 PROCEDURE — G0422 INTENS CARDIAC REHAB W/EXERC: HCPCS | Performed by: INTERNAL MEDICINE

## 2025-03-13 NOTE — PROGRESS NOTES
Shaniqua Tom Will attended Intensive Cardiac Rehab today from 1400 to 1600. During her time she exercised and attended class. Her education today was a video titled: Calorie Diversity. Patient received handouts and class discussion pertaining to the topic.

## 2025-03-19 ENCOUNTER — NON-PROVIDER VISIT (OUTPATIENT)
Dept: CARDIOLOGY | Facility: MEDICAL CENTER | Age: 80
End: 2025-03-19
Payer: MEDICARE

## 2025-03-19 DIAGNOSIS — Z95.5 STENTED CORONARY ARTERY: ICD-10-CM

## 2025-03-19 PROCEDURE — G0422 INTENS CARDIAC REHAB W/EXERC: HCPCS | Performed by: INTERNAL MEDICINE

## 2025-03-19 PROCEDURE — G0423 INTENS CARDIAC REHAB NO EXER: HCPCS | Mod: 59 | Performed by: INTERNAL MEDICINE

## 2025-03-19 NOTE — PROGRESS NOTES
Shaniqua Tom Will attended Intensive Cardiac Rehab today from 1400 to 1600. During her time she exercised and attended class. Her education today was a COOKING CLASS titled: PERSONALIZE YOUR PRITIKIN PLAN. Patient received handouts and class discussion pertaining to the topic.

## 2025-03-20 ENCOUNTER — NON-PROVIDER VISIT (OUTPATIENT)
Dept: CARDIOLOGY | Facility: MEDICAL CENTER | Age: 80
End: 2025-03-20
Payer: MEDICARE

## 2025-03-20 DIAGNOSIS — Z95.5 STENTED CORONARY ARTERY: ICD-10-CM

## 2025-03-20 PROCEDURE — G0422 INTENS CARDIAC REHAB W/EXERC: HCPCS | Performed by: INTERNAL MEDICINE

## 2025-03-20 PROCEDURE — G0423 INTENS CARDIAC REHAB NO EXER: HCPCS | Mod: 59 | Performed by: INTERNAL MEDICINE

## 2025-03-20 NOTE — PROGRESS NOTES
Shaniqua Solis attended Intensive Cardiac Rehab today from 1400 to 1600. During his time he exercised and attended class.   His education today was a NUTRITION WORKSHOP titled: LABEL READING. Patient received handouts and class discussion pertaining to the topic.

## 2025-03-25 ENCOUNTER — NON-PROVIDER VISIT (OUTPATIENT)
Dept: CARDIOLOGY | Facility: MEDICAL CENTER | Age: 80
End: 2025-03-25
Payer: MEDICARE

## 2025-03-25 DIAGNOSIS — Z95.5 STENTED CORONARY ARTERY: ICD-10-CM

## 2025-03-25 PROCEDURE — G0422 INTENS CARDIAC REHAB W/EXERC: HCPCS | Performed by: INTERNAL MEDICINE

## 2025-03-25 PROCEDURE — G0423 INTENS CARDIAC REHAB NO EXER: HCPCS | Mod: 59 | Performed by: INTERNAL MEDICINE

## 2025-03-25 NOTE — PROGRESS NOTES
Shaniqua Vaishali Solis attended Intensive Cardiac Rehab today from 1400 to 1600. During her time she exercised and attended class. Her education today was a VIDEO titled: BODY COMPOSITION.. Patient received handouts and class discussion pertaining to the topic.

## 2025-03-26 ENCOUNTER — NON-PROVIDER VISIT (OUTPATIENT)
Dept: CARDIOLOGY | Facility: MEDICAL CENTER | Age: 80
End: 2025-03-26
Payer: MEDICARE

## 2025-03-26 DIAGNOSIS — Z95.5 STENTED CORONARY ARTERY: ICD-10-CM

## 2025-03-26 PROCEDURE — G0422 INTENS CARDIAC REHAB W/EXERC: HCPCS | Performed by: INTERNAL MEDICINE

## 2025-03-26 PROCEDURE — G0423 INTENS CARDIAC REHAB NO EXER: HCPCS | Mod: 59 | Performed by: INTERNAL MEDICINE

## 2025-03-26 NOTE — PROGRESS NOTES
Shaniquamichelle Tom Will attended Intensive Cardiac Rehab today from 1400 to 1600. During his time he exercised and attended class.   His education today was a cooking school titled: Adding Flavor Sodium Free. Patient received handouts and class discussion pertaining to the topic.

## 2025-03-27 ENCOUNTER — NON-PROVIDER VISIT (OUTPATIENT)
Dept: CARDIOLOGY | Facility: MEDICAL CENTER | Age: 80
End: 2025-03-27
Payer: MEDICARE

## 2025-03-27 DIAGNOSIS — Z95.5 STENTED CORONARY ARTERY: ICD-10-CM

## 2025-03-27 PROCEDURE — G0422 INTENS CARDIAC REHAB W/EXERC: HCPCS | Performed by: INTERNAL MEDICINE

## 2025-03-27 PROCEDURE — G0423 INTENS CARDIAC REHAB NO EXER: HCPCS | Mod: 59 | Performed by: INTERNAL MEDICINE

## 2025-03-27 NOTE — PROGRESS NOTES
Shaniqua Solis attended Intensive Cardiac Rehab today from 1400 to 1600. During his time he exercised and attended class.   His education today was a EXERCISE WORKSHOP titled: EXERCISE BASICS-BUILDING YOUR ACTION PLAN. Patient received handouts and class discussion pertaining to the topic.

## 2025-04-01 ENCOUNTER — NON-PROVIDER VISIT (OUTPATIENT)
Dept: CARDIOLOGY | Facility: MEDICAL CENTER | Age: 80
End: 2025-04-01
Payer: MEDICARE

## 2025-04-01 DIAGNOSIS — Z95.5 STENTED CORONARY ARTERY: ICD-10-CM

## 2025-04-01 PROCEDURE — G0423 INTENS CARDIAC REHAB NO EXER: HCPCS | Mod: 59 | Performed by: INTERNAL MEDICINE

## 2025-04-01 PROCEDURE — G0422 INTENS CARDIAC REHAB W/EXERC: HCPCS | Performed by: INTERNAL MEDICINE

## 2025-04-01 NOTE — PROGRESS NOTES
Shaniqua Vaishali Solis attended Intensive Cardiac Rehab today from 1400 to 1600. During his time he exercised and attended class.   His education today was a VIDEO titled: LABEL READING. Patient received handouts and class discussion pertaining to the topic.

## 2025-04-02 ENCOUNTER — NON-PROVIDER VISIT (OUTPATIENT)
Dept: CARDIOLOGY | Facility: MEDICAL CENTER | Age: 80
End: 2025-04-02
Payer: MEDICARE

## 2025-04-02 DIAGNOSIS — Z95.5 STENTED CORONARY ARTERY: ICD-10-CM

## 2025-04-02 PROCEDURE — G0423 INTENS CARDIAC REHAB NO EXER: HCPCS | Mod: 59 | Performed by: INTERNAL MEDICINE

## 2025-04-02 PROCEDURE — G0422 INTENS CARDIAC REHAB W/EXERC: HCPCS | Performed by: INTERNAL MEDICINE

## 2025-04-02 NOTE — PROGRESS NOTES
Shaniqua Lagosczyk attended Intensive Cardiac Rehab today from 1400 to 1600. During her time she exercised and attended class. Her education today was a nutrition and cooking class titled: Healthy Breakfasts. Patient received handouts and class discussion pertaining to the topic.

## 2025-04-03 ENCOUNTER — NON-PROVIDER VISIT (OUTPATIENT)
Dept: CARDIOLOGY | Facility: MEDICAL CENTER | Age: 80
End: 2025-04-03
Payer: MEDICARE

## 2025-04-03 DIAGNOSIS — I25.84 CORONARY ARTERY DISEASE DUE TO CALCIFIED CORONARY LESION: ICD-10-CM

## 2025-04-03 DIAGNOSIS — Z95.5 STENTED CORONARY ARTERY: ICD-10-CM

## 2025-04-03 DIAGNOSIS — I25.10 CORONARY ARTERY DISEASE DUE TO CALCIFIED CORONARY LESION: ICD-10-CM

## 2025-04-03 PROCEDURE — G0422 INTENS CARDIAC REHAB W/EXERC: HCPCS | Performed by: INTERNAL MEDICINE

## 2025-04-03 PROCEDURE — G0423 INTENS CARDIAC REHAB NO EXER: HCPCS | Mod: 59 | Performed by: INTERNAL MEDICINE

## 2025-04-03 NOTE — PROGRESS NOTES
Shaniqua Tom Will attended Intensive Cardiac Rehab today from 1400 to 1600. During her time she exercised and attended class. Her education today was a WORKSHOP titled: ERASMO. Patient received handouts and class discussion pertaining to the topic.

## 2025-04-08 ENCOUNTER — NON-PROVIDER VISIT (OUTPATIENT)
Dept: CARDIOLOGY | Facility: MEDICAL CENTER | Age: 80
End: 2025-04-08
Payer: MEDICARE

## 2025-04-08 DIAGNOSIS — Z95.5 STENTED CORONARY ARTERY: ICD-10-CM

## 2025-04-08 PROCEDURE — G0422 INTENS CARDIAC REHAB W/EXERC: HCPCS | Performed by: INTERNAL MEDICINE

## 2025-04-08 PROCEDURE — G0423 INTENS CARDIAC REHAB NO EXER: HCPCS | Mod: 59 | Performed by: INTERNAL MEDICINE

## 2025-04-08 RX ORDER — CLOPIDOGREL BISULFATE 75 MG/1
75 TABLET ORAL DAILY
Qty: 100 TABLET | Refills: 3 | OUTPATIENT
Start: 2025-04-08

## 2025-04-08 RX ORDER — DILTIAZEM HYDROCHLORIDE 120 MG/1
120 CAPSULE, COATED, EXTENDED RELEASE ORAL DAILY
Qty: 100 CAPSULE | Refills: 3 | OUTPATIENT
Start: 2025-04-08

## 2025-04-08 NOTE — PROGRESS NOTES
Shaniquamichelle Tom Will attended Intensive Cardiac Rehab today from 1400 to 1600. During her time she exercised and attended class. Her education today was a workshop titled: Focused Goal Sustainable Changes. Patient received handouts and class discussion pertaining to the topic.

## 2025-04-09 ENCOUNTER — NON-PROVIDER VISIT (OUTPATIENT)
Dept: CARDIOLOGY | Facility: MEDICAL CENTER | Age: 80
End: 2025-04-09
Payer: MEDICARE

## 2025-04-09 DIAGNOSIS — Z95.5 STENTED CORONARY ARTERY: ICD-10-CM

## 2025-04-09 PROCEDURE — G0423 INTENS CARDIAC REHAB NO EXER: HCPCS | Mod: 59 | Performed by: INTERNAL MEDICINE

## 2025-04-09 PROCEDURE — G0422 INTENS CARDIAC REHAB W/EXERC: HCPCS | Performed by: INTERNAL MEDICINE

## 2025-04-10 ENCOUNTER — NON-PROVIDER VISIT (OUTPATIENT)
Dept: CARDIOLOGY | Facility: MEDICAL CENTER | Age: 80
End: 2025-04-10
Payer: MEDICARE

## 2025-04-10 DIAGNOSIS — Z95.5 STENTED CORONARY ARTERY: ICD-10-CM

## 2025-04-10 PROCEDURE — G0422 INTENS CARDIAC REHAB W/EXERC: HCPCS | Performed by: INTERNAL MEDICINE

## 2025-04-10 PROCEDURE — G0423 INTENS CARDIAC REHAB NO EXER: HCPCS | Mod: 59 | Performed by: INTERNAL MEDICINE

## 2025-04-10 NOTE — PROGRESS NOTES
Shaniqua Tom Will attended Intensive Cardiac Rehab today from 1400 to 1600. During her time she exercised and attended class. Her education today was a nutrition class titled: [employer] Plant-Based Proteins. Patient received handouts and class discussion pertaining to the topic.

## 2025-04-10 NOTE — PROGRESS NOTES
Shaniquamichelle Tom Will attended Intensive Cardiac Rehab today from 1400 to 1600. During her time she exercised and attended class. Her education today was a workshop titled: Fueling a Healthy Body. Patient received handouts and class discussion pertaining to the topic.

## 2025-04-15 ENCOUNTER — NON-PROVIDER VISIT (OUTPATIENT)
Dept: CARDIOLOGY | Facility: MEDICAL CENTER | Age: 80
End: 2025-04-15
Payer: MEDICARE

## 2025-04-15 DIAGNOSIS — Z95.5 STENTED CORONARY ARTERY: ICD-10-CM

## 2025-04-15 PROCEDURE — G0423 INTENS CARDIAC REHAB NO EXER: HCPCS | Mod: 59 | Performed by: INTERNAL MEDICINE

## 2025-04-15 PROCEDURE — G0422 INTENS CARDIAC REHAB W/EXERC: HCPCS | Performed by: INTERNAL MEDICINE

## 2025-04-15 NOTE — PROGRESS NOTES
Shaniqua Tom Will attended Intensive Cardiac Rehab today from 1400 to 1600. During her time she exercised and attended class. Her education today was a VIDEO titled: BIOMECHANICAL LIMITATIONS. Patient received handouts and class discussion pertaining to the topic.

## 2025-04-16 ENCOUNTER — NON-PROVIDER VISIT (OUTPATIENT)
Dept: CARDIOLOGY | Facility: MEDICAL CENTER | Age: 80
End: 2025-04-16
Payer: MEDICARE

## 2025-04-16 DIAGNOSIS — Z95.5 STENTED CORONARY ARTERY: ICD-10-CM

## 2025-04-16 PROCEDURE — G0423 INTENS CARDIAC REHAB NO EXER: HCPCS | Performed by: INTERNAL MEDICINE

## 2025-04-16 PROCEDURE — G0422 INTENS CARDIAC REHAB W/EXERC: HCPCS | Mod: 59 | Performed by: INTERNAL MEDICINE

## 2025-04-16 NOTE — NON-PROVIDER
Shaniqua Solis attended: cooking school from 2pm to 4pm  Today  prepared Simple Sides & Salads.    Patient received handouts and nutrition information regarding the specific recipes.

## 2025-04-17 ENCOUNTER — NON-PROVIDER VISIT (OUTPATIENT)
Dept: CARDIOLOGY | Facility: MEDICAL CENTER | Age: 80
End: 2025-04-17
Payer: MEDICARE

## 2025-04-17 DIAGNOSIS — Z95.5 STENTED CORONARY ARTERY: ICD-10-CM

## 2025-04-17 PROCEDURE — G0423 INTENS CARDIAC REHAB NO EXER: HCPCS | Mod: 59 | Performed by: INTERNAL MEDICINE

## 2025-04-17 PROCEDURE — G0422 INTENS CARDIAC REHAB W/EXERC: HCPCS | Performed by: INTERNAL MEDICINE

## 2025-04-17 NOTE — NON-PROVIDER
Shaniqua Vaishali Solis attended Intensive Cardiac Rehab today from 2pm to 4pm. During his time he exercised and attended class.   His education today was a video titled: Decoding Lab Results. Patient received handouts and class discussion pertaining to the topic.

## 2025-04-22 ENCOUNTER — NON-PROVIDER VISIT (OUTPATIENT)
Dept: CARDIOLOGY | Facility: MEDICAL CENTER | Age: 80
End: 2025-04-22
Payer: MEDICARE

## 2025-04-22 DIAGNOSIS — Z95.5 STENTED CORONARY ARTERY: ICD-10-CM

## 2025-04-22 PROCEDURE — G0423 INTENS CARDIAC REHAB NO EXER: HCPCS | Mod: 59 | Performed by: INTERNAL MEDICINE

## 2025-04-22 PROCEDURE — G0422 INTENS CARDIAC REHAB W/EXERC: HCPCS | Performed by: INTERNAL MEDICINE

## 2025-04-22 NOTE — PROGRESS NOTES
Shaniqua Vaishali Solis attended Intensive Cardiac Rehab today from 1400 to 1600. During her time she exercised and attended class. Her education today was a workshop titled: Exercise Biomechanics. Patient received handouts and class discussion pertaining to the topic.

## 2025-04-23 ENCOUNTER — NON-PROVIDER VISIT (OUTPATIENT)
Dept: CARDIOLOGY | Facility: MEDICAL CENTER | Age: 80
End: 2025-04-23
Payer: MEDICARE

## 2025-04-23 DIAGNOSIS — Z95.5 STENTED CORONARY ARTERY: ICD-10-CM

## 2025-04-23 PROCEDURE — G0423 INTENS CARDIAC REHAB NO EXER: HCPCS | Mod: 59 | Performed by: INTERNAL MEDICINE

## 2025-04-23 PROCEDURE — G0422 INTENS CARDIAC REHAB W/EXERC: HCPCS | Performed by: INTERNAL MEDICINE

## 2025-04-23 NOTE — PROGRESS NOTES
Shaniqua Tom Will attended Intensive Cardiac Rehab today from 1400 to 1600. During her time she exercised and attended class. Her education today was a cooking school titled: Simple Sides. Patient received handouts and class discussion pertaining to the topic.

## 2025-04-24 ENCOUNTER — NON-PROVIDER VISIT (OUTPATIENT)
Dept: CARDIOLOGY | Facility: MEDICAL CENTER | Age: 80
End: 2025-04-24
Payer: MEDICARE

## 2025-04-24 DIAGNOSIS — Z95.5 STENTED CORONARY ARTERY: ICD-10-CM

## 2025-04-24 PROCEDURE — G0423 INTENS CARDIAC REHAB NO EXER: HCPCS | Mod: 59 | Performed by: INTERNAL MEDICINE

## 2025-04-24 PROCEDURE — G0422 INTENS CARDIAC REHAB W/EXERC: HCPCS | Performed by: INTERNAL MEDICINE

## 2025-04-24 NOTE — PROGRESS NOTES
Shaniqua Tom Will attended Intensive Cardiac Rehab today from 1400 to 1600. During her time she exercised and attended class. Her education today was a workshop titled: Mindful Eating. Patient received handouts and class discussion pertaining to the topic.

## 2025-04-29 ENCOUNTER — NON-PROVIDER VISIT (OUTPATIENT)
Dept: CARDIOLOGY | Facility: MEDICAL CENTER | Age: 80
End: 2025-04-29
Payer: MEDICARE

## 2025-04-29 DIAGNOSIS — Z95.5 STENTED CORONARY ARTERY: ICD-10-CM

## 2025-04-29 NOTE — PROGRESS NOTES
Shaniqua Tom Will attended Intensive Cardiac Rehab today from 1400 to 1600. During her time she exercised and attended class. Her education today was a video titled: How Our Thoughts Can Heal Our Hearts. Patient received handouts and class discussion pertaining to the topic.

## 2025-04-30 ENCOUNTER — NON-PROVIDER VISIT (OUTPATIENT)
Dept: CARDIOLOGY | Facility: MEDICAL CENTER | Age: 80
End: 2025-04-30
Payer: MEDICARE

## 2025-04-30 DIAGNOSIS — Z95.5 STENTED CORONARY ARTERY: ICD-10-CM

## 2025-04-30 NOTE — PROGRESS NOTES
Shaniqua Vaishali Solis attended Intensive Cardiac Rehab today from 1400 to 1600. During his time he exercised and attended class.   His education today was a VIDEO titled: INTERNATIONAL CUISINE: SPOTLIGHT ON THE BLUE ZONES. Patient received handouts and class discussion pertaining to the topic.

## 2025-05-01 ENCOUNTER — APPOINTMENT (OUTPATIENT)
Dept: CARDIOLOGY | Facility: MEDICAL CENTER | Age: 80
End: 2025-05-01
Payer: MEDICARE

## 2025-05-01 DIAGNOSIS — Z95.5 STENTED CORONARY ARTERY: ICD-10-CM

## 2025-05-01 PROCEDURE — G0423 INTENS CARDIAC REHAB NO EXER: HCPCS | Mod: 59 | Performed by: INTERNAL MEDICINE

## 2025-05-01 PROCEDURE — G0422 INTENS CARDIAC REHAB W/EXERC: HCPCS | Performed by: INTERNAL MEDICINE

## 2025-05-02 NOTE — PROGRESS NOTES
Shaniquamichelle Tom Will attended Intensive Cardiac Rehab today from 1400 to 1600. During her time she exercised and attended class. Her education today was a workshop titled: Head to Heart: The Power of a Healthy [place]. Patient received handouts and class discussion pertaining to the topic.

## 2025-05-06 ENCOUNTER — NON-PROVIDER VISIT (OUTPATIENT)
Dept: CARDIOLOGY | Facility: MEDICAL CENTER | Age: 80
End: 2025-05-06
Payer: MEDICARE

## 2025-05-06 DIAGNOSIS — Z95.5 STENTED CORONARY ARTERY: ICD-10-CM

## 2025-05-06 PROCEDURE — G0423 INTENS CARDIAC REHAB NO EXER: HCPCS | Mod: 59 | Performed by: INTERNAL MEDICINE

## 2025-05-06 PROCEDURE — G0422 INTENS CARDIAC REHAB W/EXERC: HCPCS | Performed by: INTERNAL MEDICINE

## 2025-05-06 NOTE — PROGRESS NOTES
Shaniqua Tom Wlil attended Intensive Cardiac Rehab today from 1400 to 1600. During her time she exercised and attended class. Her education today was a video titled: Metabolic Syndrome and Belly Fat. Patient received handouts and class discussion pertaining to the topic.

## 2025-05-07 ENCOUNTER — NON-PROVIDER VISIT (OUTPATIENT)
Dept: CARDIOLOGY | Facility: MEDICAL CENTER | Age: 80
End: 2025-05-07
Payer: MEDICARE

## 2025-05-07 DIAGNOSIS — Z95.5 STENTED CORONARY ARTERY: ICD-10-CM

## 2025-05-07 PROCEDURE — G0422 INTENS CARDIAC REHAB W/EXERC: HCPCS | Performed by: INTERNAL MEDICINE

## 2025-05-07 PROCEDURE — G0423 INTENS CARDIAC REHAB NO EXER: HCPCS | Mod: 59 | Performed by: INTERNAL MEDICINE

## 2025-05-07 NOTE — PROGRESS NOTES
Shaniqua Solis attended Intensive Cardiac Rehab today from 1400 to 1600. During her time she exercised and attended class. Her education today was a cooking school titled: Delicious Desserts. Patient received handouts and class discussion pertaining to the topic.

## 2025-05-08 ENCOUNTER — NON-PROVIDER VISIT (OUTPATIENT)
Dept: CARDIOLOGY | Facility: MEDICAL CENTER | Age: 80
End: 2025-05-08
Payer: MEDICARE

## 2025-05-08 DIAGNOSIS — Z95.5 STENTED CORONARY ARTERY: ICD-10-CM

## 2025-05-08 PROCEDURE — G0422 INTENS CARDIAC REHAB W/EXERC: HCPCS | Performed by: INTERNAL MEDICINE

## 2025-05-08 PROCEDURE — G0423 INTENS CARDIAC REHAB NO EXER: HCPCS | Mod: 59 | Performed by: INTERNAL MEDICINE

## 2025-05-08 NOTE — PROGRESS NOTES
Shaniquamichelle Tom Will attended Intensive Cardiac Rehab today from 1600 to 1800. During her time she exercised and attended class. Her education today was a workshop titled: Balance and Fall Prevention. Patient received handouts and class discussion pertaining to the topic.

## 2025-05-25 ENCOUNTER — HOSPITAL ENCOUNTER (EMERGENCY)
Facility: MEDICAL CENTER | Age: 80
End: 2025-05-25
Attending: STUDENT IN AN ORGANIZED HEALTH CARE EDUCATION/TRAINING PROGRAM
Payer: MEDICARE

## 2025-05-25 ENCOUNTER — APPOINTMENT (OUTPATIENT)
Dept: RADIOLOGY | Facility: MEDICAL CENTER | Age: 80
End: 2025-05-25
Attending: STUDENT IN AN ORGANIZED HEALTH CARE EDUCATION/TRAINING PROGRAM
Payer: MEDICARE

## 2025-05-25 VITALS
BODY MASS INDEX: 24.54 KG/M2 | WEIGHT: 125 LBS | RESPIRATION RATE: 18 BRPM | TEMPERATURE: 99 F | HEIGHT: 60 IN | HEART RATE: 84 BPM | DIASTOLIC BLOOD PRESSURE: 78 MMHG | OXYGEN SATURATION: 99 % | SYSTOLIC BLOOD PRESSURE: 143 MMHG

## 2025-05-25 DIAGNOSIS — I47.10 PAROXYSMAL SVT (SUPRAVENTRICULAR TACHYCARDIA) (HCC): Primary | ICD-10-CM

## 2025-05-25 LAB
ALBUMIN SERPL BCP-MCNC: 3.9 G/DL (ref 3.2–4.9)
ALBUMIN/GLOB SERPL: 1.5 G/DL
ALP SERPL-CCNC: 78 U/L (ref 30–99)
ALT SERPL-CCNC: 16 U/L (ref 2–50)
ANION GAP SERPL CALC-SCNC: 12 MMOL/L (ref 7–16)
AST SERPL-CCNC: 19 U/L (ref 12–45)
BASOPHILS # BLD AUTO: 1 % (ref 0–1.8)
BASOPHILS # BLD: 0.09 K/UL (ref 0–0.12)
BILIRUB SERPL-MCNC: 0.2 MG/DL (ref 0.1–1.5)
BUN SERPL-MCNC: 10 MG/DL (ref 8–22)
CALCIUM ALBUM COR SERPL-MCNC: 9.6 MG/DL (ref 8.5–10.5)
CALCIUM SERPL-MCNC: 9.5 MG/DL (ref 8.5–10.5)
CHLORIDE SERPL-SCNC: 101 MMOL/L (ref 96–112)
CO2 SERPL-SCNC: 22 MMOL/L (ref 20–33)
CREAT SERPL-MCNC: 0.64 MG/DL (ref 0.5–1.4)
EKG IMPRESSION: NORMAL
EOSINOPHIL # BLD AUTO: 0.15 K/UL (ref 0–0.51)
EOSINOPHIL NFR BLD: 1.7 % (ref 0–6.9)
ERYTHROCYTE [DISTWIDTH] IN BLOOD BY AUTOMATED COUNT: 44.6 FL (ref 35.9–50)
GFR SERPLBLD CREATININE-BSD FMLA CKD-EPI: 89 ML/MIN/1.73 M 2
GLOBULIN SER CALC-MCNC: 2.6 G/DL (ref 1.9–3.5)
GLUCOSE SERPL-MCNC: 94 MG/DL (ref 65–99)
HCT VFR BLD AUTO: 39.3 % (ref 37–47)
HGB BLD-MCNC: 13 G/DL (ref 12–16)
IMM GRANULOCYTES # BLD AUTO: 0.03 K/UL (ref 0–0.11)
IMM GRANULOCYTES NFR BLD AUTO: 0.3 % (ref 0–0.9)
LYMPHOCYTES # BLD AUTO: 2.96 K/UL (ref 1–4.8)
LYMPHOCYTES NFR BLD: 33.9 % (ref 22–41)
MAGNESIUM SERPL-MCNC: 1.8 MG/DL (ref 1.5–2.5)
MCH RBC QN AUTO: 29.5 PG (ref 27–33)
MCHC RBC AUTO-ENTMCNC: 33.1 G/DL (ref 32.2–35.5)
MCV RBC AUTO: 89.3 FL (ref 81.4–97.8)
MONOCYTES # BLD AUTO: 0.8 K/UL (ref 0–0.85)
MONOCYTES NFR BLD AUTO: 9.2 % (ref 0–13.4)
NEUTROPHILS # BLD AUTO: 4.69 K/UL (ref 1.82–7.42)
NEUTROPHILS NFR BLD: 53.9 % (ref 44–72)
NRBC # BLD AUTO: 0 K/UL
NRBC BLD-RTO: 0 /100 WBC (ref 0–0.2)
NT-PROBNP SERPL IA-MCNC: 787 PG/ML (ref 0–125)
PLATELET # BLD AUTO: 310 K/UL (ref 164–446)
PMV BLD AUTO: 9.7 FL (ref 9–12.9)
POTASSIUM SERPL-SCNC: 4.2 MMOL/L (ref 3.6–5.5)
PROT SERPL-MCNC: 6.5 G/DL (ref 6–8.2)
RBC # BLD AUTO: 4.4 M/UL (ref 4.2–5.4)
SODIUM SERPL-SCNC: 135 MMOL/L (ref 135–145)
TROPONIN T SERPL-MCNC: 24 NG/L (ref 6–19)
WBC # BLD AUTO: 8.7 K/UL (ref 4.8–10.8)

## 2025-05-25 PROCEDURE — 93005 ELECTROCARDIOGRAM TRACING: CPT | Mod: TC | Performed by: STUDENT IN AN ORGANIZED HEALTH CARE EDUCATION/TRAINING PROGRAM

## 2025-05-25 PROCEDURE — 83735 ASSAY OF MAGNESIUM: CPT

## 2025-05-25 PROCEDURE — 85025 COMPLETE CBC W/AUTO DIFF WBC: CPT

## 2025-05-25 PROCEDURE — 80053 COMPREHEN METABOLIC PANEL: CPT

## 2025-05-25 PROCEDURE — 99284 EMERGENCY DEPT VISIT MOD MDM: CPT

## 2025-05-25 PROCEDURE — 84484 ASSAY OF TROPONIN QUANT: CPT

## 2025-05-25 PROCEDURE — 93005 ELECTROCARDIOGRAM TRACING: CPT | Mod: TC

## 2025-05-25 PROCEDURE — 83880 ASSAY OF NATRIURETIC PEPTIDE: CPT

## 2025-05-25 PROCEDURE — 36415 COLL VENOUS BLD VENIPUNCTURE: CPT

## 2025-05-25 ASSESSMENT — FIBROSIS 4 INDEX: FIB4 SCORE: 2.2

## 2025-05-26 NOTE — DISCHARGE INSTRUCTIONS
You may take a Lopressor (metoprolol) 25 mg tonight to manage palpitations until the Multaq begins to kick in.  I would recommend that you are careful with positional changes particularly from lying or sitting to standing and just go slowly to avoid any abrupt changes in your blood pressure that would cause dizziness

## 2025-05-26 NOTE — ED PROVIDER NOTES
ED Provider Note    CHIEF COMPLAINT  Chief Complaint   Patient presents with    Palpitations       EXTERNAL RECORDS REVIEWED  Outpatient Notes cardiology note 2025 patient placed on Multaq for paroxysmal A-fib and paroxysmal SVT    HPI/ROS  LIMITATION TO HISTORY   Select: : None  OUTSIDE HISTORIAN(S):  Significant other     Shaniqua Solis is a 79 y.o. female who presents for heart palpitations that began today.  Patient reports intermittent bouts of palpitations and feeling warmth in her face.  She denies chest pain or shortness of breath.    PAST MEDICAL HISTORY   has a past medical history of Anesthesia, Arrhythmia, CAD (coronary artery disease) (11/10/2010), Cardiac arrhythmia, Chickenpox, Crohns disease, Diarrhea, Dizziness (2010), GERD (gastroesophageal reflux disease), Heart burn, Heartburn, High cholesterol, Hypercholesteremia (11/10/2010), Indigestion, Influenza, Mumps, Murmur (11/10/2010), Osteoporosis, Palpitations (11/10/2010), Paroxysmal supraventricular tachycardia (HCC) (11/10/2010), Pneumonia, Shortness of breath, Tonsillitis, and Unspecified cataract.    SURGICAL HISTORY   has a past surgical history that includes primary c section (, ); inguinal hernia repair (Right, ); abdominoplasty (); cataract phaco with iol (Right, 2015); carpal tunnel release (); salpingostomy (Bilateral, ); knee arthroscopy (Left, 2017); meniscectomy, knee, medial (Left, 2017); and arthroscopy, knee.    FAMILY HISTORY  Family History   Problem Relation Age of Onset    Cancer Mother         Breast    Heart Attack Father          at age 83 of heart attack.    Heart Attack Brother         heart attack at age 41 and bypass surgery    Heart Attack Brother         bypass surgery at age 62    Cancer Maternal Aunt         Breast       SOCIAL HISTORY  Social History     Tobacco Use    Smoking status: Never    Smokeless tobacco: Never   Vaping Use    Vaping status: Never Used    Substance and Sexual Activity    Alcohol use: Not Currently     Comment: Rarely    Drug use: Never    Sexual activity: Not on file       CURRENT MEDICATIONS  Home Medications       Reviewed by Laura Gaytan R.N. (Registered Nurse) on 05/25/25 at 1806  Med List Status: Not Addressed     Medication Last Dose Status   aspirin 81 MG tablet  Active   atorvastatin (LIPITOR) 80 MG tablet  Active   benzonatate (TESSALON) 100 MG Cap  Active   clopidogrel (PLAVIX) 75 MG Tab  Active   DILTIAZem CD (CARDIZEM CD) 120 MG CAPSULE SR 24 HR  Active   dronedarone (MULTAQ) 400 MG Tab  Active   estradiol (ESTRACE) 0.1 MG/GM vaginal cream  Active   metoprolol tartrate (LOPRESSOR) 25 MG Tab  Active   pantoprazole (PROTONIX) 40 MG Tablet Delayed Response  Active                    ALLERGIES  Allergies[1]    PHYSICAL EXAM  VITAL SIGNS: BP (!) 143/78   Pulse 84   Temp 37.2 °C (99 °F) (Temporal)   Resp 18   Ht 1.524 m (5')   Wt 56.7 kg (125 lb)   SpO2 99%   BMI 24.41 kg/m²    Physical Exam  Vitals and nursing note reviewed.   Constitutional:       Appearance: She is well-developed. She is not ill-appearing or toxic-appearing.   HENT:      Head: Normocephalic.   Eyes:      Extraocular Movements: Extraocular movements intact.      Pupils: Pupils are equal, round, and reactive to light.   Cardiovascular:      Rate and Rhythm: Regular rhythm. Tachycardia present.   Pulmonary:      Effort: Pulmonary effort is normal.      Breath sounds: Normal breath sounds. No wheezing, rhonchi or rales.   Abdominal:      Palpations: Abdomen is soft.      Tenderness: There is no abdominal tenderness.   Musculoskeletal:      Cervical back: Normal range of motion.   Neurological:      Mental Status: She is alert and oriented to person, place, and time.         EKG/LABS  Labs Reviewed   PROBRAIN NATRIURETIC PEPTIDE, NT - Abnormal; Notable for the following components:       Result Value    NT-proBNP 787 (*)     All other components within normal  limits   TROPONIN - Abnormal; Notable for the following components:    Troponin T 24 (*)     All other components within normal limits   CBC WITH DIFFERENTIAL   COMP METABOLIC PANEL   MAGNESIUM   ESTIMATED GFR     Results for orders placed or performed during the hospital encounter of 25   EKG   Result Value Ref Range    Report       [facility]    Test Date:  2025  Pt Name:    NADEGE SMITH               Department: EDS  MRN:        [id number]                      Room:  Gender:     Female                       Technician: [id number]  :        1945                   Requested By:ER TRIAGE PROTOCOL  Order #:    [id number]                    Reading MD: Christiano Orteag    Measurements  Intervals                                Axis  Rate:       105                          P:          0  WI:         0                            QRS:        -38  QRSD:       85                           T:          39  QT:         335  QTc:        443    Interpretive Statements  sinus rhythm that converts to SVT  Left axis deviation  Compared to ECG 2024 17:12:45  Ventricular tachycardia now present  Sinus rhythm no longer present  ST (T wave) deviation no longer present  Electronically Signed On 2025 18:38:59 PDT by Christiano Ortega         I have independently interpreted this EKG    COURSE & MEDICAL DECISION MAKING    ASSESSMENT, COURSE AND PLAN  Care Narrative: 79-year-old female presenting for intermittent bouts of palpitations she has a history of paroxysmal A-fib and paroxysmal SVT for which she is supposed to be taking Multaq.  She reports over the past 4 days she has not taken this medication at all and before that she was really only taking it once a day as the label on the bottle said to take it with meals and she really only eats about 1 meal daily and otherwise snacks throughout the day.  She does not feel comfortable taking it with a snack and disobeying the  instructions on the bottle.  On exam EKG here she has noted to be in sinus rhythm and then go into SVT.  Watching her on the telemetry she does the same and intermittently goes back and forth typically having about 20 beats of SVT prior to self converting back to sinus rhythm for about 2 to 3 minutes before going in SVT again.  It was never sustained long enough to intervene with IV meds.  I recommended giving the patient a dose of her Multaq or metoprolol here but she has opted to take this when she gets home tonight.  I discussed the case with our pharmacist who advised that it is okay for her to take the pill when she is not having a full meal and just needs a small snack and so the patient is in agreement to be compliant with twice daily dosing for this medication now.  The patient additionally has metoprolol at home that she can take tonight for additional symptom relief.  Workup is overall reassuring with a very mild increase in troponins however with SVT this is certainly a possibility and she is denying any chest pain at this time so I am still comfortable with the plan of discharge home.  The patient was given strict return precautions for any development of chest pain, shortness of breath, dizziness.              ADDITIONAL PROBLEMS MANAGED  Past Medical History[2]    DISPOSITION AND DISCUSSIONS  I have discussed management of the patient with the following physicians and HEBER's:      Discussion of management with other QHP or appropriate source(s): Pharmacy    Escalation of care considered, and ultimately not performed:    Barriers to care at this time, including but not limited to: .     Decision tools and prescription drugs considered including, but not limited to: Multaq.    FINAL DIAGNOSIS  1. Paroxysmal SVT (supraventricular tachycardia) (HCC)         Electronically signed by: Christiano Ortega M.D., 5/25/2025 10:47 PM           [1] No Known Allergies  [2]   Past Medical History:  Diagnosis Date     "Anesthesia     \"couldn't open eye or talk\"    Arrhythmia     PSVT    CAD (coronary artery disease) 11/10/2010    Cardiac arrhythmia     Chickenpox     Crohns disease     Diarrhea     Dizziness 2/2/2010    GERD (gastroesophageal reflux disease)     Heart burn     Heartburn     High cholesterol     Hypercholesteremia 11/10/2010    Indigestion     Influenza     Mumps     Murmur 11/10/2010    Osteoporosis     Palpitations 11/10/2010    Paroxysmal supraventricular tachycardia (HCC) 11/10/2010    Pneumonia     Shortness of breath     Tonsillitis     Unspecified cataract     IOL OD     "

## 2025-05-26 NOTE — ED TRIAGE NOTES
Pt presents with  from home for palpitations, feeling flushed, and lightheaded intermittently that began today around 1200 while at an indoor event. Denies cheat pain, dyspnea, nausea or emesis. Pt A&O4 and ambulatory.     Hx of MI - 3 stents. Dr Patton is her cardiologist.

## 2025-05-26 NOTE — ED NOTES
Dr Ortega presents to the bedside to engage in kiara conversation regarding the patient's reason for presenting to the ED and POC.

## 2025-05-26 NOTE — ED NOTES
Brought back to rm 8b with spouse. PIV placed. Blood to lab.     NSR with pSVT vs pAF on monitor.

## 2025-06-26 ENCOUNTER — PHARMACY VISIT (OUTPATIENT)
Dept: PHARMACY | Facility: MEDICAL CENTER | Age: 80
End: 2025-06-26
Payer: COMMERCIAL

## 2025-06-26 DIAGNOSIS — I25.10 CORONARY ARTERY DISEASE DUE TO CALCIFIED CORONARY LESION: ICD-10-CM

## 2025-06-26 DIAGNOSIS — I25.84 CORONARY ARTERY DISEASE DUE TO CALCIFIED CORONARY LESION: ICD-10-CM

## 2025-06-26 PROCEDURE — RXMED WILLOW AMBULATORY MEDICATION CHARGE: Performed by: INTERNAL MEDICINE

## 2025-06-26 RX ORDER — ATORVASTATIN CALCIUM 80 MG/1
80 TABLET, FILM COATED ORAL EVERY EVENING
Qty: 100 TABLET | Refills: 2 | Status: SHIPPED | OUTPATIENT
Start: 2025-06-26

## 2025-06-26 NOTE — PROGRESS NOTES
"[facility]    Pt has seen a [facility] provider in past 12 months and in need of prescription refills per protocol.  A 100 day supply is provided whenever possible to improve adherence rates.     Lab Results   Component Value Date/Time    HBA1C 5.7 (H) 11/26/2024 12:57 AM      No results found for: \"MICROALBCALC\", \"MALBCRT\", \"MALBEXCR\", \"NBMJWH83\", \"MICROALBUR\", \"MICRALB\", \"UMICROALBUM\", \"MICROALBTIM\"   Lab Results   Component Value Date/Time    ALKPHOSPHAT 78 05/25/2025 06:28 PM    ASTSGOT 19 05/25/2025 06:28 PM    ALTSGPT 16 05/25/2025 06:28 PM    TBILIRUBIN 0.2 05/25/2025 06:28 PM      Lab Results   Component Value Date/Time    SODIUM 135 05/25/2025 06:28 PM    POTASSIUM 4.2 05/25/2025 06:28 PM    CHLORIDE 101 05/25/2025 06:28 PM    CO2 22 05/25/2025 06:28 PM    GLUCOSE 94 05/25/2025 06:28 PM    BUN 10 05/25/2025 06:28 PM    CREATININE 0.64 05/25/2025 06:28 PM        Meds refilled:  Atorvastatin    Pt agrees to automatic mail delivery    Thanh Butsos, PharmD     CC  Dr. Patton      "

## 2025-06-29 SDOH — HEALTH STABILITY: PHYSICAL HEALTH: ON AVERAGE, HOW MANY DAYS PER WEEK DO YOU ENGAGE IN MODERATE TO STRENUOUS EXERCISE (LIKE A BRISK WALK)?: 1 DAY

## 2025-06-29 SDOH — ECONOMIC STABILITY: HOUSING INSECURITY
IN THE LAST 12 MONTHS, WAS THERE A TIME WHEN YOU DID NOT HAVE A STEADY PLACE TO SLEEP OR SLEPT IN A SHELTER (INCLUDING NOW)?: NO

## 2025-06-29 SDOH — ECONOMIC STABILITY: INCOME INSECURITY: IN THE LAST 12 MONTHS, WAS THERE A TIME WHEN YOU WERE NOT ABLE TO PAY THE MORTGAGE OR RENT ON TIME?: NO

## 2025-06-29 SDOH — ECONOMIC STABILITY: TRANSPORTATION INSECURITY
IN THE PAST 12 MONTHS, HAS LACK OF RELIABLE TRANSPORTATION KEPT YOU FROM MEDICAL APPOINTMENTS, MEETINGS, WORK OR FROM GETTING THINGS NEEDED FOR DAILY LIVING?: NO

## 2025-06-29 SDOH — HEALTH STABILITY: PHYSICAL HEALTH: ON AVERAGE, HOW MANY MINUTES DO YOU ENGAGE IN EXERCISE AT THIS LEVEL?: 20 MIN

## 2025-06-29 SDOH — ECONOMIC STABILITY: FOOD INSECURITY: WITHIN THE PAST 12 MONTHS, THE FOOD YOU BOUGHT JUST DIDN'T LAST AND YOU DIDN'T HAVE MONEY TO GET MORE.: NEVER TRUE

## 2025-06-29 SDOH — ECONOMIC STABILITY: TRANSPORTATION INSECURITY
IN THE PAST 12 MONTHS, HAS THE LACK OF TRANSPORTATION KEPT YOU FROM MEDICAL APPOINTMENTS OR FROM GETTING MEDICATIONS?: NO

## 2025-06-29 SDOH — ECONOMIC STABILITY: INCOME INSECURITY: HOW HARD IS IT FOR YOU TO PAY FOR THE VERY BASICS LIKE FOOD, HOUSING, MEDICAL CARE, AND HEATING?: NOT HARD AT ALL

## 2025-06-29 SDOH — HEALTH STABILITY: MENTAL HEALTH
STRESS IS WHEN SOMEONE FEELS TENSE, NERVOUS, ANXIOUS, OR CAN'T SLEEP AT NIGHT BECAUSE THEIR MIND IS TROUBLED. HOW STRESSED ARE YOU?: NOT AT ALL

## 2025-06-29 SDOH — ECONOMIC STABILITY: FOOD INSECURITY: WITHIN THE PAST 12 MONTHS, YOU WORRIED THAT YOUR FOOD WOULD RUN OUT BEFORE YOU GOT MONEY TO BUY MORE.: NEVER TRUE

## 2025-06-29 SDOH — ECONOMIC STABILITY: TRANSPORTATION INSECURITY
IN THE PAST 12 MONTHS, HAS LACK OF TRANSPORTATION KEPT YOU FROM MEETINGS, WORK, OR FROM GETTING THINGS NEEDED FOR DAILY LIVING?: NO

## 2025-06-29 ASSESSMENT — SOCIAL DETERMINANTS OF HEALTH (SDOH)
DO YOU BELONG TO ANY CLUBS OR ORGANIZATIONS SUCH AS CHURCH GROUPS UNIONS, FRATERNAL OR ATHLETIC GROUPS, OR SCHOOL GROUPS?: YES
HOW OFTEN DO YOU HAVE SIX OR MORE DRINKS ON ONE OCCASION: NEVER
IN A TYPICAL WEEK, HOW MANY TIMES DO YOU TALK ON THE PHONE WITH FAMILY, FRIENDS, OR NEIGHBORS?: MORE THAN THREE TIMES A WEEK
HOW OFTEN DO YOU ATTEND CHURCH OR RELIGIOUS SERVICES?: NEVER
IN A TYPICAL WEEK, HOW MANY TIMES DO YOU TALK ON THE PHONE WITH FAMILY, FRIENDS, OR NEIGHBORS?: MORE THAN THREE TIMES A WEEK
WITHIN THE PAST 12 MONTHS, YOU WORRIED THAT YOUR FOOD WOULD RUN OUT BEFORE YOU GOT THE MONEY TO BUY MORE: NEVER TRUE
IN THE PAST 12 MONTHS, HAS THE ELECTRIC, GAS, OIL, OR WATER COMPANY THREATENED TO SHUT OFF SERVICE IN YOUR HOME?: NO
HOW OFTEN DO YOU HAVE A DRINK CONTAINING ALCOHOL: 2-4 TIMES A MONTH
HOW OFTEN DO YOU ATTEND CHURCH OR RELIGIOUS SERVICES?: NEVER
HOW OFTEN DO YOU ATTENT MEETINGS OF THE CLUB OR ORGANIZATION YOU BELONG TO?: MORE THAN 4 TIMES PER YEAR
HOW OFTEN DO YOU ATTENT MEETINGS OF THE CLUB OR ORGANIZATION YOU BELONG TO?: MORE THAN 4 TIMES PER YEAR
HOW MANY DRINKS CONTAINING ALCOHOL DO YOU HAVE ON A TYPICAL DAY WHEN YOU ARE DRINKING: 1 OR 2
HOW HARD IS IT FOR YOU TO PAY FOR THE VERY BASICS LIKE FOOD, HOUSING, MEDICAL CARE, AND HEATING?: NOT HARD AT ALL
HOW OFTEN DO YOU GET TOGETHER WITH FRIENDS OR RELATIVES?: MORE THAN THREE TIMES A WEEK
HOW OFTEN DO YOU GET TOGETHER WITH FRIENDS OR RELATIVES?: MORE THAN THREE TIMES A WEEK
DO YOU BELONG TO ANY CLUBS OR ORGANIZATIONS SUCH AS CHURCH GROUPS UNIONS, FRATERNAL OR ATHLETIC GROUPS, OR SCHOOL GROUPS?: YES

## 2025-06-29 ASSESSMENT — LIFESTYLE VARIABLES
HOW OFTEN DO YOU HAVE SIX OR MORE DRINKS ON ONE OCCASION: NEVER
AUDIT-C TOTAL SCORE: 2
HOW OFTEN DO YOU HAVE A DRINK CONTAINING ALCOHOL: 2-4 TIMES A MONTH
SKIP TO QUESTIONS 9-10: 1
HOW MANY STANDARD DRINKS CONTAINING ALCOHOL DO YOU HAVE ON A TYPICAL DAY: 1 OR 2

## 2025-07-02 ENCOUNTER — OFFICE VISIT (OUTPATIENT)
Dept: CARDIOLOGY | Facility: MEDICAL CENTER | Age: 80
End: 2025-07-02
Payer: MEDICARE

## 2025-07-02 ENCOUNTER — OFFICE VISIT (OUTPATIENT)
Dept: MEDICAL GROUP | Facility: MEDICAL CENTER | Age: 80
End: 2025-07-02
Payer: MEDICARE

## 2025-07-02 VITALS
SYSTOLIC BLOOD PRESSURE: 120 MMHG | OXYGEN SATURATION: 97 % | TEMPERATURE: 96.5 F | BODY MASS INDEX: 24.29 KG/M2 | DIASTOLIC BLOOD PRESSURE: 70 MMHG | HEART RATE: 69 BPM | WEIGHT: 123.7 LBS | HEIGHT: 60 IN

## 2025-07-02 VITALS
BODY MASS INDEX: 24.15 KG/M2 | OXYGEN SATURATION: 98 % | WEIGHT: 123 LBS | RESPIRATION RATE: 18 BRPM | SYSTOLIC BLOOD PRESSURE: 124 MMHG | DIASTOLIC BLOOD PRESSURE: 68 MMHG | HEIGHT: 60 IN | HEART RATE: 72 BPM

## 2025-07-02 DIAGNOSIS — I35.0 MILD AORTIC STENOSIS: ICD-10-CM

## 2025-07-02 DIAGNOSIS — Z95.5 PRESENCE OF DRUG COATED STENT IN CORONARY ARTERY: Primary | ICD-10-CM

## 2025-07-02 DIAGNOSIS — E55.9 VITAMIN D DEFICIENCY: ICD-10-CM

## 2025-07-02 DIAGNOSIS — I25.83 CORONARY ARTERY DISEASE DUE TO LIPID RICH PLAQUE: Primary | Chronic | ICD-10-CM

## 2025-07-02 DIAGNOSIS — K21.9 GASTROESOPHAGEAL REFLUX DISEASE WITHOUT ESOPHAGITIS: ICD-10-CM

## 2025-07-02 DIAGNOSIS — I25.84 CORONARY ARTERY DISEASE DUE TO CALCIFIED CORONARY LESION: ICD-10-CM

## 2025-07-02 DIAGNOSIS — I25.10 CORONARY ARTERY DISEASE DUE TO LIPID RICH PLAQUE: Primary | Chronic | ICD-10-CM

## 2025-07-02 DIAGNOSIS — E78.5 DYSLIPIDEMIA: ICD-10-CM

## 2025-07-02 DIAGNOSIS — M41.20 OTHER IDIOPATHIC SCOLIOSIS, UNSPECIFIED SPINAL REGION: ICD-10-CM

## 2025-07-02 DIAGNOSIS — E78.5 DYSLIPIDEMIA: Chronic | ICD-10-CM

## 2025-07-02 DIAGNOSIS — I47.10 PAROXYSMAL SUPRAVENTRICULAR TACHYCARDIA (HCC): ICD-10-CM

## 2025-07-02 DIAGNOSIS — I25.10 CORONARY ARTERY DISEASE DUE TO CALCIFIED CORONARY LESION: ICD-10-CM

## 2025-07-02 DIAGNOSIS — R73.03 PREDIABETES: ICD-10-CM

## 2025-07-02 DIAGNOSIS — I51.7 LEFT VENTRICULAR HYPERTROPHY: ICD-10-CM

## 2025-07-02 DIAGNOSIS — I35.1 MILD AORTIC REGURGITATION: ICD-10-CM

## 2025-07-02 DIAGNOSIS — I47.10 PAROXYSMAL SUPRAVENTRICULAR TACHYCARDIA (HCC): Chronic | ICD-10-CM

## 2025-07-02 DIAGNOSIS — I47.19 PAT (PAROXYSMAL ATRIAL TACHYCARDIA) (HCC): ICD-10-CM

## 2025-07-02 DIAGNOSIS — I70.0 ATHEROSCLEROSIS OF ABDOMINAL AORTA (HCC): ICD-10-CM

## 2025-07-02 DIAGNOSIS — M85.80 OSTEOPENIA, UNSPECIFIED LOCATION: ICD-10-CM

## 2025-07-02 PROBLEM — R94.39 ABNORMAL CARDIOVASCULAR STRESS TEST: Status: RESOLVED | Noted: 2024-11-21 | Resolved: 2025-07-02

## 2025-07-02 PROBLEM — S83.232A COMPLEX TEAR OF MEDIAL MENISCUS OF LEFT KNEE AS CURRENT INJURY: Status: RESOLVED | Noted: 2017-07-06 | Resolved: 2025-07-02

## 2025-07-02 PROCEDURE — 99214 OFFICE O/P EST MOD 30 MIN: CPT | Performed by: STUDENT IN AN ORGANIZED HEALTH CARE EDUCATION/TRAINING PROGRAM

## 2025-07-02 PROCEDURE — 3074F SYST BP LT 130 MM HG: CPT

## 2025-07-02 PROCEDURE — 99214 OFFICE O/P EST MOD 30 MIN: CPT

## 2025-07-02 PROCEDURE — 3078F DIAST BP <80 MM HG: CPT

## 2025-07-02 PROCEDURE — 3078F DIAST BP <80 MM HG: CPT | Performed by: STUDENT IN AN ORGANIZED HEALTH CARE EDUCATION/TRAINING PROGRAM

## 2025-07-02 PROCEDURE — 3074F SYST BP LT 130 MM HG: CPT | Performed by: STUDENT IN AN ORGANIZED HEALTH CARE EDUCATION/TRAINING PROGRAM

## 2025-07-02 PROCEDURE — 99213 OFFICE O/P EST LOW 20 MIN: CPT

## 2025-07-02 ASSESSMENT — ENCOUNTER SYMPTOMS
PALPITATIONS: 0
SYNCOPE: 0
DYSPNEA ON EXERTION: 0
HEADACHES: 0
LIGHT-HEADEDNESS: 0
SHORTNESS OF BREATH: 0
PND: 0
NEAR-SYNCOPE: 0
DIZZINESS: 0
ORTHOPNEA: 0

## 2025-07-02 ASSESSMENT — PATIENT HEALTH QUESTIONNAIRE - PHQ9: CLINICAL INTERPRETATION OF PHQ2 SCORE: 0

## 2025-07-02 ASSESSMENT — FIBROSIS 4 INDEX
FIB4 SCORE: 1.21
FIB4 SCORE: 1.21

## 2025-07-02 NOTE — PROGRESS NOTES
Subjective:     CC:   Chief Complaint   Patient presents with    [facility] [place] [facility]       HPI:   Shaniqua presents today with  Verbal consent was acquired by the patient to use [employer] ambient listening note generation during this visit Yes   History of Present Illness  The patient presents for evaluation of osteopenia, hiatal hernia, and lumbar levoscoliosis.    The primary reason for this visit includes concerns about her osteopenia and a growing cyst near her spine. She is under the care of Dr. Patton, a cardiologist, and is currently on aspirin, atorvastatin 80 mg, and Plavix. In 11/2024, she had three stents placed. She reports no presence of blood in her stools or any excessive bruising. She takes Cardizem as needed for rapid heartbeat and does not take metoprolol.    She has a history of hiatal hernia and severe acid reflux, for which she takes pantoprazole in the morning. Her last cholesterol check was in 11/2024, and her A1c was 5.7% in 11/2024. She does not take any vitamins, including vitamin D and calcium, despite having osteopenia. She used to take Fosamax but discontinued it a few years ago and had received infusions prior to starting Fosamax.    She has 45-degree scoliosis and a small cyst near her spine. She is scheduled to see Dr. Ryder Brooke on 07/22/2025 for a laminotomy and is concerned about the growing cyst near her spine.    PAST SURGICAL HISTORY:  - Meniscus surgery  - Stent placement (11/2024)    Results  Labs   - Cholesterol levels: 11/2024, Normal   - A1c: 11/2024, 5.7    Imaging   - DEXA scan: 03/2025, Osteopenia with more bone loss in the hip, indicating a 4.5% fracture risk     Health Maintenance: Completed    ROS:  ROS    Review of systems unremarkable except for concerns noted by patient or items listed.    Please see HPI for additional ROS.      Objective:     Exam:  /70 (BP Location: Left arm, Patient Position: Sitting, BP Cuff Size: Adult)   Pulse 69   Temp 35.8 °C (96.5  °F) (Temporal)   Ht 1.524 m (5')   Wt 56.1 kg (123 lb 11.2 oz)   SpO2 97%   BMI 24.16 kg/m²  Body mass index is 24.16 kg/m².    Physical Exam  Constitutional:       Appearance: Normal appearance.   HENT:      Head: Normocephalic.   Eyes:      General: No scleral icterus.  Cardiovascular:      Rate and Rhythm: Normal rate and regular rhythm.      Pulses: Normal pulses.      Heart sounds: Murmur heard.   Pulmonary:      Effort: Pulmonary effort is normal.      Breath sounds: Normal breath sounds.   Musculoskeletal:      Right lower leg: No edema.      Left lower leg: No edema.   Skin:     General: Skin is warm.   Neurological:      Mental Status: She is alert and oriented to person, place, and time.   Psychiatric:         Mood and Affect: Mood normal.         Behavior: Behavior normal.             Labs: reviewed    Assessment & Plan:     79 y.o. female with the following -     1. Coronary artery disease due to lipid rich plaque - LAD and LCx PCI (Primary)  Chronic,stable  CAD with Status post complex PCI with stent placement to the LAD, diagonal, circumflex in 11/25/24  Currently on aspirin and plavix along with atorvastatin 80 mg daily   No blood in stools or excessive bruising   Plan:  Continue current medications , continue DAPT  Continue f/u with cardiology     2. Dyslipidemia  Chronic, stable   Lab Results   Component Value Date/Time    CHOLSTRLTOT 120 11/26/2024 0057    TRIGLYCERIDE 79 11/26/2024 0057    HDL 41 11/26/2024 0057    LDL 63 11/26/2024 0057     Plan:  Continue atorvastatin 80 mg daily   Recommended to continue low fat healthy diet and regular exercise     3. Paroxysmal supraventricular tachycardia (HCC)  Chronic,stable  Current medications : Dronedarone 400 mg BID and cardizem 120 mg   Continue f/u with cardiology     4. Prediabetes  Chronic,stable  Last A1C 5.7% in 11/24  Diet and exercise managed     5. Osteopenia, unspecified location  Chronic,stable   recommended the following:  - 1200 mg of  calcium daily through diet and/or supplementation (calcium citrate)  - 2000 to 4000 international units of vitamin D daily  - doing exercises that build muscle tone  - VITAMIN D,25 HYDROXY (DEFICIENCY); Future    6. Vitamin D deficiency  - VITAMIN D,25 HYDROXY (DEFICIENCY); Future    7. Gastroesophageal reflux disease without esophagitis  Chronic condition,stable  Hiatal hernia present   No history of gastric ulcers  Symptoms well controlled with pantoprazole 40 mg daily   Denies:   - heartburn, epigastric pain.   - nausea, vomiting, or diarrhea  - dysphagia  - abnormal cough  - blood in the stool or dark tarry stools.  - early satiety  - tobacco use.  Plan:  Continue current treatment     8. Other idiopathic scoliosis, unspecified spinal region  Lumbar levoscoliosis present   Going to follow up with [facility]           Please note that this dictation was created using voice recognition software. I have made every reasonable attempt to correct obvious errors, but I expect that there are errors of grammar and possibly content that I did not discover before finalizing the note.

## 2025-07-02 NOTE — PROGRESS NOTES
Chief Complaint   Patient presents with    Coronary Artery Disease     Follow up          Subjective:   Shaniqua Solis is a 79 y.o. female who presents today for follow-up.     Patient of Dr. Patton.  Current medical problems include CAD s/p PCI, PSVT, PAT, mild aortig regurgitation and stenosis, LVH, HLD. Their last clinic visit was 1/9/2025 with Dr. Patton.    She was recently seen in the ED on 5/25/2025 for palpitations. She had not been taking her Multaq for 4 days. She was found to have episodes of her SVT. She took her dose of medication and was discharged home with out patient follow up.     Today's visit:  Patient reports that since her ED visit she has not missed any doses of the Multaq and she has not had any palpitations or SVT. She states she knew the medication had to be taken with a meal and sometimes would get busy and just have a small snack so not take her medications. She knows now she can take it with a snack and is ensuring she is compliant. She finished cardiac rehab and is feeling great. She has no chest pain, shortness of breath, edema, orthopnea, PND, dizziness/lightheadedness, or palpitations. 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    Cardiovascular Risk Factors:  1. Smoking status: Never  2. Type II Diabetes Mellitus: No Pre diabetic  Lab Results   Component Value Date/Time    HBA1C 5.7 (H) 11/26/2024 12:57 AM    HBA1C 6.0 (H) 04/15/2024 08:42 AM     3. Hypertension: Yes  4. Dyslipidemia: Yes   Cholesterol,Tot   Date Value Ref Range Status   11/26/2024 120 100 - 199 mg/dL Final     LDL   Date Value Ref Range Status   11/26/2024 63 <100 mg/dL Final     HDL   Date Value Ref Range Status   11/26/2024 41 >=40 mg/dL Final     Triglycerides   Date Value Ref Range Status   11/26/2024 79 0 - 149 mg/dL Final         Past Medical History[1]      Family History    Problem Relation Age of Onset    Cancer Mother         Breast    Heart Attack Father          at age 83 of heart attack.    Heart Attack Brother         heart attack at age 41 and bypass surgery    Heart Attack Brother         bypass surgery at age 62    Cancer Maternal Aunt         Breast         Social History[2]      Allergies[3]      Current Outpatient Medications   Medication Sig    atorvastatin (LIPITOR) 80 MG tablet Take 1 Tablet by mouth every evening.    dronedarone (MULTAQ) 400 MG Tab Take 1 Tablet by mouth 2 times a day with meals.    clopidogrel (PLAVIX) 75 MG Tab Take 1 Tablet by mouth every day.    DILTIAZem CD (CARDIZEM CD) 120 MG CAPSULE SR 24 HR Take 1 Capsule by mouth every day.    pantoprazole (PROTONIX) 40 MG Tablet Delayed Response Take 1 Tablet by mouth every day.    estradiol (ESTRACE) 0.1 MG/GM vaginal cream     aspirin 81 MG tablet Take 81 mg by mouth every evening.         Review of Systems   Constitutional: Negative for malaise/fatigue.   Cardiovascular:  Negative for chest pain, dyspnea on exertion, leg swelling, near-syncope, orthopnea, palpitations, paroxysmal nocturnal dyspnea and syncope.   Respiratory:  Negative for shortness of breath.    Neurological:  Negative for dizziness, headaches and light-headedness.           Objective:   /68 (BP Location: Left arm, Patient Position: Sitting)   Pulse 72   Resp 18   Ht 1.524 m (5')   Wt 55.8 kg (123 lb)   SpO2 98%  Body mass index is 24.02 kg/m².         Physical Exam  Vitals reviewed.   Constitutional:       General: She is not in acute distress.     Appearance: Normal appearance.   HENT:      Head: Normocephalic and atraumatic.   Cardiovascular:      Rate and Rhythm: Normal rate and regular rhythm.      Pulses: Normal pulses.      Heart sounds: Murmur heard.   Pulmonary:      Effort: Pulmonary effort is normal. No respiratory distress.      Breath sounds: Normal breath sounds.   Musculoskeletal:      Right lower leg: No  edema.      Left lower leg: No edema.   Neurological:      Mental Status: She is alert and oriented to person, place, and time.      Gait: Gait normal.   Psychiatric:         Behavior: Behavior normal.             Lab Results   Component Value Date/Time    SODIUM 135 05/25/2025 06:28 PM    POTASSIUM 4.2 05/25/2025 06:28 PM    CHLORIDE 101 05/25/2025 06:28 PM    CO2 22 05/25/2025 06:28 PM    GLUCOSE 94 05/25/2025 06:28 PM    BUN 10 05/25/2025 06:28 PM    CREATININE 0.64 05/25/2025 06:28 PM      Lab Results   Component Value Date/Time    WBC 8.7 05/25/2025 06:28 PM    RBC 4.40 05/25/2025 06:28 PM    HEMOGLOBIN 13.0 05/25/2025 06:28 PM    HEMATOCRIT 39.3 05/25/2025 06:28 PM    MCV 89.3 05/25/2025 06:28 PM    MCH 29.5 05/25/2025 06:28 PM    MCHC 33.1 05/25/2025 06:28 PM    MPV 9.7 05/25/2025 06:28 PM    NEUTSPOLYS 53.90 05/25/2025 06:28 PM    LYMPHOCYTES 33.90 05/25/2025 06:28 PM    MONOCYTES 9.20 05/25/2025 06:28 PM    EOSINOPHILS 1.70 05/25/2025 06:28 PM    BASOPHILS 1.00 05/25/2025 06:28 PM      Lab Results   Component Value Date/Time    CHOLSTRLTOT 120 11/26/2024 12:57 AM    LDL 63 11/26/2024 12:57 AM    HDL 41 11/26/2024 12:57 AM    TRIGLYCERIDE 79 11/26/2024 12:57 AM       Lab Results   Component Value Date/Time    TROPONINT 24 (H) 05/25/2025 1828     Lab Results   Component Value Date/Time    NTPROBNP 787 (H) 05/25/2025 1828       Coronary Angiogram 11/25/2025  Hemodynamics:   Aorta: 128/70 mmHg  LVEDP: 15 mmHg     Procedure performed  Left heart catheterization  Percutaneous coronary intervention - Stent Placement (LAD, diagonal, Cx)  Intravascular Ultrasound (LAD, Diagonal, Cx)    Results of intervention to the circumflex:  Pre: 90% stenosis and CARMELLA III flow  Post: 0% residual stenosis and CARMELLA III flow.     Results of intervention to the diagonal 1:  Pre: 90% stenosis and CARMELLA III flow  Post: 0% residual stenosis and CARMELLA III flow.      Results of intervention to the LAD:  Pre: 80% stenosis and CARMELLA III  flow  Post: 0% residual stenosis and CARMELLA III flow.      Assessment:   1. Coronary artery disease due to calcified coronary lesion    2. Presence of drug coated stent in coronary artery  - Lipoprotein (a); Future  - Lipid Profile; Future    3. Dyslipidemia  - Lipoprotein (a); Future  - Lipid Profile; Future    4. Mild aortic regurgitation  - EC-ECHOCARDIOGRAM COMPLETE W/O CONT; Future    5. Left ventricular hypertrophy  - EC-ECHOCARDIOGRAM COMPLETE W/O CONT; Future  - Comp Metabolic Panel; Future    6. Mild aortic stenosis  - EC-ECHOCARDIOGRAM COMPLETE W/O CONT; Future    7. Atherosclerosis of abdominal aorta (HCC)    8. Paroxysmal supraventricular tachycardia (HCC)    9. PAT (paroxysmal atrial tachycardia) (HCC)        Medical Decision Making:  Today's Assessment / Plan:   SVT   PAT  -patient had missed 4 days of doses of multaq, has since been compliant and taking when she eats meals or a snack. No reoccurrence of SVT or palpitations  -continue multaq 400 mg twice a day   -continue diltiazem 120 mg daily    Mild aortic regurgitation  Mild aortic stenosis  -repeat echocardiogram ordered for surveillance    Hypertension  - Good control  - continue diltiazem 120 mg daily  - goal < 130/80    CAD s/p PCI AMISH 11/25/2024  -stable, denies angina  -completed cardiac rehab  -continue plavix and asa 81 mg daily for 12 months and then transition to monotherapy plavix lifelong  -continue atorvastatin 80 mg every evening    Hyperlipidemia  -Most recent LDL 63  -Continue atorvastatin 80 mg every evening  -Goal of less than 70  -Check lipid panel in 3 months    Preop cardiovascular exam  Patient is low to moderate risk of cardiovascular complications for low to moderate risk surgery or procedures.  Vaishali is medically optimized based on my last in person assessment, and if  symptoms have not changed, surgery should proceed without further cardiac work-up.   -patient will need to hold plavix and asa for 7 days prior to surgery and  "resume following day pending bleeding risk or per surgeon recommendations    Return in about 4 months (around 11/2/2025) for Kezia GODOY.  Sooner if problems.    MATILDE Grover          [1]   Past Medical History:  Diagnosis Date    Anesthesia     \"couldn't open eye or talk\"    Arrhythmia     PSVT    CAD (coronary artery disease) 11/10/2010    Cardiac arrhythmia     Chickenpox     Complex tear of medial meniscus of left knee as current injury 07/06/2017    Crohns disease     Diarrhea     Dizziness 02/02/2010    GERD (gastroesophageal reflux disease)     Heart burn     Heartburn     High cholesterol     Hypercholesteremia 11/10/2010    Indigestion     Influenza     Mumps     Murmur 11/10/2010    Osteoporosis     Palpitations 11/10/2010    Paroxysmal supraventricular tachycardia (HCC) 11/10/2010    Pneumonia     Shortness of breath     Tonsillitis     Unspecified cataract     IOL OD   [2]   Social History  Tobacco Use    Smoking status: Never    Smokeless tobacco: Never   Vaping Use    Vaping status: Never Used   Substance Use Topics    Alcohol use: Yes     Alcohol/week: 0.6 oz     Types: 1 Glasses of wine per week     Comment: 1 weekly    Drug use: Never   [3] No Known Allergies    "

## 2025-07-16 ENCOUNTER — TELEPHONE (OUTPATIENT)
Dept: HEALTH INFORMATION MANAGEMENT | Facility: OTHER | Age: 80
End: 2025-07-16
Payer: MEDICARE

## (undated) DEVICE — GLOVE BIOGEL PI INDICATOR SZ 6.5 SURGICAL PF LF - (50/BX 4BX/CA)

## (undated) DEVICE — TUBING DAY USE W/CARTRIDGE (10EA/BX)

## (undated) DEVICE — CHLORAPREP 26 ML APPLICATOR - ORANGE TINT(25/CA)

## (undated) DEVICE — ELECTRODE DUAL RETURN W/ CORD - (50/PK)

## (undated) DEVICE — LACTATED RINGERS INJ 1000 ML - (14EA/CA 60CA/PF)

## (undated) DEVICE — GOWN WARMING STANDARD FLEX - (30/CA)

## (undated) DEVICE — SLEEVE, SEQUENTIAL CALF REG

## (undated) DEVICE — NEPTUNE 4 PORT MANIFOLD - (20/PK)

## (undated) DEVICE — SUTURE 3-0 ETHILON FS-1 - (36/BX) 30 INCH

## (undated) DEVICE — GLOVE BIOGEL SZ 6.5 SURGICAL PF LTX (50PR/BX 4BX/CA)

## (undated) DEVICE — TUBING CASSETTE CROSSFLOW INTEGRATED (10EA/CA)

## (undated) DEVICE — TOURNIQUET CUFF 34 X 4 ONE PORT DISP - STERILE (10/BX)

## (undated) DEVICE — SUTURE GENERAL

## (undated) DEVICE — HUMID-VENT HEAT AND MOISTURE EXCHANGE- (50/BX)

## (undated) DEVICE — MASK AIRWAY SZ 2.5 UNIQUE SILICON (10EA/BX)

## (undated) DEVICE — SHAVER4.0 AGGRESSIVE + FORMLA (5EA/BX)

## (undated) DEVICE — DRAPE LARGE 3 QUARTER - (20/CA)

## (undated) DEVICE — GLOVE BIOGEL PI ORTHO SZ 8 PF LF (40PR/BX)

## (undated) DEVICE — GLOVE, LITE (PAIR)

## (undated) DEVICE — GLOVE BIOGEL PI ULTRATOUCH SZ 7.0 SURGICAL PF LF- POWDER FREE (50/BX 4BX/CA)

## (undated) DEVICE — HEAD HOLDER JUNIOR/ADULT

## (undated) DEVICE — ELECTRODE 850 FOAM ADHESIVE - HYDROGEL RADIOTRNSPRNT (50/PK)

## (undated) DEVICE — SUCTION INSTRUMENT YANKAUER BULBOUS TIP W/O VENT (50EA/CA)

## (undated) DEVICE — KIT ROOM DECONTAMINATION

## (undated) DEVICE — GLOVE BIOGEL SZ 8 SURGICAL PF LTX - (50PR/BX 4BX/CA)

## (undated) DEVICE — PACK KNEE ARTHROSCOPY SM OR - (2EA/CA)

## (undated) DEVICE — MASK ANESTHESIA ADULT  - (100/CA)

## (undated) DEVICE — KIT ANESTHESIA W/CIRCUIT & 3/LT BAG W/FILTER (20EA/CA)

## (undated) DEVICE — CANISTER SUCTION RIGID RED 1500CC (40EA/CA)

## (undated) DEVICE — SODIUM CHL IRRIGATION 0.9% 1000ML (12EA/CA)

## (undated) DEVICE — TUBE CONNECTING SUCTION - CLEAR PLASTIC STERILE 72 IN (50EA/CA)

## (undated) DEVICE — PROTECTOR ULNA NERVE - (36PR/CA)

## (undated) DEVICE — SENSOR SPO2 NEO LNCS ADHESIVE (20/BX) SEE USER NOTES

## (undated) DEVICE — SODIUM CHL. IRRIGATION 0.9% 3000ML (4EA/CA 65CA/PF)